# Patient Record
Sex: FEMALE | Race: WHITE | NOT HISPANIC OR LATINO | Employment: FULL TIME | URBAN - METROPOLITAN AREA
[De-identification: names, ages, dates, MRNs, and addresses within clinical notes are randomized per-mention and may not be internally consistent; named-entity substitution may affect disease eponyms.]

---

## 2017-12-08 ENCOUNTER — APPOINTMENT (OUTPATIENT)
Dept: PREADMISSION TESTING | Facility: HOSPITAL | Age: 52
End: 2017-12-08

## 2017-12-11 ENCOUNTER — APPOINTMENT (OUTPATIENT)
Dept: PREADMISSION TESTING | Facility: HOSPITAL | Age: 52
End: 2017-12-11

## 2017-12-11 VITALS
TEMPERATURE: 98.6 F | SYSTOLIC BLOOD PRESSURE: 101 MMHG | BODY MASS INDEX: 42.68 KG/M2 | RESPIRATION RATE: 16 BRPM | HEART RATE: 76 BPM | OXYGEN SATURATION: 97 % | HEIGHT: 64 IN | DIASTOLIC BLOOD PRESSURE: 67 MMHG | WEIGHT: 250 LBS

## 2017-12-11 LAB
ANION GAP SERPL CALCULATED.3IONS-SCNC: 15.3 MMOL/L
BASOPHILS # BLD AUTO: 0.04 10*3/MM3 (ref 0–0.2)
BASOPHILS NFR BLD AUTO: 0.9 % (ref 0–1.5)
BILIRUB UR QL STRIP: NEGATIVE
BUN BLD-MCNC: 11 MG/DL (ref 6–20)
BUN/CREAT SERPL: 17.7 (ref 7–25)
CALCIUM SPEC-SCNC: 9.2 MG/DL (ref 8.6–10.5)
CHLORIDE SERPL-SCNC: 102 MMOL/L (ref 98–107)
CLARITY UR: CLEAR
CO2 SERPL-SCNC: 24.7 MMOL/L (ref 22–29)
COLOR UR: YELLOW
CREAT BLD-MCNC: 0.62 MG/DL (ref 0.57–1)
DEPRECATED RDW RBC AUTO: 45.6 FL (ref 37–54)
EOSINOPHIL # BLD AUTO: 0.11 10*3/MM3 (ref 0–0.7)
EOSINOPHIL NFR BLD AUTO: 2.4 % (ref 0.3–6.2)
ERYTHROCYTE [DISTWIDTH] IN BLOOD BY AUTOMATED COUNT: 13.4 % (ref 11.7–13)
GFR SERPL CREATININE-BSD FRML MDRD: 101 ML/MIN/1.73
GLUCOSE BLD-MCNC: 100 MG/DL (ref 65–99)
GLUCOSE UR STRIP-MCNC: NEGATIVE MG/DL
HCG SERPL QL: NEGATIVE
HCT VFR BLD AUTO: 40 % (ref 35.6–45.5)
HGB BLD-MCNC: 12.9 G/DL (ref 11.9–15.5)
HGB UR QL STRIP.AUTO: NEGATIVE
IMM GRANULOCYTES # BLD: 0 10*3/MM3 (ref 0–0.03)
IMM GRANULOCYTES NFR BLD: 0 % (ref 0–0.5)
KETONES UR QL STRIP: ABNORMAL
LEUKOCYTE ESTERASE UR QL STRIP.AUTO: NEGATIVE
LYMPHOCYTES # BLD AUTO: 1.2 10*3/MM3 (ref 0.9–4.8)
LYMPHOCYTES NFR BLD AUTO: 26.7 % (ref 19.6–45.3)
MCH RBC QN AUTO: 30 PG (ref 26.9–32)
MCHC RBC AUTO-ENTMCNC: 32.3 G/DL (ref 32.4–36.3)
MCV RBC AUTO: 93 FL (ref 80.5–98.2)
MONOCYTES # BLD AUTO: 0.33 10*3/MM3 (ref 0.2–1.2)
MONOCYTES NFR BLD AUTO: 7.3 % (ref 5–12)
NEUTROPHILS # BLD AUTO: 2.81 10*3/MM3 (ref 1.9–8.1)
NEUTROPHILS NFR BLD AUTO: 62.7 % (ref 42.7–76)
NITRITE UR QL STRIP: NEGATIVE
PH UR STRIP.AUTO: 8.5 [PH] (ref 5–8)
PLATELET # BLD AUTO: 221 10*3/MM3 (ref 140–500)
PMV BLD AUTO: 11.9 FL (ref 6–12)
POTASSIUM BLD-SCNC: 3.5 MMOL/L (ref 3.5–5.2)
PROT UR QL STRIP: ABNORMAL
RBC # BLD AUTO: 4.3 10*6/MM3 (ref 3.9–5.2)
SODIUM BLD-SCNC: 142 MMOL/L (ref 136–145)
SP GR UR STRIP: 1.02 (ref 1–1.03)
UROBILINOGEN UR QL STRIP: ABNORMAL
WBC NRBC COR # BLD: 4.49 10*3/MM3 (ref 4.5–10.7)

## 2017-12-11 PROCEDURE — 93010 ELECTROCARDIOGRAM REPORT: CPT | Performed by: INTERNAL MEDICINE

## 2017-12-11 PROCEDURE — 81003 URINALYSIS AUTO W/O SCOPE: CPT | Performed by: ORTHOPAEDIC SURGERY

## 2017-12-11 PROCEDURE — 85025 COMPLETE CBC W/AUTO DIFF WBC: CPT | Performed by: ORTHOPAEDIC SURGERY

## 2017-12-11 PROCEDURE — 93005 ELECTROCARDIOGRAM TRACING: CPT | Performed by: ORTHOPAEDIC SURGERY

## 2017-12-11 PROCEDURE — 84703 CHORIONIC GONADOTROPIN ASSAY: CPT | Performed by: ORTHOPAEDIC SURGERY

## 2017-12-11 PROCEDURE — 36415 COLL VENOUS BLD VENIPUNCTURE: CPT

## 2017-12-11 PROCEDURE — 80048 BASIC METABOLIC PNL TOTAL CA: CPT | Performed by: ORTHOPAEDIC SURGERY

## 2017-12-11 RX ORDER — VITAMIN E 268 MG
400 CAPSULE ORAL EVERY MORNING
COMMUNITY
End: 2020-10-14

## 2017-12-11 RX ORDER — CELECOXIB 200 MG/1
200 CAPSULE ORAL DAILY
COMMUNITY
End: 2017-12-16 | Stop reason: HOSPADM

## 2017-12-11 RX ORDER — AMLODIPINE AND VALSARTAN 10; 320 MG/1; MG/1
1 TABLET ORAL EVERY MORNING
COMMUNITY
End: 2017-12-16 | Stop reason: HOSPADM

## 2017-12-11 RX ORDER — HYDROCHLOROTHIAZIDE 12.5 MG/1
12.5 TABLET ORAL DAILY
COMMUNITY
End: 2017-12-16 | Stop reason: HOSPADM

## 2017-12-11 RX ORDER — MAGNESIUM 200 MG
1 TABLET ORAL EVERY MORNING
COMMUNITY
End: 2020-10-14

## 2017-12-11 RX ORDER — ASPIRIN 81 MG/1
81 TABLET ORAL EVERY MORNING
COMMUNITY
End: 2017-12-16 | Stop reason: HOSPADM

## 2017-12-11 RX ORDER — FLUOXETINE HYDROCHLORIDE 20 MG/1
20 CAPSULE ORAL EVERY MORNING
COMMUNITY

## 2017-12-11 RX ORDER — ESOMEPRAZOLE MAGNESIUM 40 MG/1
40 CAPSULE, DELAYED RELEASE ORAL
COMMUNITY

## 2017-12-11 RX ORDER — OXYCODONE AND ACETAMINOPHEN 10; 325 MG/1; MG/1
1 TABLET ORAL EVERY 6 HOURS PRN
COMMUNITY
End: 2017-12-16 | Stop reason: HOSPADM

## 2017-12-11 RX ORDER — FLUTICASONE PROPIONATE 50 MCG
2 SPRAY, SUSPENSION (ML) NASAL EVERY MORNING
COMMUNITY

## 2017-12-11 RX ORDER — DULOXETIN HYDROCHLORIDE 60 MG/1
120 CAPSULE, DELAYED RELEASE ORAL EVERY MORNING
COMMUNITY

## 2017-12-11 ASSESSMENT — KOOS JR
KOOS JR SCORE: 34.174
KOOS JR SCORE: 21

## 2017-12-11 NOTE — DISCHARGE INSTRUCTIONS
Take the following medications the morning of surgery with a small sip of water:    AMLODIPINE  AND NEXIUM    General Instructions:  • Do not eat solid food after midnight the night before surgery.  • You may drink clear liquids day of surgery but must stop at least one hour before your hospital arrival time.   ( 1030 AM )  • It is beneficial for you to have a clear drink that contains carbohydrates the day of surgery.  We suggest a 12 to 20 ounce bottle of Gatorade or Powerade for non-diabetic patients     Clear liquids are liquids you can see through.  Nothing red in color.     Plain water                               Sports drinks  Sodas                                   Gelatin (Jell-O)  Fruit juices without pulp such as white grape juice and apple juice  Popsicles that contain no fruit or yogurt  Tea or coffee (no cream or milk added)  Gatorade / Powerade  G2 / Powerade Zero    • Patients who avoid smoking, chewing tobacco and alcohol for 4 weeks prior to surgery have a reduced risk of post-operative complications.  Quit smoking as many days before surgery as you can.  • Do not smoke, use chewing tobacco or drink alcohol the day of surgery.   • If applicable bring your C-PAP/ BI-PAP machine.  • Bring any papers given to you in the doctor’s office.  • Wear clean comfortable clothes and socks.  • Do not wear contact lenses or make-up.  Bring a case for your glasses.   • Remove all piercings.  Leave jewelry and any other valuables at home.  • The Pre-Admission Testing nurse will instruct you to bring medications if unable to obtain an accurate list in Pre-Admission Testing.    • REPORT TO MAIN SURGERY ON 12- AT 1130 AM        Preventing a Surgical Site Infection:  • For 2 to 3 days before surgery, avoid shaving with a razor because the razor can irritate skin and make it easier to develop an infection.  • The night prior to surgery sleep in a clean bed with clean clothing.  Do not allow pets to sleep with  you.  • Shower on the morning of surgery using a fresh bar of anti-bacterial soap (such as Dial) and clean washcloth.  Dry with a clean towel and dress in clean clothing.  • Ask your surgeon if you will be receiving antibiotics prior to surgery.  • Make sure you, your family, and all healthcare providers clean their hands with soap and water or an alcohol based hand  before caring for you or your wound.    Day of surgery:  Upon arrival, a Pre-op nurse and Anesthesiologist will review your health history, obtain vital signs, and answer questions you may have.  The only belongings needed at this time will be your home medications and if applicable your C-PAP/BI-PAP machine.  If you are staying overnight your family can leave the rest of your belongings in the car and bring them to your room later.  A Pre-op nurse will start an IV and you may receive medication in preparation for surgery, including something to help you relax.  Your family will be able to see you in the Pre-op area.  While you are in surgery your family should notify the waiting room  if they leave the waiting room area and provide a contact phone number.    Please be aware that surgery does come with discomfort.  We want to make every effort to control your discomfort so please discuss any uncontrolled symptoms with your nurse.   Your doctor will most likely have prescribed pain medications.      If you are staying overnight following surgery, you will be transported to your hospital room following the recovery period.  [facility] has all private rooms.    If you have any questions please call Pre-Admission Testing at [phone].    Deductibles and co-payments are collected on the day of service. Please be prepared to pay the required co-pay, deductible or deposit on the day of service as defined by your plan.

## 2017-12-12 ENCOUNTER — APPOINTMENT (OUTPATIENT)
Dept: GENERAL RADIOLOGY | Facility: HOSPITAL | Age: 52
End: 2017-12-12
Attending: ORTHOPAEDIC SURGERY

## 2017-12-12 ENCOUNTER — ANESTHESIA (OUTPATIENT)
Dept: PERIOP | Facility: HOSPITAL | Age: 52
End: 2017-12-12

## 2017-12-12 ENCOUNTER — HOSPITAL ENCOUNTER (INPATIENT)
Facility: HOSPITAL | Age: 52
LOS: 4 days | Discharge: HOME HEALTH | End: 2017-12-16
Attending: ORTHOPAEDIC SURGERY | Admitting: ORTHOPAEDIC SURGERY

## 2017-12-12 ENCOUNTER — ANESTHESIA EVENT (OUTPATIENT)
Dept: PERIOP | Facility: HOSPITAL | Age: 52
End: 2017-12-12

## 2017-12-12 DIAGNOSIS — R26.89 IMPAIRED GAIT AND MOBILITY: ICD-10-CM

## 2017-12-12 DIAGNOSIS — T84.84XA PAIN DUE TO INTERNAL ORTHOPEDIC PROSTHETIC DEVICES, IMPLANTS AND GRAFTS, INITIAL ENCOUNTER (HCC): Primary | ICD-10-CM

## 2017-12-12 PROCEDURE — 0SRD0J9 REPLACEMENT OF LEFT KNEE JOINT WITH SYNTHETIC SUBSTITUTE, CEMENTED, OPEN APPROACH: ICD-10-PCS | Performed by: ORTHOPAEDIC SURGERY

## 2017-12-12 PROCEDURE — 25010000002 FENTANYL CITRATE (PF) 100 MCG/2ML SOLUTION: Performed by: NURSE ANESTHETIST, CERTIFIED REGISTERED

## 2017-12-12 PROCEDURE — 73560 X-RAY EXAM OF KNEE 1 OR 2: CPT

## 2017-12-12 PROCEDURE — C1713 ANCHOR/SCREW BN/BN,TIS/BN: HCPCS | Performed by: ORTHOPAEDIC SURGERY

## 2017-12-12 PROCEDURE — L1830 KO IMMOB CANVAS LONG PRE OTS: HCPCS | Performed by: ORTHOPAEDIC SURGERY

## 2017-12-12 PROCEDURE — C1776 JOINT DEVICE (IMPLANTABLE): HCPCS | Performed by: ORTHOPAEDIC SURGERY

## 2017-12-12 PROCEDURE — 25010000002 PROPOFOL 10 MG/ML EMULSION: Performed by: NURSE ANESTHETIST, CERTIFIED REGISTERED

## 2017-12-12 PROCEDURE — 25010000003 CEFAZOLIN IN DEXTROSE 2-4 GM/100ML-% SOLUTION: Performed by: ORTHOPAEDIC SURGERY

## 2017-12-12 PROCEDURE — 25010000002 FENTANYL CITRATE (PF) 100 MCG/2ML SOLUTION: Performed by: ANESTHESIOLOGY

## 2017-12-12 PROCEDURE — 25010000002 HYDROMORPHONE PER 4 MG: Performed by: NURSE ANESTHETIST, CERTIFIED REGISTERED

## 2017-12-12 PROCEDURE — 25010000002 MIDAZOLAM PER 1 MG: Performed by: ANESTHESIOLOGY

## 2017-12-12 PROCEDURE — 25010000002 KETOROLAC TROMETHAMINE PER 15 MG

## 2017-12-12 PROCEDURE — 0SPD0JZ REMOVAL OF SYNTHETIC SUBSTITUTE FROM LEFT KNEE JOINT, OPEN APPROACH: ICD-10-PCS | Performed by: ORTHOPAEDIC SURGERY

## 2017-12-12 PROCEDURE — 25010000002 ONDANSETRON PER 1 MG: Performed by: ANESTHESIOLOGY

## 2017-12-12 DEVICE — IMPLANTABLE DEVICE: Type: IMPLANTABLE DEVICE | Site: KNEE | Status: FUNCTIONAL

## 2017-12-12 DEVICE — STEM TIB PRI FINN 46X40MM: Type: IMPLANTABLE DEVICE | Site: KNEE | Status: FUNCTIONAL

## 2017-12-12 DEVICE — PAT 3PEG STD 8X31MM: Type: IMPLANTABLE DEVICE | Site: KNEE | Status: FUNCTIONAL

## 2017-12-12 DEVICE — BEAR TIB/KN VANGUARD CR DCM 16X71/75MM NS: Type: IMPLANTABLE DEVICE | Site: KNEE | Status: FUNCTIONAL

## 2017-12-12 RX ORDER — TRANEXAMIC ACID 100 MG/ML
INJECTION, SOLUTION INTRAVENOUS AS NEEDED
Status: DISCONTINUED | OUTPATIENT
Start: 2017-12-12 | End: 2017-12-12 | Stop reason: SURG

## 2017-12-12 RX ORDER — DULOXETIN HYDROCHLORIDE 60 MG/1
120 CAPSULE, DELAYED RELEASE ORAL DAILY
Status: DISCONTINUED | OUTPATIENT
Start: 2017-12-12 | End: 2017-12-16 | Stop reason: HOSPADM

## 2017-12-12 RX ORDER — PROPOFOL 10 MG/ML
VIAL (ML) INTRAVENOUS AS NEEDED
Status: DISCONTINUED | OUTPATIENT
Start: 2017-12-12 | End: 2017-12-12 | Stop reason: SURG

## 2017-12-12 RX ORDER — HYDROMORPHONE HYDROCHLORIDE 1 MG/ML
0.5 INJECTION, SOLUTION INTRAMUSCULAR; INTRAVENOUS; SUBCUTANEOUS
Status: DISCONTINUED | OUTPATIENT
Start: 2017-12-12 | End: 2017-12-12 | Stop reason: HOSPADM

## 2017-12-12 RX ORDER — ALBUTEROL SULFATE 2.5 MG/3ML
2.5 SOLUTION RESPIRATORY (INHALATION) ONCE AS NEEDED
Status: DISCONTINUED | OUTPATIENT
Start: 2017-12-12 | End: 2017-12-12 | Stop reason: HOSPADM

## 2017-12-12 RX ORDER — ONDANSETRON 4 MG/1
4 TABLET, ORALLY DISINTEGRATING ORAL EVERY 6 HOURS PRN
Status: DISCONTINUED | OUTPATIENT
Start: 2017-12-12 | End: 2017-12-16 | Stop reason: HOSPADM

## 2017-12-12 RX ORDER — SODIUM CHLORIDE 0.9 % (FLUSH) 0.9 %
1-10 SYRINGE (ML) INJECTION AS NEEDED
Status: DISCONTINUED | OUTPATIENT
Start: 2017-12-12 | End: 2017-12-12 | Stop reason: HOSPADM

## 2017-12-12 RX ORDER — MIDAZOLAM HYDROCHLORIDE 1 MG/ML
2 INJECTION INTRAMUSCULAR; INTRAVENOUS
Status: DISCONTINUED | OUTPATIENT
Start: 2017-12-12 | End: 2017-12-12 | Stop reason: HOSPADM

## 2017-12-12 RX ORDER — HYDRALAZINE HYDROCHLORIDE 20 MG/ML
5 INJECTION INTRAMUSCULAR; INTRAVENOUS
Status: DISCONTINUED | OUTPATIENT
Start: 2017-12-12 | End: 2017-12-12 | Stop reason: HOSPADM

## 2017-12-12 RX ORDER — KETOROLAC TROMETHAMINE 30 MG/ML
30 INJECTION, SOLUTION INTRAMUSCULAR; INTRAVENOUS EVERY 6 HOURS PRN
Status: DISPENSED | OUTPATIENT
Start: 2017-12-12 | End: 2017-12-14

## 2017-12-12 RX ORDER — SENNA AND DOCUSATE SODIUM 50; 8.6 MG/1; MG/1
2 TABLET, FILM COATED ORAL 2 TIMES DAILY
Status: DISCONTINUED | OUTPATIENT
Start: 2017-12-12 | End: 2017-12-16 | Stop reason: HOSPADM

## 2017-12-12 RX ORDER — HYDROCHLOROTHIAZIDE 25 MG/1
12.5 TABLET ORAL DAILY
Status: DISCONTINUED | OUTPATIENT
Start: 2017-12-12 | End: 2017-12-13

## 2017-12-12 RX ORDER — DIPHENHYDRAMINE HYDROCHLORIDE 50 MG/ML
12.5 INJECTION INTRAMUSCULAR; INTRAVENOUS
Status: DISCONTINUED | OUTPATIENT
Start: 2017-12-12 | End: 2017-12-12 | Stop reason: HOSPADM

## 2017-12-12 RX ORDER — FLUOXETINE HYDROCHLORIDE 20 MG/1
20 CAPSULE ORAL EVERY MORNING
Status: DISCONTINUED | OUTPATIENT
Start: 2017-12-13 | End: 2017-12-16 | Stop reason: HOSPADM

## 2017-12-12 RX ORDER — EPHEDRINE SULFATE 50 MG/ML
INJECTION, SOLUTION INTRAVENOUS AS NEEDED
Status: DISCONTINUED | OUTPATIENT
Start: 2017-12-12 | End: 2017-12-12 | Stop reason: SURG

## 2017-12-12 RX ORDER — ONDANSETRON 4 MG/1
4 TABLET, FILM COATED ORAL EVERY 6 HOURS PRN
Status: DISCONTINUED | OUTPATIENT
Start: 2017-12-12 | End: 2017-12-16 | Stop reason: HOSPADM

## 2017-12-12 RX ORDER — MIDAZOLAM HYDROCHLORIDE 1 MG/ML
1 INJECTION INTRAMUSCULAR; INTRAVENOUS
Status: DISCONTINUED | OUTPATIENT
Start: 2017-12-12 | End: 2017-12-12 | Stop reason: HOSPADM

## 2017-12-12 RX ORDER — FENTANYL CITRATE 50 UG/ML
50 INJECTION, SOLUTION INTRAMUSCULAR; INTRAVENOUS
Status: DISCONTINUED | OUTPATIENT
Start: 2017-12-12 | End: 2017-12-12 | Stop reason: HOSPADM

## 2017-12-12 RX ORDER — ONDANSETRON 2 MG/ML
INJECTION INTRAMUSCULAR; INTRAVENOUS AS NEEDED
Status: DISCONTINUED | OUTPATIENT
Start: 2017-12-12 | End: 2017-12-12 | Stop reason: SURG

## 2017-12-12 RX ORDER — ONDANSETRON 2 MG/ML
4 INJECTION INTRAMUSCULAR; INTRAVENOUS ONCE AS NEEDED
Status: DISCONTINUED | OUTPATIENT
Start: 2017-12-12 | End: 2017-12-12 | Stop reason: HOSPADM

## 2017-12-12 RX ORDER — HYDROMORPHONE HCL 110MG/55ML
PATIENT CONTROLLED ANALGESIA SYRINGE INTRAVENOUS AS NEEDED
Status: DISCONTINUED | OUTPATIENT
Start: 2017-12-12 | End: 2017-12-12 | Stop reason: SURG

## 2017-12-12 RX ORDER — ATENOLOL 50 MG/1
100 TABLET ORAL
Status: DISCONTINUED | OUTPATIENT
Start: 2017-12-12 | End: 2017-12-13

## 2017-12-12 RX ORDER — PANTOPRAZOLE SODIUM 40 MG/1
40 TABLET, DELAYED RELEASE ORAL EVERY MORNING
Status: DISCONTINUED | OUTPATIENT
Start: 2017-12-13 | End: 2017-12-16 | Stop reason: HOSPADM

## 2017-12-12 RX ORDER — NALOXONE HCL 0.4 MG/ML
0.1 VIAL (ML) INJECTION
Status: DISCONTINUED | OUTPATIENT
Start: 2017-12-12 | End: 2017-12-16 | Stop reason: HOSPADM

## 2017-12-12 RX ORDER — SODIUM CHLORIDE, SODIUM LACTATE, POTASSIUM CHLORIDE, CALCIUM CHLORIDE 600; 310; 30; 20 MG/100ML; MG/100ML; MG/100ML; MG/100ML
9 INJECTION, SOLUTION INTRAVENOUS CONTINUOUS
Status: DISCONTINUED | OUTPATIENT
Start: 2017-12-12 | End: 2017-12-12 | Stop reason: HOSPADM

## 2017-12-12 RX ORDER — FLUTICASONE PROPIONATE 50 MCG
2 SPRAY, SUSPENSION (ML) NASAL EVERY MORNING
Status: DISCONTINUED | OUTPATIENT
Start: 2017-12-13 | End: 2017-12-16 | Stop reason: HOSPADM

## 2017-12-12 RX ORDER — ONDANSETRON 2 MG/ML
4 INJECTION INTRAMUSCULAR; INTRAVENOUS EVERY 6 HOURS PRN
Status: DISCONTINUED | OUTPATIENT
Start: 2017-12-12 | End: 2017-12-16 | Stop reason: HOSPADM

## 2017-12-12 RX ORDER — GLYCOPYRROLATE 0.2 MG/ML
INJECTION INTRAMUSCULAR; INTRAVENOUS AS NEEDED
Status: DISCONTINUED | OUTPATIENT
Start: 2017-12-12 | End: 2017-12-12 | Stop reason: SURG

## 2017-12-12 RX ORDER — BUPIVACAINE HYDROCHLORIDE AND EPINEPHRINE 5; 5 MG/ML; UG/ML
INJECTION, SOLUTION EPIDURAL; INTRACAUDAL; PERINEURAL AS NEEDED
Status: DISCONTINUED | OUTPATIENT
Start: 2017-12-12 | End: 2017-12-12 | Stop reason: SURG

## 2017-12-12 RX ORDER — LIDOCAINE HYDROCHLORIDE 20 MG/ML
INJECTION, SOLUTION INFILTRATION; PERINEURAL AS NEEDED
Status: DISCONTINUED | OUTPATIENT
Start: 2017-12-12 | End: 2017-12-12 | Stop reason: SURG

## 2017-12-12 RX ORDER — KETOROLAC TROMETHAMINE 30 MG/ML
INJECTION, SOLUTION INTRAMUSCULAR; INTRAVENOUS
Status: COMPLETED
Start: 2017-12-12 | End: 2017-12-12

## 2017-12-12 RX ORDER — VITAMIN E 268 MG
400 CAPSULE ORAL EVERY MORNING
Status: DISCONTINUED | OUTPATIENT
Start: 2017-12-13 | End: 2017-12-16 | Stop reason: HOSPADM

## 2017-12-12 RX ORDER — LABETALOL HYDROCHLORIDE 5 MG/ML
5 INJECTION, SOLUTION INTRAVENOUS
Status: DISCONTINUED | OUTPATIENT
Start: 2017-12-12 | End: 2017-12-12 | Stop reason: HOSPADM

## 2017-12-12 RX ORDER — CEFAZOLIN SODIUM 2 G/100ML
2 INJECTION, SOLUTION INTRAVENOUS ONCE
Status: COMPLETED | OUTPATIENT
Start: 2017-12-12 | End: 2017-12-12

## 2017-12-12 RX ORDER — OXYCODONE AND ACETAMINOPHEN 10; 325 MG/1; MG/1
1 TABLET ORAL EVERY 4 HOURS PRN
Status: DISCONTINUED | OUTPATIENT
Start: 2017-12-12 | End: 2017-12-16 | Stop reason: HOSPADM

## 2017-12-12 RX ORDER — MAGNESIUM HYDROXIDE 1200 MG/15ML
LIQUID ORAL AS NEEDED
Status: DISCONTINUED | OUTPATIENT
Start: 2017-12-12 | End: 2017-12-12 | Stop reason: HOSPADM

## 2017-12-12 RX ORDER — CHLORTHALIDONE 25 MG/1
25 TABLET ORAL DAILY
Status: DISCONTINUED | OUTPATIENT
Start: 2017-12-12 | End: 2017-12-13

## 2017-12-12 RX ORDER — ATENOLOL AND CHLORTHALIDONE TABLET 100; 25 MG/1; MG/1
1 TABLET ORAL
Status: DISCONTINUED | OUTPATIENT
Start: 2017-12-12 | End: 2017-12-12 | Stop reason: CLARIF

## 2017-12-12 RX ORDER — BUPIVACAINE HYDROCHLORIDE AND EPINEPHRINE 5; 5 MG/ML; UG/ML
INJECTION, SOLUTION PERINEURAL AS NEEDED
Status: DISCONTINUED | OUTPATIENT
Start: 2017-12-12 | End: 2017-12-12 | Stop reason: HOSPADM

## 2017-12-12 RX ORDER — OXYCODONE AND ACETAMINOPHEN 10; 325 MG/1; MG/1
TABLET ORAL
Status: COMPLETED
Start: 2017-12-12 | End: 2017-12-12

## 2017-12-12 RX ORDER — FAMOTIDINE 10 MG/ML
20 INJECTION, SOLUTION INTRAVENOUS ONCE
Status: COMPLETED | OUTPATIENT
Start: 2017-12-12 | End: 2017-12-12

## 2017-12-12 RX ORDER — CEFAZOLIN SODIUM 2 G/100ML
2 INJECTION, SOLUTION INTRAVENOUS EVERY 8 HOURS
Status: COMPLETED | OUTPATIENT
Start: 2017-12-12 | End: 2017-12-13

## 2017-12-12 RX ADMIN — FENTANYL CITRATE 50 MCG: 50 INJECTION INTRAMUSCULAR; INTRAVENOUS at 14:29

## 2017-12-12 RX ADMIN — MEPERIDINE HYDROCHLORIDE 12.5 MG: 25 INJECTION, SOLUTION INTRAMUSCULAR; INTRAVENOUS; SUBCUTANEOUS at 17:15

## 2017-12-12 RX ADMIN — PROPOFOL 20 MG: 10 INJECTION, EMULSION INTRAVENOUS at 14:06

## 2017-12-12 RX ADMIN — PROPOFOL 30 MG: 10 INJECTION, EMULSION INTRAVENOUS at 14:03

## 2017-12-12 RX ADMIN — DULOXETINE HYDROCHLORIDE 120 MG: 60 CAPSULE, DELAYED RELEASE ORAL at 20:57

## 2017-12-12 RX ADMIN — MUPIROCIN 1 APPLICATION: 20 OINTMENT TOPICAL at 13:45

## 2017-12-12 RX ADMIN — PROPOFOL 300 MG: 10 INJECTION, EMULSION INTRAVENOUS at 13:58

## 2017-12-12 RX ADMIN — KETOROLAC TROMETHAMINE 30 MG: 30 INJECTION, SOLUTION INTRAMUSCULAR at 17:25

## 2017-12-12 RX ADMIN — HYDROMORPHONE HYDROCHLORIDE 0.5 MG: 1 INJECTION, SOLUTION INTRAMUSCULAR; INTRAVENOUS; SUBCUTANEOUS at 17:42

## 2017-12-12 RX ADMIN — CEFAZOLIN SODIUM 2 G: 2 INJECTION, SOLUTION INTRAVENOUS at 13:58

## 2017-12-12 RX ADMIN — PROPOFOL 50 MG: 10 INJECTION, EMULSION INTRAVENOUS at 13:59

## 2017-12-12 RX ADMIN — FENTANYL CITRATE 50 MCG: 50 INJECTION INTRAMUSCULAR; INTRAVENOUS at 12:47

## 2017-12-12 RX ADMIN — BUPIVACAINE HYDROCHLORIDE AND EPINEPHRINE BITARTRATE 25 ML: 5; .0091 INJECTION, SOLUTION EPIDURAL; INTRACAUDAL; PERINEURAL at 12:34

## 2017-12-12 RX ADMIN — FAMOTIDINE 20 MG: 10 INJECTION, SOLUTION INTRAVENOUS at 12:20

## 2017-12-12 RX ADMIN — FENTANYL CITRATE 50 MCG: 50 INJECTION INTRAMUSCULAR; INTRAVENOUS at 14:07

## 2017-12-12 RX ADMIN — OXYCODONE HYDROCHLORIDE AND ACETAMINOPHEN 1 TABLET: 10; 325 TABLET ORAL at 17:50

## 2017-12-12 RX ADMIN — FENTANYL CITRATE 50 MCG: 50 INJECTION INTRAMUSCULAR; INTRAVENOUS at 13:56

## 2017-12-12 RX ADMIN — EPHEDRINE SULFATE 5 MG: 50 INJECTION INTRAMUSCULAR; INTRAVENOUS; SUBCUTANEOUS at 14:44

## 2017-12-12 RX ADMIN — SODIUM CHLORIDE, POTASSIUM CHLORIDE, SODIUM LACTATE AND CALCIUM CHLORIDE: 600; 310; 30; 20 INJECTION, SOLUTION INTRAVENOUS at 14:55

## 2017-12-12 RX ADMIN — FENTANYL CITRATE 50 MCG: 50 INJECTION INTRAMUSCULAR; INTRAVENOUS at 17:26

## 2017-12-12 RX ADMIN — CEFAZOLIN SODIUM 2 G: 2 INJECTION, SOLUTION INTRAVENOUS at 21:08

## 2017-12-12 RX ADMIN — DOCUSATE SODIUM -SENNOSIDES 2 TABLET: 50; 8.6 TABLET, COATED ORAL at 20:57

## 2017-12-12 RX ADMIN — FENTANYL CITRATE 50 MCG: 50 INJECTION INTRAMUSCULAR; INTRAVENOUS at 12:39

## 2017-12-12 RX ADMIN — HYDROMORPHONE HYDROCHLORIDE 0.5 MG: 1 INJECTION, SOLUTION INTRAMUSCULAR; INTRAVENOUS; SUBCUTANEOUS at 17:12

## 2017-12-12 RX ADMIN — LIDOCAINE HYDROCHLORIDE 40 MG: 20 INJECTION, SOLUTION INFILTRATION; PERINEURAL at 13:58

## 2017-12-12 RX ADMIN — HYDROCHLOROTHIAZIDE 12.5 MG: 25 TABLET ORAL at 20:56

## 2017-12-12 RX ADMIN — Medication 2 MG: at 12:39

## 2017-12-12 RX ADMIN — FENTANYL CITRATE 50 MCG: 50 INJECTION INTRAMUSCULAR; INTRAVENOUS at 17:10

## 2017-12-12 RX ADMIN — HYDROMORPHONE HYDROCHLORIDE 0.5 MG: 2 INJECTION INTRAMUSCULAR; INTRAVENOUS; SUBCUTANEOUS at 14:54

## 2017-12-12 RX ADMIN — TRANEXAMIC ACID 1000 MG: 100 INJECTION, SOLUTION INTRAVENOUS at 16:20

## 2017-12-12 RX ADMIN — EPHEDRINE SULFATE 10 MG: 50 INJECTION INTRAMUSCULAR; INTRAVENOUS; SUBCUTANEOUS at 14:39

## 2017-12-12 RX ADMIN — SODIUM CHLORIDE, POTASSIUM CHLORIDE, SODIUM LACTATE AND CALCIUM CHLORIDE 9 ML/HR: 600; 310; 30; 20 INJECTION, SOLUTION INTRAVENOUS at 12:22

## 2017-12-12 RX ADMIN — HYDROMORPHONE HYDROCHLORIDE 1 MG: 2 INJECTION INTRAMUSCULAR; INTRAVENOUS; SUBCUTANEOUS at 16:10

## 2017-12-12 RX ADMIN — FENTANYL CITRATE 50 MCG: 50 INJECTION INTRAMUSCULAR; INTRAVENOUS at 14:24

## 2017-12-12 RX ADMIN — HYDROMORPHONE HYDROCHLORIDE 0.5 MG: 2 INJECTION INTRAMUSCULAR; INTRAVENOUS; SUBCUTANEOUS at 14:50

## 2017-12-12 RX ADMIN — HYDROMORPHONE HYDROCHLORIDE 0.5 MG: 1 INJECTION, SOLUTION INTRAMUSCULAR; INTRAVENOUS; SUBCUTANEOUS at 17:58

## 2017-12-12 RX ADMIN — Medication 2 MG: at 12:21

## 2017-12-12 RX ADMIN — GLYCOPYRROLATE 0.2 MG: 0.2 INJECTION INTRAMUSCULAR; INTRAVENOUS at 14:19

## 2017-12-12 RX ADMIN — FENTANYL CITRATE 50 MCG: 50 INJECTION INTRAMUSCULAR; INTRAVENOUS at 13:58

## 2017-12-12 RX ADMIN — ONDANSETRON 4 MG: 2 INJECTION INTRAMUSCULAR; INTRAVENOUS at 16:25

## 2017-12-12 RX ADMIN — OXYCODONE HYDROCHLORIDE AND ACETAMINOPHEN 1 TABLET: 10; 325 TABLET ORAL at 23:24

## 2017-12-12 RX ADMIN — HYDROMORPHONE HYDROCHLORIDE 1 MG: 2 INJECTION INTRAMUSCULAR; INTRAVENOUS; SUBCUTANEOUS at 15:06

## 2017-12-12 RX ADMIN — EPHEDRINE SULFATE 10 MG: 50 INJECTION INTRAMUSCULAR; INTRAVENOUS; SUBCUTANEOUS at 14:19

## 2017-12-12 NOTE — ANESTHESIA POSTPROCEDURE EVALUATION
Patient: Sophie Ace    Procedure Summary     Date Anesthesia Start Anesthesia Stop Room / Location    12/12/17 1354 1705  [facility] /  [facility]       Procedure Diagnosis Surgeon Provider    REVISION LT TOTAL KNEE  (Left Knee) No diagnosis on file. MD Kvng Blankenship MD          Anesthesia Type: general, regional  Last vitals  BP   116/57 (12/12/17 1715)   Temp   36.7 °C (98 °F) (12/12/17 1701)   Pulse   101 (12/12/17 1715)   Resp   18 (12/12/17 1715)     SpO2   100 % (12/12/17 1715)     Post Anesthesia Care and Evaluation    Patient location during evaluation: PACU  Patient participation: complete - patient participated  Level of consciousness: awake and alert  Pain management: adequate  Airway patency: patent  Anesthetic complications: No anesthetic complications    Cardiovascular status: acceptable  Respiratory status: acceptable  Hydration status: acceptable    Comments: --------------------            12/12/17 1715     --------------------   BP:       116/57     Pulse:     101       Resp:       18       Temp:                SpO2:      100%     --------------------

## 2017-12-12 NOTE — ANESTHESIA PROCEDURE NOTES
Peripheral Block    Patient location during procedure: holding area  Start time: 12/12/2017 12:37 PM  Stop time: 12/12/2017 12:44 PM  Reason for block: at surgeon's request and post-op pain management  Performed by  Anesthesiologist: LIBBY BRENNAN  Preanesthetic Checklist  Completed: patient identified, site marked, surgical consent, pre-op evaluation, timeout performed, IV checked, risks and benefits discussed and monitors and equipment checked  Prep:  Pt Position: supine  Sterile barriers:cap, gloves, mask and sterile barriers  Prep: ChloraPrep  Patient monitoring: blood pressure monitoring, continuous pulse oximetry and EKG  Procedure  Sedation:yes  Performed under: MAC  Guidance:ultrasound guided  ULTRASOUND INTERPRETATION. Using ultrasound guidance a gauge needle was placed in close proximity to the femoral nerve, at which point, under ultrasound guidance anesthetic was injected in the area of the nerve and spread of the anesthesia was seen on ultrasound in close proximity thereto.  There were no abnormalities seen on ultrasound; a digital image was taken; and the patient tolerated the procedure with no complications. Images:still images obtained    Laterality:left  Block Type:adductor canal block  Injection Technique:single-shot  Needle Type:echogenic  Needle Gauge:20 G  Resistance on Injection: none  Medications  Local Injected:bupivacaine 0.5% with epinephrine Local Amount Injected:25mL  Post Assessment  Injection Assessment: negative aspiration for heme, no paresthesia on injection and incremental injection  Patient Tolerance:comfortable throughout block  Complications:yes

## 2017-12-12 NOTE — ANESTHESIA PREPROCEDURE EVALUATION
Anesthesia Evaluation     NPO Solid Status: > 8 hours  NPO Liquid Status: > 2 hours     Airway   Mallampati: II  no difficulty expected  Dental - normal exam     Pulmonary     breath sounds clear to auscultation  (+) sleep apnea,   Cardiovascular     ECG reviewed  Rhythm: regular  Rate: normal    (+) hypertension,       Neuro/Psych  GI/Hepatic/Renal/Endo    (+) morbid obesity, GERD,     Musculoskeletal     Abdominal    Substance History      OB/GYN          Other                                        Anesthesia Plan    ASA 3     general and regional     Anesthetic plan and risks discussed with patient.

## 2017-12-12 NOTE — ANESTHESIA PROCEDURE NOTES
Airway  Urgency: elective    Airway not difficult    General Information and Staff    Patient location during procedure: OR  Anesthesiologist: LIBBY BRENNAN  CRNA: JF NAVARRO    Indications and Patient Condition  Indications for airway management: airway protection    Preoxygenated: yes  Mask difficulty assessment: 1 - vent by mask    Final Airway Details  Final airway type: supraglottic airway      Successful airway: unique  Size 4    Number of attempts at approach: 1    Additional Comments  Smooth IV/mask induction and placement of LMA. Atraumatic, lips/teeth/mouth intact, as preop. +ETCO2, bilateral breath sounds and equal.

## 2017-12-12 NOTE — PLAN OF CARE
Problem: Patient Care Overview (Adult)  Goal: Plan of Care Review  Outcome: Ongoing (interventions implemented as appropriate)    12/12/17 1200   Coping/Psychosocial Response Interventions   Plan Of Care Reviewed With patient   Patient Care Overview   Progress no change       Goal: Adult Individualization and Mutuality  Outcome: Ongoing (interventions implemented as appropriate)    12/12/17 1200   Individualization   Patient Specific Preferences none       Goal: Discharge Needs Assessment  Outcome: Ongoing (interventions implemented as appropriate)    12/12/17 1200   Discharge Needs Assessment   Concerns To Be Addressed no discharge needs identified;denies needs/concerns at this time   Equipment Needed After Discharge cane, straight;walker, rolling;commode   Self-Care   Equipment Currently Used at Home none         Problem: Perioperative Period (Adult)  Goal: Signs and Symptoms of Listed Potential Problems Will be Absent or Manageable (Perioperative Period)  Outcome: Ongoing (interventions implemented as appropriate)    12/12/17 1200   Perioperative Period   Problems Assessed (Perioperative Period) pain;infection;physiologic stress response   Problems Present (Perioperative Period) pain;physiologic stress response

## 2017-12-12 NOTE — H&P
ORTHOPAEDIC HISTORY AND PHYSICAL      Patient: Sophie Ace  YOB: 1965    Date of Admission: 2017   Medical Record Number: [id number]    Attending Physician: Bhanu Baxter MD    Consulting Physician: Bhanu Baxter MD    Chief Complaint: painful left knee s/p unicompartmental replacement    History of Present Illness: 52 y.o. female presented to the office with persistent left knee pain since uni replacement. Has had poly revisions x 2; now with progressive lateral compartment arthrosis.    Patient denies any history of: DVT/PE, MRSA, COPD, EVAN, CHF, CAD, or A-Fib. Patient is not taking anticoagulants.     Past Medical History:   Diagnosis Date   • Anxiety and depression    • Arthritis    • Gastric reflux    • Hypertension    • IBS (irritable bowel syndrome)    • Limited joint range of motion     LEFT KNEE   • Limited range of motion (ROM) of shoulder     RIGHT   • Rotator cuff tear     RIGHT   • Seasonal allergies    • Sleep apnea     USES C-PAP   • Vitamin B 12 deficiency    • Vitamin D deficiency      Past Surgical History:   Procedure Laterality Date   •  SECTION  1987   1992    X3   • COLONOSCOPY  2015   • D&C HYSTEROSCOPY ENDOMETRIAL ABLATION  2014   • ESOPHAGEAL DILATATION      X 5   • HIATAL HERNIA REPAIR  2012    WITH LAP BAND   • INGUINAL HERNIA REPAIR Left    • KNEE MINI REVISION Left 2017   • LAPAROSCOPIC CHOLECYSTECTOMY     • LAPAROSCOPIC GASTRIC BANDING     • PARTIAL KNEE ARTHROPLASTY Left    • TUBAL ABDOMINAL LIGATION     • UMBILICAL HERNIA REPAIR     • VENTRAL HERNIA REPAIR       Social History     Occupational History   • Not on file.     Social History Main Topics   • Smoking status: Never Smoker   • Smokeless tobacco: Never Used   • Alcohol use No   • Drug use: Yes     Special: Oxycodone   • Sexual activity: Defer    Social History     Social History Narrative     Family History   Problem Relation Age of Onset   • Meghan  Hyperthermia Neg Hx        Allergies:   Allergies   Allergen Reactions   • Erythromycin Swelling     Throat swelling   • Morphine Itching and Irritability     ALSO ANXIETY, ITCHING, INSOMNIA   • Penicillins Swelling     Throat.STATES CAN TAKE KEFLEX   • Sulfa Antibiotics Anaphylaxis   • Tetracyclines & Related Swelling     throat       Medications:   Home Medications:  Prescriptions Prior to Admission   Medication Sig Dispense Refill Last Dose   • amLODIPine-valsartan (EXFORGE)  MG per tablet Take 1 tablet by mouth Every Morning.   12/12/2017 at 0930   • Calcium Carbonate (CALCIUM 600 PO) Take 1 tablet by mouth 2 (Two) Times a Day.   12/11/2017 at 1200   • celecoxib (CeleBREX) 200 MG capsule Take 200 mg by mouth Daily.   12/11/2017 at 1200   • Cholecalciferol (VITAMIN D PO) Take 1,000 mg by mouth Every Morning.   12/11/2017 at 1200   • Cyanocobalamin (VITAMIN B-12) 1000 MCG sublingual tablet Place 1 tablet under the tongue Every Morning.   12/11/2017 at 1200   • DULoxetine (CYMBALTA) 60 MG capsule Take 120 mg by mouth Daily. TAKES 2 60 MG TAB   12/11/2017 at 1200   • esomeprazole (nexIUM) 40 MG capsule Take 40 mg by mouth Every Morning Before Breakfast.   12/12/2017 at 0930   • FLUoxetine (PROZAC) 20 MG capsule Take 20 mg by mouth Every Morning.   12/11/2017 at 1200   • fluticasone (FLONASE) 50 MCG/ACT nasal spray 2 sprays by Each Nare route Every Morning.   12/11/2017 at 1200   • Glucosamine-Chondroit-Vit C-Mn (GLUCOSAMINE 1500 COMPLEX PO) Take 3,000 mg by mouth Every Morning.   12/11/2017 at 1200   • hydrochlorothiazide (HYDRODIURIL) 12.5 MG tablet Take 12.5 mg by mouth Daily.   12/11/2017 at 1200   • Multiple Vitamins-Minerals (MULTIVITAMIN ADULTS PO) Take 1 tablet by mouth Every Morning.   12/11/2017 at 1200n   • oxyCODONE-acetaminophen (PERCOCET)  MG per tablet Take 1 tablet by mouth Every 6 (Six) Hours As Needed for Moderate Pain .   12/11/2017 at 1800   • vitamin E 400 UNIT capsule Take 400 Units  by mouth Every Morning.   12/11/2017 at 1200   • aspirin 81 MG EC tablet Take 81 mg by mouth Every Morning.   over 2 week       Current Medications:  Scheduled Meds:  ceFAZolin 2 g Intravenous Once   mupirocin  Each Nare BID     Continuous Infusions:  lactated ringers 9 mL/hr Last Rate: 9 mL/hr (12/12/17 1222)     PRN Meds:.fentanyl  •  midazolam **OR** midazolam  •  sodium chloride    Review of Systems:   A 12 point system review was reviewed with the patient and from the chart  and is negative except for arthralgias    Physical Exam: 52 y.o. female   Vitals:    12/12/17 1242 12/12/17 1244 12/12/17 1246 12/12/17 1249   BP:   105/61    BP Location:   Left arm    Patient Position:   Lying    Pulse: 76 81 81 84   Resp: 13 13 13 13   Temp:       TempSrc:       SpO2: 100% 100% 100% 100%   Weight:       Height:            Gait: Not tested  Mental/HEENT/cardio/skin: The patient's general appearance was well-nourished, well-hydrated, no acute distress.  Orientation was alert and oriented ×3.  The patient's mood was normal.   Pulmonary exam shows normal late exchange, no labored breathing, or shortness of breath.    The skin exam showed normal temperature and color in the area of examination.  Extremities: no C,C,E left knee with healed incision, valgus, painful ROM  Pulses:  Pulses palpable and equal bilaterally    Diagnostic Tests:    Admission on 12/12/2017   Component Date Value Ref Range Status   • Glucose 12/11/2017 100* 65 - 99 mg/dL Final   • BUN 12/11/2017 11  6 - 20 mg/dL Final   • Creatinine 12/11/2017 0.62  0.57 - 1.00 mg/dL Final   • Sodium 12/11/2017 142  136 - 145 mmol/L Final   • Potassium 12/11/2017 3.5  3.5 - 5.2 mmol/L Final   • Chloride 12/11/2017 102  98 - 107 mmol/L Final   • CO2 12/11/2017 24.7  22.0 - 29.0 mmol/L Final   • Calcium 12/11/2017 9.2  8.6 - 10.5 mg/dL Final   • eGFR Non African Amer 12/11/2017 101  >60 mL/min/1.73 Final   • BUN/Creatinine Ratio 12/11/2017 17.7  7.0 - 25.0 Final   • Anion  Gap 12/11/2017 15.3  mmol/L Final   • Color, UA 12/11/2017 Yellow  Yellow, Straw Final   • Appearance, UA 12/11/2017 Clear  Clear Final   • pH, UA 12/11/2017 8.5* 5.0 - 8.0 Final   • Specific Gravity, UA 12/11/2017 1.018  1.005 - 1.030 Final   • Glucose, UA 12/11/2017 Negative  Negative Final   • Ketones, UA 12/11/2017 Trace* Negative Final   • Bilirubin, UA 12/11/2017 Negative  Negative Final   • Blood, UA 12/11/2017 Negative  Negative Final   • Protein, UA 12/11/2017 Trace* Negative Final   • Leuk Esterase, UA 12/11/2017 Negative  Negative Final   • Nitrite, UA 12/11/2017 Negative  Negative Final   • Urobilinogen, UA 12/11/2017 1.0 E.U./dL  0.2 - 1.0 E.U./dL Final   • WBC 12/11/2017 4.49* 4.50 - 10.70 10*3/mm3 Final   • RBC 12/11/2017 4.30  3.90 - 5.20 10*6/mm3 Final   • Hemoglobin 12/11/2017 12.9  11.9 - 15.5 g/dL Final   • Hematocrit 12/11/2017 40.0  35.6 - 45.5 % Final   • MCV 12/11/2017 93.0  80.5 - 98.2 fL Final   • MCH 12/11/2017 30.0  26.9 - 32.0 pg Final   • MCHC 12/11/2017 32.3* 32.4 - 36.3 g/dL Final   • RDW 12/11/2017 13.4* 11.7 - 13.0 % Final   • RDW-SD 12/11/2017 45.6  37.0 - 54.0 fl Final   • MPV 12/11/2017 11.9  6.0 - 12.0 fL Final   • Platelets 12/11/2017 221  140 - 500 10*3/mm3 Final   • Neutrophil % 12/11/2017 62.7  42.7 - 76.0 % Final   • Lymphocyte % 12/11/2017 26.7  19.6 - 45.3 % Final   • Monocyte % 12/11/2017 7.3  5.0 - 12.0 % Final   • Eosinophil % 12/11/2017 2.4  0.3 - 6.2 % Final   • Basophil % 12/11/2017 0.9  0.0 - 1.5 % Final   • Immature Grans % 12/11/2017 0.0  0.0 - 0.5 % Final   • Neutrophils, Absolute 12/11/2017 2.81  1.90 - 8.10 10*3/mm3 Final   • Lymphocytes, Absolute 12/11/2017 1.20  0.90 - 4.80 10*3/mm3 Final   • Monocytes, Absolute 12/11/2017 0.33  0.20 - 1.20 10*3/mm3 Final   • Eosinophils, Absolute 12/11/2017 0.11  0.00 - 0.70 10*3/mm3 Final   • Basophils, Absolute 12/11/2017 0.04  0.00 - 0.20 10*3/mm3 Final   • Immature Grans, Absolute 12/11/2017 0.00  0.00 - 0.03 10*3/mm3  Final   Appointment on 12/11/2017   Component Date Value Ref Range Status   • HCG Qualitative 12/11/2017 Negative  Indeterminate, Negative Final     No results found for: CRYSTAL]  No results found for: CULTURE]  No results found for: URICACID]  No results found.      Assessment:  Patient Active Problem List   Diagnosis   • Pain due to internal orthopedic prosthetic devices, implants and grafts, initial encounter       Plan:  The patient voiced understanding of the risks, benefits, and alternative forms of treatment vs. TKA revision arthroplasty:  We discussed the risks including but not necessarily limited to infection, blood clots, damage to nerves and vessels, persistent pain, failure of implants, wound healing problems, and the possible need for further surgery. Although these risks are very small, they can not be completely eliminated. We will do all that we can to mitigate those risks including the use of antibiotics and blood thinners if possible. She understands and agrees to proceed with the procedure.    Date: 12/12/2017    Bhanu Baxter MD    CC: No Known Provider; MD Bhanu Muñoz MD

## 2017-12-12 NOTE — PLAN OF CARE
Problem: Patient Care Overview (Adult)  Goal: Plan of Care Review  Outcome: Ongoing (interventions implemented as appropriate)    12/12/17 5518   Coping/Psychosocial Response Interventions   Plan Of Care Reviewed With patient   Patient Care Overview   Progress improving   Outcome Evaluation   Outcome Summary/Follow up Plan GOOD PAIN CONTROL AT THIS TIME, JUST ARRIVED FROM PACU, HAS NOT BEEN OUT OF BED, VSS, EDUCATED ON BP MEDS AND MONITORING       Goal: Adult Individualization and Mutuality  Outcome: Ongoing (interventions implemented as appropriate)    Problem: Perioperative Period (Adult)  Goal: Signs and Symptoms of Listed Potential Problems Will be Absent or Manageable (Perioperative Period)  Outcome: Ongoing (interventions implemented as appropriate)    Problem: Knee Replacement, Total (Adult)  Goal: Signs and Symptoms of Listed Potential Problems Will be Absent or Manageable (Knee Replacement, Total)  Outcome: Ongoing (interventions implemented as appropriate)    Problem: Fall Risk (Adult)  Goal: Identify Related Risk Factors and Signs and Symptoms  Outcome: Outcome(s) achieved Date Met:  12/12/17  Goal: Absence of Falls  Outcome: Ongoing (interventions implemented as appropriate)

## 2017-12-12 NOTE — NURSING NOTE
PATIENT  STATES SHE TAKES KEFLEX WITHOUT ANY PROBLEMS OR REACTIONS.  DR. GUARDADO AT BEDSIDE. ORDERS REC'D.

## 2017-12-13 LAB
BASOPHILS # BLD AUTO: 0.02 10*3/MM3 (ref 0–0.2)
BASOPHILS NFR BLD AUTO: 0.3 % (ref 0–1.5)
DEPRECATED RDW RBC AUTO: 46.8 FL (ref 37–54)
EOSINOPHIL # BLD AUTO: 0.14 10*3/MM3 (ref 0–0.7)
EOSINOPHIL NFR BLD AUTO: 1.8 % (ref 0.3–6.2)
ERYTHROCYTE [DISTWIDTH] IN BLOOD BY AUTOMATED COUNT: 13.6 % (ref 11.7–13)
HCT VFR BLD AUTO: 32.8 % (ref 35.6–45.5)
HGB BLD-MCNC: 10.3 G/DL (ref 11.9–15.5)
IMM GRANULOCYTES # BLD: 0.03 10*3/MM3 (ref 0–0.03)
IMM GRANULOCYTES NFR BLD: 0.4 % (ref 0–0.5)
LYMPHOCYTES # BLD AUTO: 0.87 10*3/MM3 (ref 0.9–4.8)
LYMPHOCYTES NFR BLD AUTO: 11.4 % (ref 19.6–45.3)
MCH RBC QN AUTO: 29.8 PG (ref 26.9–32)
MCHC RBC AUTO-ENTMCNC: 31.4 G/DL (ref 32.4–36.3)
MCV RBC AUTO: 94.8 FL (ref 80.5–98.2)
MONOCYTES # BLD AUTO: 0.75 10*3/MM3 (ref 0.2–1.2)
MONOCYTES NFR BLD AUTO: 9.9 % (ref 5–12)
NEUTROPHILS # BLD AUTO: 5.8 10*3/MM3 (ref 1.9–8.1)
NEUTROPHILS NFR BLD AUTO: 76.2 % (ref 42.7–76)
PLATELET # BLD AUTO: 152 10*3/MM3 (ref 140–500)
PMV BLD AUTO: 11.6 FL (ref 6–12)
RBC # BLD AUTO: 3.46 10*6/MM3 (ref 3.9–5.2)
WBC NRBC COR # BLD: 7.61 10*3/MM3 (ref 4.5–10.7)

## 2017-12-13 PROCEDURE — 25010000002 KETOROLAC TROMETHAMINE PER 15 MG: Performed by: ORTHOPAEDIC SURGERY

## 2017-12-13 PROCEDURE — 97150 GROUP THERAPEUTIC PROCEDURES: CPT

## 2017-12-13 PROCEDURE — 85025 COMPLETE CBC W/AUTO DIFF WBC: CPT | Performed by: ORTHOPAEDIC SURGERY

## 2017-12-13 PROCEDURE — 97161 PT EVAL LOW COMPLEX 20 MIN: CPT

## 2017-12-13 PROCEDURE — 97110 THERAPEUTIC EXERCISES: CPT

## 2017-12-13 PROCEDURE — 25010000003 CEFAZOLIN IN DEXTROSE 2-4 GM/100ML-% SOLUTION: Performed by: ORTHOPAEDIC SURGERY

## 2017-12-13 RX ORDER — VALSARTAN 320 MG/1
320 TABLET ORAL
Status: DISCONTINUED | OUTPATIENT
Start: 2017-12-13 | End: 2017-12-14

## 2017-12-13 RX ORDER — AMLODIPINE BESYLATE 10 MG/1
10 TABLET ORAL
Status: DISCONTINUED | OUTPATIENT
Start: 2017-12-13 | End: 2017-12-14

## 2017-12-13 RX ORDER — VALSARTAN 320 MG/1
320 TABLET ORAL
Status: DISCONTINUED | OUTPATIENT
Start: 2017-12-14 | End: 2017-12-13

## 2017-12-13 RX ORDER — OXYCODONE AND ACETAMINOPHEN 10; 325 MG/1; MG/1
2 TABLET ORAL EVERY 4 HOURS PRN
Status: DISCONTINUED | OUTPATIENT
Start: 2017-12-13 | End: 2017-12-16 | Stop reason: HOSPADM

## 2017-12-13 RX ADMIN — APIXABAN 2.5 MG: 2.5 TABLET, FILM COATED ORAL at 21:40

## 2017-12-13 RX ADMIN — DOCUSATE SODIUM -SENNOSIDES 2 TABLET: 50; 8.6 TABLET, COATED ORAL at 17:59

## 2017-12-13 RX ADMIN — DOCUSATE SODIUM -SENNOSIDES 2 TABLET: 50; 8.6 TABLET, COATED ORAL at 08:45

## 2017-12-13 RX ADMIN — PANTOPRAZOLE SODIUM 40 MG: 40 TABLET, DELAYED RELEASE ORAL at 07:03

## 2017-12-13 RX ADMIN — OXYCODONE HYDROCHLORIDE AND ACETAMINOPHEN 2 TABLET: 10; 325 TABLET ORAL at 17:59

## 2017-12-13 RX ADMIN — VITAMIN E CAP 400 UNIT 400 UNITS: 400 CAP at 07:03

## 2017-12-13 RX ADMIN — APIXABAN 2.5 MG: 2.5 TABLET, FILM COATED ORAL at 08:44

## 2017-12-13 RX ADMIN — OXYCODONE HYDROCHLORIDE AND ACETAMINOPHEN 2 TABLET: 10; 325 TABLET ORAL at 13:29

## 2017-12-13 RX ADMIN — DULOXETINE HYDROCHLORIDE 120 MG: 60 CAPSULE, DELAYED RELEASE ORAL at 08:45

## 2017-12-13 RX ADMIN — OXYCODONE HYDROCHLORIDE AND ACETAMINOPHEN 1 TABLET: 10; 325 TABLET ORAL at 03:38

## 2017-12-13 RX ADMIN — OXYCODONE HYDROCHLORIDE AND ACETAMINOPHEN 2 TABLET: 10; 325 TABLET ORAL at 22:01

## 2017-12-13 RX ADMIN — FLUOXETINE HYDROCHLORIDE 20 MG: 20 CAPSULE ORAL at 07:03

## 2017-12-13 RX ADMIN — KETOROLAC TROMETHAMINE 30 MG: 30 INJECTION, SOLUTION INTRAMUSCULAR at 10:05

## 2017-12-13 RX ADMIN — KETOROLAC TROMETHAMINE 30 MG: 30 INJECTION, SOLUTION INTRAMUSCULAR at 03:45

## 2017-12-13 RX ADMIN — CEFAZOLIN SODIUM 2 G: 2 INJECTION, SOLUTION INTRAVENOUS at 03:38

## 2017-12-13 RX ADMIN — FLUTICASONE PROPIONATE 2 SPRAY: 50 SPRAY, METERED NASAL at 07:03

## 2017-12-13 RX ADMIN — OXYCODONE HYDROCHLORIDE AND ACETAMINOPHEN 1 TABLET: 10; 325 TABLET ORAL at 07:04

## 2017-12-13 NOTE — PROGRESS NOTES
Discharge Planning Assessment  [facility]     Patient Name: Sophie Ace  MRN: [id number]  Today's Date: 12/13/2017    Admit Date: 12/12/2017          Discharge Needs Assessment       12/13/17 1433    Living Environment    Lives With spouse    Living Arrangements house    Home Accessibility bed and bath are not on the first floor;stairs to enter home    Stair Railings at Home none    Type of Financial/Environmental Concern none    Transportation Available car;family or friend will provide    Living Environment    Provides Primary Care For no one    Quality Of Family Relationships supportive    Discharge Needs Assessment    Equipment Currently Used at Home none            Discharge Plan       12/13/17 1434    Case Management/Social Work Plan    Plan Rehab, home health     Additional Comments Face sheet reviewed, updated face sheet with corrects after discussion with patient.  Met with patient and , Angel Ace ([phone]) at bedside, patient gave permission to speak with  present in the room.   Prior to surgery patient was independent with all aspects of ADL's, did have ambulation limitations.  Uses [facility], denies issues affording or obtaining medications. Discussed discharge plans with patient, provided list of Home Health and rehab facility providers.  Patient and  are concerned that the bed room and bath rooms are on the second floor.  Patient will be going to therapy this afternoon and will evaluate how she progresses with steps, if she does well with steps she has selected [facility], if she does not progress well with steps and therapy she may want to explore rehab for a short period of time, facility selection includes: 1. [facility], 2. [facility] and 3. [facility].  Will wait to notify agencies pending therapy this afternoon.  Will continue to monitor for discharge needs.         Discharge Placement     No information found                Demographic Summary        12/13/17 1353    Referral Information    Admission Type inpatient    Arrived From admitted as an inpatient    Referral Source admission list    Reason For Consult discharge planning    Record Reviewed clinical discipline documentation;history and physical;medical record;patient profile   Met with patient and  Angel [phone].    Primary Care Physician Information    Name Kim Jimenez MD     Phone [phone]            Functional Status       12/13/17 1432    Functional Status Current    Ambulation 3-->assistive equipment and person    Transferring 3-->assistive equipment and person    Toileting 3-->assistive equipment and person    Bathing 2-->assistive person    Dressing 2-->assistive person    Eating 0-->independent    Communication 0-->understands/communicates without difficulty    Functional Status Prior    Ambulation 0-->independent    Transferring 0-->independent    Bathing 0-->independent    Dressing 0-->independent    Eating 0-->independent    Communication 0-->understands/communicates without difficulty    IADL    Medications independent    Meal Preparation independent    Housekeeping independent    Laundry independent    Shopping independent    Oral Care independent    Activity Tolerance    Usual Activity Tolerance good    Current Activity Tolerance moderate             Patricia Mauro RN

## 2017-12-13 NOTE — PLAN OF CARE
Problem: Patient Care Overview (Adult)  Goal: Plan of Care Review  Outcome: Ongoing (interventions implemented as appropriate)    12/13/17 0507   Coping/Psychosocial Response Interventions   Plan Of Care Reviewed With patient   Outcome Evaluation   Outcome Summary/Follow up Plan Pt is alert and oriented, up with assist x1 to [facility], urinating with no problem, had one incontinent episode. Pain has been controlled with PO pain meds but later in shift but received IV Toradol which was helpful, no c/o nausea. Pt BP has been low but pt is asymptomatic, pt educcated on BP monitoring and medications she is currently taking. Will continue to monitor closely.          Problem: Perioperative Period (Adult)  Goal: Signs and Symptoms of Listed Potential Problems Will be Absent or Manageable (Perioperative Period)  Outcome: Ongoing (interventions implemented as appropriate)    12/13/17 0567   Perioperative Period   Problems Assessed (Perioperative Period) all   Problems Present (Perioperative Period) pain         Problem: Knee Replacement, Total (Adult)  Goal: Signs and Symptoms of Listed Potential Problems Will be Absent or Manageable (Knee Replacement, Total)  Outcome: Ongoing (interventions implemented as appropriate)    12/13/17 0589   Knee Replacement, Total   Problems Assessed (Total Knee Replacement) all   Problems Present (Total Knee Replacement) pain;decreased range of motion;functional decline/self care deficit         Problem: Fall Risk (Adult)  Goal: Absence of Falls  Outcome: Ongoing (interventions implemented as appropriate)    12/13/17 0520   Fall Risk (Adult)   Absence of Falls making progress toward outcome

## 2017-12-13 NOTE — PROGRESS NOTES
"Ortho POD 1    Patients Name:  Sophie Ace  YOB: 1965  Medical Records Number:  [id number]    No complaints    BP 95/58 (BP Location: Right arm, Patient Position: Sitting)  Pulse 81  Temp 97.6 °F (36.4 °C) (Oral)   Resp 16  Ht 162.6 cm (64.02\")  Wt 113 kg (250 lb)  SpO2 100%  BMI 42.89 kg/m2    RLE:  NVI, calf nontender, sensation intact  No signs of DVT    Incision: clean, intact    Drain: 700/500    Lab Results (last 24 hours)     Procedure Component Value Units Date/Time    CBC & Differential [[id number]] Collected:  12/13/17 0357    Specimen:  Blood Updated:  12/13/17 0439    Narrative:       The following orders were created for panel order CBC & Differential.  Procedure                               Abnormality         Status                     ---------                               -----------         ------                     CBC Auto Differential[[id number]]        Abnormal            Final result                 Please view results for these tests on the individual orders.    CBC Auto Differential [[id number]]  (Abnormal) Collected:  12/13/17 0357    Specimen:  Blood Updated:  12/13/17 0439     WBC 7.61 10*3/mm3      RBC 3.46 (L) 10*6/mm3      Hemoglobin 10.3 (L) g/dL      Hematocrit 32.8 (L) %      MCV 94.8 fL      MCH 29.8 pg      MCHC 31.4 (L) g/dL      RDW 13.6 (H) %      RDW-SD 46.8 fl      MPV 11.6 fL      Platelets 152 10*3/mm3      Neutrophil % 76.2 (H) %      Lymphocyte % 11.4 (L) %      Monocyte % 9.9 %      Eosinophil % 1.8 %      Basophil % 0.3 %      Immature Grans % 0.4 %      Neutrophils, Absolute 5.80 10*3/mm3      Lymphocytes, Absolute 0.87 (L) 10*3/mm3      Monocytes, Absolute 0.75 10*3/mm3      Eosinophils, Absolute 0.14 10*3/mm3      Basophils, Absolute 0.02 10*3/mm3      Immature Grans, Absolute 0.03 10*3/mm3           Xr Knee 1 Or 2 View Left    Result Date: 12/12/2017  Narrative: LEFT KNEE 2 VIEWS 12/12/2017  HISTORY: Postop left knee arthroplasty.  FINDINGS:  " The distal femoral and proximal tibia components of the left knee prosthesis are well-seated with no abnormal surrounding bony lucencies. Soft tissue air, skin staples and drainage catheter are seen. No fracture or subluxation is seen.      Impression: Satisfactory postoperative appearance of the left knee.          S/p Left revision of uni to TKA  PT-WBAT with walker  Eliquis for DVT prophylaxis

## 2017-12-13 NOTE — PROGRESS NOTES
Continued Stay Note  [facility]     Patient Name: Sophie Ace  MRN: [id number]  Today's Date: 12/13/2017    Admit Date: 12/12/2017          Discharge Plan       12/13/17 1638    Case Management/Social Work Plan    Plan Home with [facility]     Patient/Family In Agreement With Plan yes    Additional Comments Met with patient at bedside, after her PT today.  Patient states that she will be able to manage the step at home, plan will be to go home with Home Health.  Provided patient a list of providers, patient selected [facility].  Notified Ashley with [facility], they will follow patient.  Will continue to monitor for discharge needs.      12/13/17 1434    Case Management/Social Work Plan    Plan Rehab, home health     Additional Comments Face sheet reviewed, updated face sheet with corrects after discussion with patient.  Met with patient and , Angel Ace ([phone]) at bedside, patient gave permission to speak with  present in the room.   Prior to surgery patient was independent with all aspects of ADL's, did have ambulation limitations.  Uses [facility], denies issues affording or obtaining medications. Discussed discharge plans with patient, provided list of Home Health and rehab facility providers.  Patient and  are concerned that the bed room and bath rooms are on the second floor.  Patient will be going to therapy this afternoon and will evaluate how she progresses with steps, if she does well with steps she has selected [facility], if she does not progress well with steps and therapy she may want to explore rehab for a short period of time, facility selection includes: 1. [facility], 2. [facility] and 3. [facility].  Will wait to notify agencies pending therapy this afternoon.  Will continue to monitor for discharge needs.               Discharge Codes     None            Patricia Mauro RN

## 2017-12-13 NOTE — DISCHARGE PLACEMENT REQUEST
"Alon Rodriguez (52 y.o. Female)     Date of Birth Social Security Number Address Home Phone MRN    1965  [address] [place] [phone] [id number]    Adventism Marital Status          Islam        Admission Date Admission Type Admitting Provider Attending Provider Department, Room/Bed    12/12/17 Elective Bhanu Baxter MD Stearns, Zack R, MD [address], P788/1    Discharge Date Discharge Disposition Discharge Destination                      Attending Provider: Bhanu Baxter MD     Allergies:  Erythromycin, Morphine, Penicillins, Sulfa Antibiotics, Tetracyclines & Related    Isolation:  None   Infection:  None   Code Status:  FULL    Ht:  162.6 cm (64.02\")   Wt:  113 kg (250 lb)    Admission Cmt:  None   Principal Problem:  None                Active Insurance as of 12/12/2017     Primary Coverage     Payor Plan Insurance Group Employer/Plan Group    [employer] PPO [id number]     Payor Plan Address Payor Plan Phone Number Effective From Effective To    [address] [phone] 6/1/2014     [place] [address]       Subscriber Name Subscriber Birth Date Member ID       ALON RODRIGUEZ 1965 [id number]                 Emergency Contacts      (Rel.) Home Phone Work Phone Mobile Phone    Angel Rodriguez (Spouse) [phone] -- [phone]              "

## 2017-12-13 NOTE — PLAN OF CARE
Chief Complaint  Consultation (mass of the parotid gland)      History of Present Illness  Alisa Cerda is a 26 year old female seen today for surgical consultation for a parotid mass. The patient reports a 2-3 year history of an awareness of a left parotid mass. As was first diagnosed when she was living in [place] and she did have a fine-needle aspiration biopsy which showed a pleomorphic adenoma. She has also been seen recently by Dr. Roman and subsequently referred for surgery. The patient reports that other than the presence of the mass she is otherwise asymptomatic. She is also in generally good health. She has recently delivered a healthy baby and is breast-feeding. She has no other ear nose and throat complaints. She does note that she will be moving to [facility] in the fall with her  who is in the .    Review of Systems  Comprehensive review of systems reviewed with patient.  Pertinent positives are listed in HPI    Past Medical History  The past medical history was reviewed on EPIC    Medications  Current Medications    ACETAMINOPHEN (TYLENOL) 325 MG TABLET    1 to 2 tablets by mouth every 3 to 4 hours as needed for pain.    FERROUS SULFATE 325 (65 FE) MG EC TABLET    Take 1 tablet by mouth 2 times daily. with food    IBUPROFEN (MOTRIN) 600 MG TABLET    Take 1 tablet by mouth every 6 hours as needed for Pain.    PRENATAL VIT-FE FUMARATE-FA (MULTIVITAMIN & MINERAL W/FOLIC ACID- PRENATAL) 27-1 MG TAB    Take 1 tablet by mouth daily.        Allergies  ALLERGIES: no known allergies.        Physical Examination    Body mass index is 27 kg/(m^2).    General/Constitutional: patient in no distress, speech and affect normal  Respiration:  Quiet and unlabored, no stridor nor dysphonia  External Inspection Face/Neck: no lesions, swelling, nor erythema  External Ears: no swelling erythema nor tenderness  Otoscopic Exam: normal canals, normal drums  Anterior Rhinoscopy: patent airway, no pus nor  Problem: Patient Care Overview (Adult)  Goal: Plan of Care Review    12/13/17 0919   Coping/Psychosocial Response Interventions   Plan Of Care Reviewed With patient   Outcome Evaluation   Outcome Summary/Follow up Plan pt presents w. generalized weakness POD 1 L TK rev, good tolerance to ambulation this AM and therex. may benefit from skilled PT prior to DC home HHC, will cont to prepare.          Problem: Inpatient Physical Therapy  Goal: Gait Training Goal LTG- PT    12/13/17 0919   Gait Training PT LTG   Gait Training Goal PT LTG, Date Established 12/13/17   Gait Training Goal PT LTG, Time to Achieve 3 days   Gait Training Goal PT LTG, [place] conditional independence   Gait Training Goal PT LTG, Assist Device walker, rolling   Gait Training Goal PT LTG, Distance to Achieve 150       Goal: Stair Training Goal LTG- PT    12/13/17 0919   Stair Training PT LTG   Stair Training Goal PT LTG, Date Established 12/13/17   Stair Training Goal PT LTG, Time to Achieve 3 days   Stair Training Goal PT LTG, Number of Steps 4   Stair Training Goal PT LTG, [place] contact guard assist   Stair Training Goal PT LTG, Assist Device 2 handrails       Goal: Range of Motion Goal LTG- PT    12/13/17 0919   Range of Motion PT LTG   Range of Motion Goal PT LTG, Date Established 12/13/17   Range of Motion Goal PT LTG, Time to Achieve 3 days   Range fo Motion Goal PT LTG, Joint L knee   Range of Motion Goal PT LTG, AROM Measure 0-90            polyp  Oral Exam: no oral lesions, dentition in satisfactory repair, tongue mobility normal  Pharyngeal Exam: no pharyngeal lesions, no erythema nor exudate, palate elevates midline  Larynx: Normal voice  Neck/Carotid: no adenopathy  Thyroid no thyromegaly  Trachea: midline nontender  Salivary Glands: Firm mobile nontender approximately 3 cm mass in the tail of the left parotid  Cranial Nerves/Neurologic Exam: 2-12 normal, patient alert and oriented  Extraocular Exam: no periorbital swelling nor erythema      Images/Labs          Audiometric Data Review:       Procedures:      IMPRESSION  Left parotid pleomorphic adenoma    PLAN   I have recommended left superficial parotidectomy with facial nerve monitoring and intraoperative facial nerve dissection. The rationale the risks and the alternatives including but not limited to the risks of facial nerve paresis, Miranda syndrome, and auricular hypoesthesia were discussed. Patient expresses an understanding and consents to proceed

## 2017-12-13 NOTE — PLAN OF CARE
Problem: Patient Care Overview (Adult)  Goal: Plan of Care Review  Outcome: Ongoing (interventions implemented as appropriate)    12/13/17 1801   Coping/Psychosocial Response Interventions   Plan Of Care Reviewed With patient   Patient Care Overview   Progress improving   Outcome Evaluation   Outcome Summary/Follow up Plan Pt c/o pain not controlled with current pain medication. Order to increase pain medication to 2 tabs . Pain controlled much better. Up to chair today and worked with therapy, Educated pt on moniitoring BP and medication r/t h/o HTN       Goal: Adult Individualization and Mutuality  Outcome: Ongoing (interventions implemented as appropriate)    Problem: Perioperative Period (Adult)  Goal: Signs and Symptoms of Listed Potential Problems Will be Absent or Manageable (Perioperative Period)  Outcome: Ongoing (interventions implemented as appropriate)    Problem: Knee Replacement, Total (Adult)  Goal: Signs and Symptoms of Listed Potential Problems Will be Absent or Manageable (Knee Replacement, Total)  Outcome: Ongoing (interventions implemented as appropriate)    Problem: Fall Risk (Adult)  Goal: Absence of Falls  Outcome: Ongoing (interventions implemented as appropriate)

## 2017-12-13 NOTE — THERAPY EVALUATION
Acute Care - Physical Therapy Initial Evaluation  [facility]     Patient Name: Sophie Ace  : 1965  MRN: [id number]  Today's Date: 2017   Onset of Illness/Injury or Date of Surgery Date: 17  Date of Referral to PT: 17  Referring Physician: Vee      Admit Date: 2017     Visit Dx:    ICD-10-CM ICD-9-CM   1. Pain due to internal orthopedic prosthetic devices, implants and grafts, initial encounter T84.84XA 996.78     338.18   2. Impaired gait and mobility R26.89 781.2     Patient Active Problem List   Diagnosis   • Pain due to internal orthopedic prosthetic devices, implants and grafts, initial encounter     Past Medical History:   Diagnosis Date   • Anxiety and depression    • Arthritis    • Gastric reflux    • Hypertension    • IBS (irritable bowel syndrome)    • Limited joint range of motion     LEFT KNEE   • Limited range of motion (ROM) of shoulder     RIGHT   • Rotator cuff tear     RIGHT   • Seasonal allergies    • Sleep apnea     USES C-PAP   • Vitamin B 12 deficiency    • Vitamin D deficiency      Past Surgical History:   Procedure Laterality Date   •  SECTION  1987   1992    X3   • COLONOSCOPY  2015   • D&C HYSTEROSCOPY ENDOMETRIAL ABLATION  2014   • ESOPHAGEAL DILATATION      X 5   • HIATAL HERNIA REPAIR  2012    WITH LAP BAND   • INGUINAL HERNIA REPAIR Left    • KNEE MINI REVISION Left 2017   • LAPAROSCOPIC CHOLECYSTECTOMY     • LAPAROSCOPIC GASTRIC BANDING     • PARTIAL KNEE ARTHROPLASTY Left    • TUBAL ABDOMINAL LIGATION  1992   • UMBILICAL HERNIA REPAIR  1965   • VENTRAL HERNIA REPAIR            PT ASSESSMENT (last 72 hours)      PT Evaluation       17 0915 17 1203    Rehab Evaluation    Document Type evaluation  -LH     Subjective Information agree to therapy  -LH     Patient Effort, Rehab Treatment good  -LH     Symptoms Noted During/After Treatment none  -LH     General Information    Patient Profile Review yes   -     Onset of Illness/Injury or Date of Surgery Date 12/12/17  -     Referring Physician Vee  -     General Observations supine in bed no acute distress  -     Pertinent History Of Current Problem POD 1 L TK rev  -     Precautions/Limitations fall precautions  -     Prior Level of Function independent:  -     Plans/Goals Discussed With patient  -     Risks Reviewed patient:  -     Benefits Reviewed patient:  -     Living Environment    Lives With  spouse  -    Living Arrangements  house  -TW    Home Accessibility  bed and bath are not on the first floor;stairs to enter home;stairs within home  -TW    Number of Stairs to Enter Home  2  -TW    Number of Stairs Within Home  26  -TW    Transportation Available  car;family or friend will provide  -TW    Clinical Impression    Date of Referral to PT 12/13/17  -     Criteria for Skilled Therapeutic Interventions Met yes  -     Pathology/Pathophysiology Noted (Describe Specifically for Each System) musculoskeletal;neuromuscular  -     Impairments Found (describe specific impairments) gait, locomotion, and balance;ROM  -     Functional Limitations in Following Categories (Describe Specific Limitations) self-care;home management  -     Rehab Potential good, to achieve stated therapy goals  -     Pain Assessment    Pain Assessment No/denies pain  -     Vision Assessment/Intervention    Visual Impairment WNL  -     Cognitive Assessment/Intervention    Current Cognitive/Communication Assessment functional  -     Orientation Status oriented x 4  -     ROM (Range of Motion)    General ROM no range of motion deficits identified  -     General ROM Detail except L knee  -     MMT (Manual Muscle Testing)    General MMT Assessment no strength deficits identified  -     General MMT Assessment Detail except L knee  -     Bed Mobility, Assessment/Treatment    Bed Mob, Supine to Sit, [place] conditional independence  -     Bed  Mob, Sit to Supine, [place] not tested  -     Transfer Assessment/Treatment    Transfers, Sit-Stand [place] conditional independence  -     Transfers, Stand-Sit [place] conditional independence  -     Transfers, Sit-Stand-Sit, Assist Device standard walker  -     Gait Assessment/Treatment    Gait, [place] Level stand by assist  -     Gait, Assistive Device standard walker  -     Gait, Distance (Feet) 80  -     Gait, Gait Pattern Analysis swing-to gait  -     Gait, Gait Deviations bilateral:;yessica decreased;decreased heel strike;forward flexed posture  -     Therapy Exercises    Exercise Protocols total knee  -     Total Knee Exercises left:;10 reps;completed protocol  -     Positioning and Restraints    Pre-Treatment Position in bed  -     Post Treatment Position chair  -     In Chair reclined;call light within reach;encouraged to call for assist;exit alarm on;with nsg  -       12/12/17 1200 12/11/17 1343    General Information    Equipment Currently Used at Home none  -TW respiratory supplies  -ME    Living Environment    Lives With  spouse  -ME    Living Arrangements  house  -ME    Home Accessibility  bed and bath are not on the first floor  -ME    Stair Railings at Home  none  -ME    Type of Financial/Environmental Concern  none  -ME    Transportation Available  car  -ME      User Key  (r) = Recorded By, (t) = Taken By, (c) = Cosigned By    Initials Name Provider Type     Ashley Miller, PT Physical Therapist    ME Leandra Conley, RN Registered Nurse     Billie Chandler, RN Registered Nurse          Physical Therapy Education     Title: PT OT SLP Therapies (Done)     Topic: Physical Therapy (Done)     Point: Mobility training (Done)    Learning Progress Summary    Learner Readiness Method Response Comment Documented by Status   Patient Acceptance TIFFANIE DUMONT   12/13/17 0967 Done               Point: Home exercise program (Done)    Learning Progress Summary     Learner Readiness Method Response Comment Documented by Status   Patient Acceptance E ASHLEY,NR   12/13/17 0918 Done               Point: Body mechanics (Done)    Learning Progress Summary    Learner Readiness Method Response Comment Documented by Status   Patient Acceptance E ASHLEYNR   12/13/17 0918 Done               Point: Precautions (Done)    Learning Progress Summary    Learner Readiness Method Response Comment Documented by Status   Patient Acceptance E ASHLEYNR   12/13/17 0918 Done                      User Key     Initials Effective Dates Name Provider Type Discipline     02/07/17 -  Ashley Miller, PT Physical Therapist PT                PT Recommendation and Plan  Anticipated Discharge Disposition: home with home health  Planned Therapy Interventions: balance training, bed mobility training, gait training, home exercise program, ROM (Range of Motion), stair training, strengthening, stretching, transfer training  PT Frequency: 2 times/day  Plan of Care Review  Plan Of Care Reviewed With: patient  Outcome Summary/Follow up Plan: pt presents w. generalized weakness POD 1 L TK rev, good tolerance to ambulation this AM and therex. may benefit from skilled PT prior to DC home C, will cont to prepare.           IP PT Goals       12/13/17 0919          Gait Training PT LTG    Gait Training Goal PT LTG, Date Established 12/13/17  -      Gait Training Goal PT LTG, Time to Achieve 3 days  -      Gait Training Goal PT LTG, [place] Level conditional independence  -      Gait Training Goal PT LTG, Assist Device walker, rolling  -      Gait Training Goal PT LTG, Distance to Achieve 150  -      Stair Training PT LTG    Stair Training Goal PT LTG, Date Established 12/13/17  -      Stair Training Goal PT LTG, Time to Achieve 3 days  -      Stair Training Goal PT LTG, Number of Steps 4  -      Stair Training Goal PT LTG, [place] Level contact guard assist  -      Stair Training Goal PT LTG, Assist  Device 2 handrails  -      Range of Motion PT LTG    Range of Motion Goal PT LTG, Date Established 12/13/17  -      Range of Motion Goal PT LTG, Time to Achieve 3 days  -LH      Range fo Motion Goal PT LTG, Joint L knee  -      Range of Motion Goal PT LTG, AROM Measure 0-90  -        User Key  (r) = Recorded By, (t) = Taken By, (c) = Cosigned By    Initials Name Provider Type     Ashley Miller PT Physical Therapist                Outcome Measures       12/13/17 0900          How much help from another person do you currently need...    Turning from your back to your side while in flat bed without using bedrails? 4  -LH      Moving from lying on back to sitting on the side of a flat bed without bedrails? 4  -LH      Moving to and from a bed to a chair (including a wheelchair)? 4  -LH      Standing up from a chair using your arms (e.g., wheelchair, bedside chair)? 4  -LH      Climbing 3-5 steps with a railing? 3  -LH      To walk in hospital room? 3  -      AM-PAC 6 Clicks Score 22  -      Functional Assessment    Outcome Measure Options AM-PAC 6 Clicks Basic Mobility (PT)  -        User Key  (r) = Recorded By, (t) = Taken By, (c) = Cosigned By    Initials Name Provider Type     Ashley Miller PT Physical Therapist           Time Calculation:         PT Charges       12/13/17 0921          Time Calculation    Start Time 0855  -      Stop Time 0912  -      Time Calculation (min) 17 min  -      PT Received On 12/13/17  -      PT - Next Appointment 12/13/17  -      PT Goal Re-Cert Due Date 12/16/17  -        User Key  (r) = Recorded By, (t) = Taken By, (c) = Cosigned By    Initials Name Provider Type     Ashley Miller PT Physical Therapist          Therapy Charges for Today     Code Description Service Date Service Provider Modifiers Qty    06207503599 HC PT EVAL LOW COMPLEXITY 2 12/13/2017 Ashley Miller PT GP 1          PT G-Codes  Outcome Measure Options: AM-PAC 6 Clicks Basic Mobility  (PT)      Ashley Miller, PT  12/13/2017

## 2017-12-13 NOTE — THERAPY TREATMENT NOTE
Acute Care - Physical Therapy Treatment Note  [facility]     Patient Name: Sophie Ace  : 1965  MRN: [id number]  Today's Date: 2017  Onset of Illness/Injury or Date of Surgery Date: 17  Date of Referral to PT: 17  Referring Physician: Vee    Admit Date: 2017    Visit Dx:    ICD-10-CM ICD-9-CM   1. Pain due to internal orthopedic prosthetic devices, implants and grafts, initial encounter T84.84XA 996.78     338.18   2. Impaired gait and mobility R26.89 781.2     Patient Active Problem List   Diagnosis   • Pain due to internal orthopedic prosthetic devices, implants and grafts, initial encounter               Adult Rehabilitation Note       17 1345          Rehab Assessment/Intervention    Discipline physical therapist  -AR      Document Type therapy note (daily note)  -AR      Subjective Information agree to therapy  -AR      Patient Effort, Rehab Treatment good  -AR      Precautions/Limitations fall precautions  -AR      Recorded by [AR] Dee Bradley, PT      Pain Assessment    Pain Assessment 0-10  -AR      Pain Score 8  -AR      Pain Type Surgical pain  -AR      Pain Location Knee  -AR      Pain Orientation Left  -AR      Pain Intervention(s) Repositioned;Cold applied;Medication (See MAR)  -AR      Recorded by [AR] Dee Bradley, PT      Vision Assessment/Intervention    Visual Impairment WNL  -AR      Recorded by [AR] Dee Bradley PT      Cognitive Assessment/Intervention    Current Cognitive/Communication Assessment functional  -AR      Orientation Status oriented x 4  -AR      Recorded by [AR] Dee Bradley PT      ROM (Range of Motion)    General ROM Detail -10-64  -AR      Recorded by [AR] Dee Bradley PT      Bed Mobility, Assessment/Treatment    Bed Mob, Supine to Sit, [place] not tested  -AR      Bed Mob, Sit to Supine, [place] not tested  -AR      Recorded by [AR] Dee Bradley PT      Transfer Assessment/Treatment    Transfers,  Sit-Stand [place] conditional independence  -AR      Transfers, Stand-Sit [place] conditional independence  -AR      Transfers, Sit-Stand-Sit, Assist Device standard walker  -AR      Recorded by [AR] Dee Bradley PT      Gait Assessment/Treatment    Gait, [place] Level stand by assist  -AR      Gait, Assistive Device standard walker  -AR      Gait, Distance (Feet) 40  -AR      Gait, Gait Pattern Analysis swing-to gait  -AR      Gait, Gait Deviations antalgic;yessica decreased;decreased heel strike  -AR      Gait, Safety Issues step length decreased  -AR      Gait, Impairments strength decreased;ROM decreased  -AR      Gait, Comment limited by pain  -AR      Recorded by [AR] Dee Bradley PT      Stairs Assessment/Treatment    Stairs, Comment pt declined 2/2 pain  -AR      Recorded by [AR] Dee Bradley PT      Therapy Exercises    Exercise Protocols total knee  -AR      Total Knee Exercises left:;15 reps;completed protocol  -AR      Recorded by [AR] Dee Bradley PT      Positioning and Restraints    Pre-Treatment Position sitting in chair/recliner  -AR      Post Treatment Position chair  -AR      In Chair sitting;reclined;call light within reach;encouraged to call for assist;exit alarm on  -AR      Recorded by [AR] Dee Bradley PT        User Key  (r) = Recorded By, (t) = Taken By, (c) = Cosigned By    Initials Name Effective Dates    MARK Bradley PT 06/27/16 -                 IP PT Goals       12/13/17 0919          Gait Training PT LTG    Gait Training Goal PT LTG, Date Established 12/13/17  -      Gait Training Goal PT LTG, Time to Achieve 3 days  -      Gait Training Goal PT LTG, [place] Level conditional independence  -LH      Gait Training Goal PT LTG, Assist Device walker, rolling  -LH      Gait Training Goal PT LTG, Distance to Achieve 150  -LH      Stair Training PT LTG    Stair Training Goal PT LTG, Date Established 12/13/17  -      Stair Training Goal PT  LTG, Time to Achieve 3 days  -      Stair Training Goal PT LTG, Number of Steps 4  -      Stair Training Goal PT LTG, [place] Level contact guard assist  -      Stair Training Goal PT LTG, Assist Device 2 handrails  -      Range of Motion PT LTG    Range of Motion Goal PT LTG, Date Established 12/13/17  -      Range of Motion Goal PT LTG, Time to Achieve 3 days  -      Range fo Motion Goal PT LTG, Joint L knee  -      Range of Motion Goal PT LTG, AROM Measure 0-90  -        User Key  (r) = Recorded By, (t) = Taken By, (c) = Cosigned By    Initials Name Provider Type     Ashley Miller, PT Physical Therapist          Physical Therapy Education     Title: PT OT SLP Therapies (Done)     Topic: Physical Therapy (Done)     Point: Mobility training (Done)    Learning Progress Summary    Learner Readiness Method Response Comment Documented by Status   Patient Acceptance E VU,NR   12/13/17 0918 Done               Point: Home exercise program (Done)    Learning Progress Summary    Learner Readiness Method Response Comment Documented by Status   Patient Acceptance E VU,NR   12/13/17 0918 Done               Point: Body mechanics (Done)    Learning Progress Summary    Learner Readiness Method Response Comment Documented by Status   Patient Acceptance E VU,NR   12/13/17 0918 Done               Point: Precautions (Done)    Learning Progress Summary    Learner Readiness Method Response Comment Documented by Status   Patient Acceptance E VU,NR   12/13/17 0918 Done                      User Key     Initials Effective Dates Name Provider Type [facility] 02/07/17 -  Ashley Miller, PT Physical Therapist PT                    PT Recommendation and Plan  Anticipated Discharge Disposition: home with home health  Planned Therapy Interventions: balance training, bed mobility training, gait training, home exercise program, ROM (Range of Motion), stair training, strengthening, stretching, transfer  training  PT Frequency: 2 times/day             Outcome Measures       12/13/17 1600 12/13/17 0900       How much help from another person do you currently need...    Turning from your back to your side while in flat bed without using bedrails? 4  -AR 4  -LH     Moving from lying on back to sitting on the side of a flat bed without bedrails? 4  -AR 4  -LH     Moving to and from a bed to a chair (including a wheelchair)? 4  -AR 4  -LH     Standing up from a chair using your arms (e.g., wheelchair, bedside chair)? 4  -AR 4  -LH     Climbing 3-5 steps with a railing? 3  -AR 3  -LH     To walk in hospital room? 3  -AR 3  -LH     AM-PAC 6 Clicks Score 22  -AR 22  -     Functional Assessment    Outcome Measure Options  AM-PAC 6 Clicks Basic Mobility (PT)  -       User Key  (r) = Recorded By, (t) = Taken By, (c) = Cosigned By    Initials Name Provider Type     Ashley Miller, PT Physical Therapist    AR Dee Bradley, PT Physical Therapist           Time Calculation:         PT Charges       12/13/17 1638 12/13/17 0921       Time Calculation    Start Time 1345  -AR 0855  -     Stop Time 1435  -AR 0912  -     Time Calculation (min) 50 min  -AR 17 min  -     PT Received On 12/13/17  -AR 12/13/17  -     PT - Next Appointment 12/14/17  -AR 12/13/17  -     PT Goal Re-Cert Due Date  12/16/17  -       User Key  (r) = Recorded By, (t) = Taken By, (c) = Cosigned By    Initials Name Provider Type     Ashlye Miller, PT Physical Therapist    AR Dee Bradley, PT Physical Therapist          Therapy Charges for Today     Code Description Service Date Service Provider Modifiers Qty    26062985917 HC PT THER PROC EA 15 MIN 12/13/2017 Dee Bradley, PT GP 1    11416013619 HC PT THER PROC GROUP 12/13/2017 Dee Bradley, PT GP 1          PT G-Codes  Outcome Measure Options: AM-PAC 6 Clicks Basic Mobility (PT)    Dee Bradley PT  12/13/2017

## 2017-12-13 NOTE — CONSULTS
Name: Sophie Ace ADMIT: 2017   : 1965  PCP: No Known Provider    MRN: [id number] LOS: 1 days   AGE/SEX: 52 y.o. female  ROOM: [facility] [place]/     No chief complaint on file.  Inpatient Consult to Hospitalist  Consult performed by: ESAU ROBISON  Consult ordered by: SHANA GUARDADO  Reason for consult: HTN          Subjective   Ms. Ace is a 52 y.o. female who was admitted to Orthopedic Surgery and has undergone L TKA revision. She reports her BP has been somewhat labile recently due to fluctiations in weight. She had lab band surgery and lost 100 lbs and was off her HTN medication at that point. She then gained weight and was placed on Exforge and HCTZ. She has since lost additional weight. She denies any lightheadedness or dizziness but does report generalized fatigue. No CP SOA or palpitations. She also takes chronic opiate therapy and her pain is moderately controlled currently.      History of Present Illness    Past Medical History:   Diagnosis Date   • Anxiety and depression    • Arthritis    • Gastric reflux    • Hypertension    • IBS (irritable bowel syndrome)    • Limited joint range of motion     LEFT KNEE   • Limited range of motion (ROM) of shoulder     RIGHT   • Rotator cuff tear     RIGHT   • Seasonal allergies    • Sleep apnea     USES C-PAP   • Vitamin B 12 deficiency    • Vitamin D deficiency      Past Surgical History:   Procedure Laterality Date   •  SECTION  1987   1992    X3   • COLONOSCOPY     • D&C HYSTEROSCOPY ENDOMETRIAL ABLATION  2014   • ESOPHAGEAL DILATATION      X 5   • HIATAL HERNIA REPAIR  2012    WITH LAP BAND   • INGUINAL HERNIA REPAIR Left    • KNEE MINI REVISION Left 2017   • LAPAROSCOPIC CHOLECYSTECTOMY     • LAPAROSCOPIC GASTRIC BANDING     • PARTIAL KNEE ARTHROPLASTY Left    • TUBAL ABDOMINAL LIGATION     • UMBILICAL HERNIA REPAIR  1965   • VENTRAL HERNIA REPAIR       Family History   Problem Relation Age of Onset   •  Malig Hyperthermia Neg Hx      Social History   Substance Use Topics   • Smoking status: Never Smoker   • Smokeless tobacco: Never Used   • Alcohol use No     Prescriptions Prior to Admission   Medication Sig Dispense Refill Last Dose   • amLODIPine-valsartan (EXFORGE)  MG per tablet Take 1 tablet by mouth Every Morning.   12/12/2017 at 0930   • Calcium Carbonate (CALCIUM 600 PO) Take 1 tablet by mouth 2 (Two) Times a Day.   12/11/2017 at 1200   • celecoxib (CeleBREX) 200 MG capsule Take 200 mg by mouth Daily.   12/11/2017 at 1200   • Cholecalciferol (VITAMIN D PO) Take 1,000 mg by mouth Every Morning.   12/11/2017 at 1200   • Cyanocobalamin (VITAMIN B-12) 1000 MCG sublingual tablet Place 1 tablet under the tongue Every Morning.   12/11/2017 at 1200   • DULoxetine (CYMBALTA) 60 MG capsule Take 120 mg by mouth Daily. TAKES 2 60 MG TAB   12/11/2017 at 1200   • esomeprazole (nexIUM) 40 MG capsule Take 40 mg by mouth Every Morning Before Breakfast.   12/12/2017 at 0930   • FLUoxetine (PROZAC) 20 MG capsule Take 20 mg by mouth Every Morning.   12/11/2017 at 1200   • fluticasone (FLONASE) 50 MCG/ACT nasal spray 2 sprays by Each Nare route Every Morning.   12/11/2017 at 1200   • Glucosamine-Chondroit-Vit C-Mn (GLUCOSAMINE 1500 COMPLEX PO) Take 3,000 mg by mouth Every Morning.   12/11/2017 at 1200   • hydrochlorothiazide (HYDRODIURIL) 12.5 MG tablet Take 12.5 mg by mouth Daily.   12/11/2017 at 1200   • Multiple Vitamins-Minerals (MULTIVITAMIN ADULTS PO) Take 1 tablet by mouth Every Morning.   12/11/2017 at 1200n   • oxyCODONE-acetaminophen (PERCOCET)  MG per tablet Take 1 tablet by mouth Every 6 (Six) Hours As Needed for Moderate Pain .   12/11/2017 at 1800   • vitamin E 400 UNIT capsule Take 400 Units by mouth Every Morning.   12/11/2017 at 1200   • aspirin 81 MG EC tablet Take 81 mg by mouth Every Morning.   over 2 week     Allergies:    Allergies   Allergen Reactions   • Erythromycin Swelling     Throat  swelling   • Morphine Itching and Irritability     ALSO ANXIETY, ITCHING, INSOMNIA   • Penicillins Swelling     Throat.STATES CAN TAKE KEFLEX   • Sulfa Antibiotics Anaphylaxis   • Tetracyclines & Related Swelling     throat       Review of Systems   Constitutional: Negative for chills and fever.   HENT: Positive for sore throat. Negative for trouble swallowing.    Eyes: Negative for redness and visual disturbance.   Respiratory: Negative for cough, shortness of breath and wheezing.    Cardiovascular: Negative for chest pain and palpitations.   Gastrointestinal: Negative for constipation, diarrhea, nausea and vomiting.   Endocrine: Negative for cold intolerance and heat intolerance.   Genitourinary: Negative for difficulty urinating and dysuria.   Musculoskeletal: Negative for neck pain and neck stiffness.   Skin: Negative for pallor and rash.   Allergic/Immunologic: Negative for environmental allergies and food allergies.   Neurological: Negative for seizures and syncope.   Hematological: Negative for adenopathy.   Psychiatric/Behavioral: Negative for agitation and confusion.        Objective    Vital Signs  Temp:  [97 °F (36.1 °C)-99.4 °F (37.4 °C)] 97.5 °F (36.4 °C)  Heart Rate:  [] 94  Resp:  [13-20] 16  BP: ()/(53-77) 101/74  SpO2:  [96 %-100 %] 97 %  on  Flow (L/min):  [2-4] 2;   O2 Device: room air  Body mass index is 42.89 kg/(m^2).    Physical Exam   Constitutional: She is oriented to person, place, and time. She appears well-developed and well-nourished. No distress.   HENT:   Head: Normocephalic and atraumatic.   Eyes: EOM are normal. Pupils are equal, round, and reactive to light.   Neck: Normal range of motion. Neck supple.   Cardiovascular: Normal rate, regular rhythm, normal heart sounds and intact distal pulses.    Pulmonary/Chest: Effort normal and breath sounds normal. She has no wheezes.   Abdominal: Soft. Bowel sounds are normal. She exhibits no distension. There is no tenderness.    Musculoskeletal: She exhibits no edema.   LLE dressed   Neurological: She is alert and oriented to person, place, and time. No cranial nerve deficit.   Skin: Skin is warm and dry. She is not diaphoretic.   Psychiatric: She has a normal mood and affect. Her behavior is normal.   Nursing note and vitals reviewed.      Results Review:   I reviewed the patient's new clinical results. Reviewed imaging, agree with interpretation. Reviewed EKG, sinus rhythm, no st changes. Reviewed prior records.    Lab Results (last 24 hours)     Procedure Component Value Units Date/Time    CBC & Differential [[id number]] Collected:  12/13/17 0357    Specimen:  Blood Updated:  12/13/17 0439    Narrative:       The following orders were created for panel order CBC & Differential.  Procedure                               Abnormality         Status                     ---------                               -----------         ------                     CBC Auto Differential[[id number]]        Abnormal            Final result                 Please view results for these tests on the individual orders.    CBC Auto Differential [[id number]]  (Abnormal) Collected:  12/13/17 0357    Specimen:  Blood Updated:  12/13/17 0439     WBC 7.61 10*3/mm3      RBC 3.46 (L) 10*6/mm3      Hemoglobin 10.3 (L) g/dL      Hematocrit 32.8 (L) %      MCV 94.8 fL      MCH 29.8 pg      MCHC 31.4 (L) g/dL      RDW 13.6 (H) %      RDW-SD 46.8 fl      MPV 11.6 fL      Platelets 152 10*3/mm3      Neutrophil % 76.2 (H) %      Lymphocyte % 11.4 (L) %      Monocyte % 9.9 %      Eosinophil % 1.8 %      Basophil % 0.3 %      Immature Grans % 0.4 %      Neutrophils, Absolute 5.80 10*3/mm3      Lymphocytes, Absolute 0.87 (L) 10*3/mm3      Monocytes, Absolute 0.75 10*3/mm3      Eosinophils, Absolute 0.14 10*3/mm3      Basophils, Absolute 0.02 10*3/mm3      Immature Grans, Absolute 0.03 10*3/mm3           XR Knee 1 or 2 View Left   Preliminary Result   Satisfactory postoperative  appearance of the left knee.                    Assessment/Plan   Active Hospital Problems (** Indicates Principal Problem)    Diagnosis Date Noted   • Pain due to internal orthopedic prosthetic devices, implants and grafts, initial encounter [T84.84XA] 12/12/2017      Resolved Hospital Problems    Diagnosis Date Noted Date Resolved   No resolved problems to display.     - HTN: Will resume her home dose of exforge and stop hctz. Monitor BP. With her recent weight loss again, she may need additional titration of medications. Will monitor while she remains inpatient and then she should have follow up with PCP.  - sp L TKA revision: Per primary service    Thank you for the consult and allowing me to participate in Ms Ace's care. Will continue to follow. Please call with any questions or concerns.    I discussed the patients findings and my recommendations with patient.    Julio César Rosenbaum MD  [facility]  12/13/17  10:22 AM

## 2017-12-13 NOTE — OP NOTE
OPERATIVE REPORT      Facility: [facility]    Patient name: Sophie Ace  : 1965        Medical record: [id number]    Date of procedure: 2017    Pre-operative diagnosis: Left knee failed unicompartmental [place]    Post-operative diagnosis: Same    Procedure performed: Left revision total knee arthroplasty; Reefing of MCL    Implants: Biomet Vanguard total knee system   Femur - 62.5   Augments - 0  Stem - n/a   Tibial tray - 71  Augments - 0  Stem - na   Patella - 31   Bearing - 16    Surgeon:Bhanu Baxter M.D.     Anesthesia: General endotracheal anesthesia plus supplemental regional block    Estimated blood loss: 200  milliliters     Drains: 10 [place] Hemovac    Specimens: None    Complications: None apparent      Indications: Sophie Ace is a pleasant 52 y.o. year old who presented to my office with a history of persistent pain after unicompartmental knee replacement.  This has been evaluated, and she presents today for revision to TKA. We discussed the risks of the procedure which include, but are not limited to, infection, DVT, PE, damage to nerves or blood vessels at the time of surgery, bleeding and blood loss, stiffness/arthrofibrosis, and risk of loosening or failure of the components requiring revision surgery. The patient expressed understanding and wished to proceed with the surgery. An informed consent form was signed and placed on the chart prior to coming to the Operating Room.       Description of Procedure: The patient was identified in the preop holding area and the left knee was marked as the correct operative site. The patient was given a dose of perioperative antibiotics and then brought to the Operating Room and placed supine on the operating table. After adequate anesthesia was obtained, a tourniquet was placed on the operative thigh. The operative extremity was then prepped and draped in standard sterile fashion. A surgical time out was performed confirming  that the left knee was the correct operative site.     The operative limb was exsanguinated and the tourniquet was inflated to 250mm Hg. A longitudinal incision was made through the previous well healed incision, and extended proximally and distally as needed.  Dissection was carried down to the level of the knee joint capsule. A medial parapatellar quad splitting incision was made in the capsule and the  patella was then reflected and the knee flexed.     The patella was re-surfaced using the oscillating saw, removing 8mm of bone, The clamp was utilized, and three holes were drilled for a 28/31mm patella.  Cement was prepared and the patella clamped into position, excess cement was then removed.    The soft tissue was inspected and there was evidence of moderate synovitis.  There was no overt evidence of infection.  The tibial implants were then removed using the flexible osteotomes, straight osteotomes, and extraction tool minimal bone loss was noted all excess cement was retrieved.     A reamer was used to create a hole in the canal of the femur. An intra-medullary guide hema was placed with a 7 degrees valgus wing for alignment of the distal femoral cut. The femoral component was utilized for referencing, and subsequently removed with osteotomes. The distal femoral clean up cut was performed with a saw using the 2mm resection line. The femur was then sized with the AP sizing guide and measured at 62.5mm. The matching 4-in-1 cut block was then placed in the femur, and the anterior, posterior, and chamfer cuts were created. There was no anterior femoral notching noted. A trial femoral component was placed in position confirming the cuts and joint line placement.    Attention was then turned to the tibia. An intramedullary guide was placed in the tibia, and the cutting block pinned into position. The posterior, medial, and lateral structures were protected using retractors while the saw cut was performed. The tibia  was then sized with the sizing guides, and measured at 71mm.  A 10-millimeter spacer block fit comfortably in both flexion and extension after the cut.     The trial components were placed on the femur, tibia, and patella. The knee was taken through a full range of motion. There was evidence of MCL laxity, but the ligament was palpably intact. There was good tracking of the patella. The external rotation of the tibia was marked in line with the second metatarsal and the tibia was then punched.     The permanent components were opened on the back table and the tibia baseplate and screws inserted. The femoral component was impacted into position with excellent stability. The knee was taken through a full range of motion with different trial spacers and the size that gave the best fit and allowed full extension was selected, which measured 16mm. The permanent polyethylene component was opened and placed into the tibial tray. It was fixed in place with a locking bar. A final range of motion of the knee was performed.     The MCL was then plicated with multiple 2-0 Vicryl sutures, and the wound was then thoroughly irrigated and any excess or fragmented cement was removed. Hemostasis was obtained using an electrocautery device. A 10 [place] Hemovac drain was placed in the joint and brought out the lateral skin of the thigh.     Attention was then turned to wound closure. The arthrotomy was closed using 0 V-lock suture. The subcutaneous tissue was closed with 2-0 Vicryl suture.  The skin was closed with staples.  Sterile dressings were applied consisting of 4 x 4's, ABDs, sterile cast padding, and sterile ACE wrap. The patient's knee was placed in a cold pack. The patient was then awakened from anesthesia and transferred to the Recovery Room in stable condition.

## 2017-12-14 PROCEDURE — 97110 THERAPEUTIC EXERCISES: CPT

## 2017-12-14 PROCEDURE — 97150 GROUP THERAPEUTIC PROCEDURES: CPT

## 2017-12-14 RX ORDER — ALUMINA, MAGNESIA, AND SIMETHICONE 2400; 2400; 240 MG/30ML; MG/30ML; MG/30ML
15 SUSPENSION ORAL EVERY 6 HOURS PRN
Status: DISCONTINUED | OUTPATIENT
Start: 2017-12-14 | End: 2017-12-16 | Stop reason: HOSPADM

## 2017-12-14 RX ORDER — AMLODIPINE BESYLATE 5 MG/1
5 TABLET ORAL
Status: DISCONTINUED | OUTPATIENT
Start: 2017-12-14 | End: 2017-12-14

## 2017-12-14 RX ORDER — OXYCODONE AND ACETAMINOPHEN 10; 325 MG/1; MG/1
2 TABLET ORAL EVERY 4 HOURS PRN
Qty: 80 TABLET | Refills: 0 | Status: SHIPPED | OUTPATIENT
Start: 2017-12-14 | End: 2017-12-23

## 2017-12-14 RX ORDER — VALSARTAN 160 MG/1
160 TABLET ORAL
Status: DISCONTINUED | OUTPATIENT
Start: 2017-12-14 | End: 2017-12-14

## 2017-12-14 RX ORDER — OXYCODONE AND ACETAMINOPHEN 10; 325 MG/1; MG/1
2 TABLET ORAL EVERY 4 HOURS PRN
Qty: 80 TABLET | Refills: 0 | Status: SHIPPED | OUTPATIENT
Start: 2017-12-14 | End: 2017-12-14

## 2017-12-14 RX ADMIN — APIXABAN 2.5 MG: 2.5 TABLET, FILM COATED ORAL at 08:54

## 2017-12-14 RX ADMIN — OXYCODONE HYDROCHLORIDE AND ACETAMINOPHEN 2 TABLET: 10; 325 TABLET ORAL at 16:33

## 2017-12-14 RX ADMIN — PANTOPRAZOLE SODIUM 40 MG: 40 TABLET, DELAYED RELEASE ORAL at 06:52

## 2017-12-14 RX ADMIN — APIXABAN 2.5 MG: 2.5 TABLET, FILM COATED ORAL at 20:45

## 2017-12-14 RX ADMIN — DOCUSATE SODIUM -SENNOSIDES 2 TABLET: 50; 8.6 TABLET, COATED ORAL at 20:44

## 2017-12-14 RX ADMIN — OXYCODONE HYDROCHLORIDE AND ACETAMINOPHEN 2 TABLET: 10; 325 TABLET ORAL at 06:53

## 2017-12-14 RX ADMIN — OXYCODONE HYDROCHLORIDE AND ACETAMINOPHEN 2 TABLET: 10; 325 TABLET ORAL at 20:45

## 2017-12-14 RX ADMIN — DOCUSATE SODIUM -SENNOSIDES 2 TABLET: 50; 8.6 TABLET, COATED ORAL at 16:34

## 2017-12-14 RX ADMIN — DULOXETINE HYDROCHLORIDE 120 MG: 60 CAPSULE, DELAYED RELEASE ORAL at 08:53

## 2017-12-14 RX ADMIN — OXYCODONE HYDROCHLORIDE AND ACETAMINOPHEN 2 TABLET: 10; 325 TABLET ORAL at 02:48

## 2017-12-14 RX ADMIN — FLUTICASONE PROPIONATE 2 SPRAY: 50 SPRAY, METERED NASAL at 06:53

## 2017-12-14 RX ADMIN — VITAMIN E CAP 400 UNIT 400 UNITS: 400 CAP at 06:53

## 2017-12-14 RX ADMIN — OXYCODONE HYDROCHLORIDE AND ACETAMINOPHEN 2 TABLET: 10; 325 TABLET ORAL at 12:32

## 2017-12-14 RX ADMIN — ALUMINUM HYDROXIDE, MAGNESIUM HYDROXIDE, AND DIMETHICONE 15 ML: 400; 400; 40 SUSPENSION ORAL at 21:21

## 2017-12-14 RX ADMIN — DOCUSATE SODIUM -SENNOSIDES 2 TABLET: 50; 8.6 TABLET, COATED ORAL at 08:53

## 2017-12-14 RX ADMIN — FLUOXETINE HYDROCHLORIDE 20 MG: 20 CAPSULE ORAL at 06:53

## 2017-12-14 NOTE — PROGRESS NOTES
Name: Sophie Ace ADMIT: 2017   : 1965  PCP: Kim Barr MD    MRN: [id number] LOS: 2 days   AGE/SEX: 52 y.o. female  ROOM: [facility]   Subjective   Subjective  No symptoms of hypotension (dizziness, vision changes, palpitations). Chronic fatigue present. No CP SOA NVD.    Objective   Vital Signs  Temp:  [97.4 °F (36.3 °C)-99.2 °F (37.3 °C)] 98.3 °F (36.8 °C)  Heart Rate:  [74-82] 80  Resp:  [16] 16  BP: ()/(58-67) 93/60  SpO2:  [98 %-100 %] 98 %  on   ;   O2 Device: CPAP  Body mass index is 42.89 kg/(m^2).    Physical Exam   Constitutional: She appears well-developed and well-nourished. No distress.   HENT:   Head: Normocephalic and atraumatic.   Eyes: EOM are normal. Pupils are equal, round, and reactive to light.   Neck: Normal range of motion. Neck supple.   Cardiovascular: Normal rate, regular rhythm and intact distal pulses.    No murmur heard.  Pulmonary/Chest: Effort normal and breath sounds normal. She has no wheezes.   Abdominal: Soft. Bowel sounds are normal. She exhibits no distension. There is no tenderness.   Musculoskeletal: She exhibits no edema.   LLE dressed   Neurological: She is alert. No cranial nerve deficit.   Skin: Skin is warm and dry. She is not diaphoretic.   Psychiatric: She has a normal mood and affect. Her behavior is normal.   Nursing note and vitals reviewed.      Results Review:       I reviewed the patient's new clinical results. Reviewed imaging, agree with interpretation.     Results from last 7 days  Lab Units 17  0357 17  1256   WBC 10*3/mm3 7.61 4.49*   HEMOGLOBIN g/dL 10.3* 12.9   PLATELETS 10*3/mm3 152 221     Results from last 7 days  Lab Units 17  1256   SODIUM mmol/L 142   POTASSIUM mmol/L 3.5   CHLORIDE mmol/L 102   CO2 mmol/L 24.7   BUN mg/dL 11   CREATININE mg/dL 0.62   GLUCOSE mg/dL 100*   Estimated Creatinine Clearance: 129.2 mL/min (by C-G formula based on Cr of 0.62).  Results from last 7 days  Lab Units 17  7004    CALCIUM mg/dL 9.2         amLODIPine 5 mg Oral Q24H   apixaban 2.5 mg Oral Q12H   DULoxetine 120 mg Oral Daily   FLUoxetine 20 mg Oral QAM   fluticasone 2 spray Each Nare QAM   pantoprazole 40 mg Oral QAM   sennosides-docusate sodium 2 tablet Oral BID   valsartan 160 mg Oral Q24H   vitamin E 400 Units Oral QAM      Diet Regular      Assessment/Plan   Active Hospital Problems (** Indicates Principal Problem)    Diagnosis Date Noted   • Pain due to internal orthopedic prosthetic devices, implants and grafts, initial encounter [T84.84XA] 12/12/2017      Resolved Hospital Problems    Diagnosis Date Noted Date Resolved   No resolved problems to display.     - HTN: BP still mildly low. Discussed with nursing and she did not receive medication yesterday. Will stop antihypertensives and monitor.  - Chronic fatigue: Likely related to her chronic pain and opiate therapy. Discussed that low BP can contribute as well.  - L TKA revision    Will continue to follow. Please call with any questions or concerns.    Julio César Rosenbaum MD  [facility]  12/14/17  9:54 AM

## 2017-12-14 NOTE — PLAN OF CARE
Problem: Patient Care Overview (Adult)  Goal: Plan of Care Review  Outcome: Ongoing (interventions implemented as appropriate)    12/14/17 0329   Coping/Psychosocial Response Interventions   Plan Of Care Reviewed With patient   Patient Care Overview   Progress progress toward functional goals as expected   Outcome Evaluation   Outcome Summary/Follow up Plan Vitals as charted, c/o pain controlled w/ PRN percocet, educated pt on monitoring BP and medication r/t h/o HTN, will continue to monitor.        Goal: Adult Individualization and Mutuality  Outcome: Ongoing (interventions implemented as appropriate)  Goal: Discharge Needs Assessment  Outcome: Ongoing (interventions implemented as appropriate)    Problem: Knee Replacement, Total (Adult)  Goal: Signs and Symptoms of Listed Potential Problems Will be Absent or Manageable (Knee Replacement, Total)  Outcome: Ongoing (interventions implemented as appropriate)    Problem: Fall Risk (Adult)  Goal: Absence of Falls  Outcome: Ongoing (interventions implemented as appropriate)

## 2017-12-14 NOTE — PLAN OF CARE
Problem: Patient Care Overview (Adult)  Goal: Plan of Care Review  Outcome: Ongoing (interventions implemented as appropriate)    12/14/17 0375   Coping/Psychosocial Response Interventions   Plan Of Care Reviewed With patient   Patient Care Overview   Progress progress toward functional goals is gradual   Outcome Evaluation   Outcome Summary/Follow up Plan more assist req today due to pain and fatigue

## 2017-12-14 NOTE — THERAPY TREATMENT NOTE
Acute Care - Physical Therapy Treatment Note  [facility]     Patient Name: Sophie Ace  : 1965  MRN: [id number]  Today's Date: 2017  Onset of Illness/Injury or Date of Surgery Date: 17  Date of Referral to PT: 17  Referring Physician: Vee    Admit Date: 2017    Visit Dx:    ICD-10-CM ICD-9-CM   1. Pain due to internal orthopedic prosthetic devices, implants and grafts, initial encounter T84.84XA 996.78     338.18   2. Impaired gait and mobility R26.89 781.2     Patient Active Problem List   Diagnosis   • Pain due to internal orthopedic prosthetic devices, implants and grafts, initial encounter               Adult Rehabilitation Note       17 1000 17 1345       Rehab Assessment/Intervention    Discipline physical therapy assistant  - physical therapist  -AR     Document Type therapy note (daily note)  - therapy note (daily note)  -AR     Subjective Information agree to therapy;complains of;weakness;fatigue;pain;swelling  - agree to therapy  -AR     Patient Effort, Rehab Treatment  good  -AR     Precautions/Limitations fall precautions;brace on when up   clarifying w/MD about brace being worn at all times  - fall precautions  -AR     Specific Treatment Considerations hinged knee brace to support MCL laxity  -      Recorded by [MARY ANNE] Ilda Guzman PTA [AR] Dee Bradley, PT     Pain Assessment    Pain Assessment 0-10  - 0-10  -AR     Pain Score 7  -JM 8  -AR     Post Pain Score 9  -JM      Pain Type Surgical pain  - Surgical pain  -AR     Pain Location Knee  - Knee  -AR     Pain Orientation Left  - Left  -AR     Pain Intervention(s) Medication (See MAR);Repositioned   nsg to get fresh ice pack after dressing change  - Repositioned;Cold applied;Medication (See MAR)  -AR     Response to Interventions genesis, unchanged  -      Recorded by [MARY ANNE] Ilda Guzman PTA [AR] Dee Bradley, PT     Vision Assessment/Intervention    Visual Impairment  WNL   -AR     Recorded by  [AR] Dee Bradley PT     Cognitive Assessment/Intervention    Current Cognitive/Communication Assessment  functional  -AR     Orientation Status  oriented x 4  -AR     Recorded by  [AR] Dee Bradley PT     ROM (Range of Motion)    General ROM Detail will measure in pm   will check w/MD, ROM order but locked at 60'  - -10-64  -AR     Recorded by [JM] Ilda Guzman PTA [AR] Dee Bradley PT     Bed Mobility, Assessment/Treatment    Bed Mob, Supine to Sit, [place]  not tested  -AR     Bed Mob, Sit to Supine, [place] minimum assist (75% patient effort);moderate assist (50% patient effort)  -JM not tested  -AR     Recorded by [MARY ANNE] Ilda Guzman PTA [AR] Dee Bradley PT     Transfer Assessment/Treatment    Transfers, Sit-Stand [place] stand by assist;verbal cues required  - conditional independence  -AR     Transfers, Stand-Sit [place] stand by assist;verbal cues required  - conditional independence  -AR     Transfers, Sit-Stand-Sit, Assist Device standard walker  - standard walker  -AR     Recorded by [JM] Ilda Guzman PTA [AR] Dee Bradley PT     Gait Assessment/Treatment    Gait, [place] Level stand by assist  - stand by assist  -AR     Gait, Assistive Device standard walker  - standard walker  -AR     Gait, Distance (Feet) 45  -JM 40  -AR     Gait, Gait Pattern Analysis swing-to gait  - swing-to gait  -AR     Gait, Gait Deviations antalgic;yessica decreased;step length decreased  - antalgic;yessica decreased;decreased heel strike  -AR     Gait, Safety Issues step length decreased   pt unable to step into wx due to size of wx  - step length decreased  -AR     Gait, Impairments strength decreased;ROM decreased  - strength decreased;ROM decreased  -AR     Gait, Comment pain and fatigue limiting  - limited by pain  -AR     Recorded by [JM] Ilda Guzman PTA [AR] Dee Bradley PT     Stairs Assessment/Treatment    Stairs,  Comment try in pm if pn allows  -JM pt declined 2/2 pain  -AR     Recorded by [MARY ANNE] Ilda Guzman PTA [AR] Dee Bradley PT     Therapy Exercises    Exercise Protocols total knee  - total knee  -AR     Total Knee Exercises left:;20 reps;with assist  - left:;15 reps;completed protocol  -AR     Recorded by [MARY ANNE] Ilda Guzman PTA [AR] Dee Bradley PT     Positioning and Restraints    Pre-Treatment Position sitting in chair/recliner  - sitting in chair/recliner  -AR     Post Treatment Position bed  - chair  -AR     In Bed supine;call light within reach;encouraged to call for assist  -JM      In Chair  sitting;reclined;call light within reach;encouraged to call for assist;exit alarm on  -AR     Recorded by [MARY ANNE] Ilda Guzman PTA [AR] Dee Bradley PT       User Key  (r) = Recorded By, (t) = Taken By, (c) = Cosigned By    Initials Name Effective Dates    MARY ANNE Guzman, JOSELO 02/18/16 -     AR Dee Bradley PT 06/27/16 -                 IP PT Goals       12/13/17 0919          Gait Training PT LTG    Gait Training Goal PT LTG, Date Established 12/13/17  -      Gait Training Goal PT LTG, Time to Achieve 3 days  -      Gait Training Goal PT LTG, [place] Level conditional independence  -      Gait Training Goal PT LTG, Assist Device walker, rolling  -      Gait Training Goal PT LTG, Distance to Achieve 150  -LH      Stair Training PT LTG    Stair Training Goal PT LTG, Date Established 12/13/17  -      Stair Training Goal PT LTG, Time to Achieve 3 days  -LH      Stair Training Goal PT LTG, Number of Steps 4  -      Stair Training Goal PT LTG, [place] Level contact guard assist  -      Stair Training Goal PT LTG, Assist Device 2 handrails  -      Range of Motion PT LTG    Range of Motion Goal PT LTG, Date Established 12/13/17  -LH      Range of Motion Goal PT LTG, Time to Achieve 3 days  -LH      Range fo Motion Goal PT LTG, Joint L knee  -      Range of Motion Goal PT  LTG, AROM Measure 0-90  -        User Key  (r) = Recorded By, (t) = Taken By, (c) = Cosigned By    Initials Name Provider Type     Ashley Miller, PT Physical Therapist          Physical Therapy Education     Title: PT OT SLP Therapies (Done)     Topic: Physical Therapy (Done)     Point: Mobility training (Done)    Learning Progress Summary    Learner Readiness Method Response Comment Documented by Status   Patient Acceptance E,TB,D VU   12/14/17 1545 Done    Acceptance E,TB VU,DU   12/13/17 1808 Done    Acceptance E VU,NR   12/13/17 0918 Done               Point: Home exercise program (Done)    Learning Progress Summary    Learner Readiness Method Response Comment Documented by Status   Patient Acceptance E,TB,D VU   12/14/17 1545 Done    Acceptance E,TB VU,[facility] 12/13/17 1808 Done    Acceptance E VU,[facility] 12/13/17 0918 Done               Point: Body mechanics (Done)    Learning Progress Summary    Learner Readiness Method Response Comment Documented by Status   Patient Acceptance E,TB,D VU   12/14/17 1545 Done    Acceptance E,TB VU,DU   12/13/17 1808 Done    Acceptance E VU,[facility] 12/13/17 0918 Done               Point: Precautions (Done)    Learning Progress Summary    Learner Readiness Method Response Comment Documented by Status   Patient Acceptance E,TB,D VU   12/14/17 1545 Done    Acceptance E,TB VU,[facility] 12/13/17 1808 Done    Acceptance E VU,[facility] 12/13/17 0918 Done                      User Key     Initials Effective Dates Name Provider Type Discipline     02/07/17 -  Ashley Miller, PT Physical Therapist PT     02/18/16 -  Ilda Guzman PTA Physical Therapy Assistant [facility] 10/13/17 -  Aissatou Medina RN Registered Nurse Nurse                    PT Recommendation and Plan  Anticipated Discharge Disposition: home with home health  Planned Therapy Interventions: balance training, bed mobility training, gait training, home exercise program, ROM (Range of Motion), stair training,  strengthening, stretching, transfer training  PT Frequency: 2 times/day  Plan of Care Review  Plan Of Care Reviewed With: patient  Progress: progress toward functional goals is gradual  Outcome Summary/Follow up Plan: more assist req today due to pain and fatigue          Outcome Measures       12/14/17 1500 12/13/17 1600 12/13/17 0900    How much help from another person do you currently need...    Turning from your back to your side while in flat bed without using bedrails? 3  - 4  -AR 4  -LH    Moving from lying on back to sitting on the side of a flat bed without bedrails? 3  - 4  -AR 4  -LH    Moving to and from a bed to a chair (including a wheelchair)? 3  - 4  -AR 4  -LH    Standing up from a chair using your arms (e.g., wheelchair, bedside chair)? 3  - 4  -AR 4  -LH    Climbing 3-5 steps with a railing? 1  - 3  -AR 3  -LH    To walk in hospital room? 3  - 3  -AR 3  -LH    AM-PAC 6 Clicks Score 16  - 22  -AR 22  -LH    Functional Assessment    Outcome Measure Options   AM-PAC 6 Clicks Basic Mobility (PT)  -      User Key  (r) = Recorded By, (t) = Taken By, (c) = Cosigned By    Initials Name Provider Type     Ashley Miller, PT Physical Therapist    MARY ANNE Guzman PTA Physical Therapy Assistant    MARK Bradley, PT Physical Therapist           Time Calculation:         PT Charges       12/14/17 1546          Time Calculation    Start Time 0930  -      Stop Time 1030  -      Time Calculation (min) 60 min  -      PT Received On 12/14/17  -      PT - Next Appointment 12/14/17  -        User Key  (r) = Recorded By, (t) = Taken By, (c) = Cosigned By    Initials Name Provider Type    MARY ANNE Guzman PTA Physical Therapy Assistant          Therapy Charges for Today     Code Description Service Date Service Provider Modifiers Qty    51726427064 HC PT THER PROC EA 15 MIN 12/14/2017 Ilda Guzman PTA GP 2    64283352601 HC PT THER PROC GROUP 12/14/2017 Ilda Guzman PTA GP 1           PT G-Codes  Outcome Measure Options: AM-PAC 6 Clicks Basic Mobility (PT)    Ilda Gumzan, PTA  12/14/2017

## 2017-12-14 NOTE — PLAN OF CARE
Problem: Patient Care Overview (Adult)  Goal: Plan of Care Review  Outcome: Ongoing (interventions implemented as appropriate)    12/14/17 4980   Coping/Psychosocial Response Interventions   Plan Of Care Reviewed With patient   Patient Care Overview   Progress improving   Outcome Evaluation   Outcome Summary/Follow up Plan Pt up to chair today, drain pulled out while ambulating to bathroom, surgical dressing changed and telfa applied, BP meds dc'd d/t hypotention        Goal: Adult Individualization and Mutuality  Outcome: Ongoing (interventions implemented as appropriate)    Problem: Perioperative Period (Adult)  Goal: Signs and Symptoms of Listed Potential Problems Will be Absent or Manageable (Perioperative Period)  Outcome: Ongoing (interventions implemented as appropriate)    Problem: Knee Replacement, Total (Adult)  Goal: Signs and Symptoms of Listed Potential Problems Will be Absent or Manageable (Knee Replacement, Total)  Outcome: Ongoing (interventions implemented as appropriate)    Problem: Fall Risk (Adult)  Goal: Absence of Falls  Outcome: Ongoing (interventions implemented as appropriate)

## 2017-12-14 NOTE — THERAPY TREATMENT NOTE
Acute Care - Physical Therapy Treatment Note  [facility]     Patient Name: Sophie Ace  : 1965  MRN: [id number]  Today's Date: 2017  Onset of Illness/Injury or Date of Surgery Date: 17  Date of Referral to PT: 17  Referring Physician: Vee    Admit Date: 2017    Visit Dx:    ICD-10-CM ICD-9-CM   1. Pain due to internal orthopedic prosthetic devices, implants and grafts, initial encounter T84.84XA 996.78     338.18   2. Impaired gait and mobility R26.89 781.2     Patient Active Problem List   Diagnosis   • Pain due to internal orthopedic prosthetic devices, implants and grafts, initial encounter               Adult Rehabilitation Note       17 1500 17 1000 17 1345    Rehab Assessment/Intervention    Discipline physical therapy assistant  - physical therapy assistant  - physical therapist  -AR    Document Type therapy note (daily note)  - therapy note (daily note)  - therapy note (daily note)  -AR    Subjective Information agree to therapy;complains of;weakness;fatigue;pain;swelling  - agree to therapy;complains of;weakness;fatigue;pain;swelling  - agree to therapy  -AR    Patient Effort, Rehab Treatment   good  -AR    Precautions/Limitations fall precautions;brace on when up  - fall precautions;brace on when up   clarifying w/MD about brace being worn at all times  - fall precautions  -AR    Specific Treatment Considerations hinged knee brace on until MD clarifies  - hinged knee brace to support MCL laxity  -     Recorded by [JM] Ilda Guzman PTA [JM] Ilda Guzman PTA [AR] Dee Bradley, PT    Pain Assessment    Pain Assessment 0-10  -JM 0-10  -JM 0-10  -AR    Pain Score 7  -JM 7  -JM 8  -AR    Post Pain Score  9  -JM     Pain Type Surgical pain  -JM Surgical pain  -JM Surgical pain  -AR    Pain Location Knee  -JM Knee  -JM Knee  -AR    Pain Orientation Left  -JM Left  -JM Left  -AR    Pain Intervention(s) Medication (See  MAR);Repositioned;Cold applied  -JM Medication (See MAR);Repositioned   nsg to get fresh ice pack after dressing change  - Repositioned;Cold applied;Medication (See MAR)  -AR    Response to Interventions genesis  -JM genesis, unchanged  -JM     Recorded by [JM] Ilda Guzman PTA [JM] Ilda Guzman PTA [AR] Dee Bradley, PT    Vision Assessment/Intervention    Visual Impairment   WNL  -AR    Recorded by   [AR] Dee Bradley PT    Cognitive Assessment/Intervention    Current Cognitive/Communication Assessment   functional  -AR    Orientation Status   oriented x 4  -AR    Recorded by   [AR] Dee Bradley, PT    ROM (Range of Motion)    General ROM Detail -15-72  -JM will measure in pm   will check w/MD, ROM order but locked at 60'  -JM -10-64  -AR    Recorded by [MARY ANNE] Ilda Guzman PTA [JM] Ilda Guzman PTA [AR] Dee Bradley, PT    Bed Mobility, Assessment/Treatment    Bed Mob, Supine to Sit, [place]   not tested  -AR    Bed Mob, Sit to Supine, [place]  minimum assist (75% patient effort);moderate assist (50% patient effort)  -JM not tested  -AR    Recorded by  [MARY ANNE] Ilda Guzman PTA [AR] Dee Bradley, PT    Transfer Assessment/Treatment    Transfers, Sit-Stand [place] verbal cues required;minimum assist (75% patient effort);contact guard assist  - stand by assist;verbal cues required  - conditional independence  -AR    Transfers, Stand-Sit [place] stand by assist;verbal cues required  - stand by assist;verbal cues required  - conditional independence  -AR    Transfers, Sit-Stand-Sit, Assist Device rolling walker  - standard walker  - standard walker  -AR    Recorded by [JM] Ilda Guzman PTA [JM] Ilda Guzman PTA [AR] Dee Bradley, PT    Gait Assessment/Treatment    Gait, [place] Level stand by assist  - stand by assist  - stand by assist  -AR    Gait, Assistive Device standard walker  - standard walker  - standard walker  -AR    Gait,  Distance (Feet) 80  -JM 45  -JM 40  -AR    Gait, Gait Pattern Analysis swing-to gait  -JM swing-to gait  -JM swing-to gait  -AR    Gait, Gait Deviations  antalgic;yessica decreased;step length decreased  -JM antalgic;yessica decreased;decreased heel strike  -AR    Gait, Safety Issues  step length decreased   pt unable to step into wx due to size of wx  -JM step length decreased  -AR    Gait, Impairments strength decreased;ROM decreased  -JM strength decreased;ROM decreased  -JM strength decreased;ROM decreased  -AR    Gait, Comment 3 standing rests due to wkness, UE fatigue  -JM pain and fatigue limiting  -JM limited by pain  -AR    Recorded by [JM] Ilda Guzman PTA [JM] Ilda Guzman PTA [AR] Dee Bradley PT    Stairs Assessment/Treatment    Stairs, Comment pt and fam want to clarify w/MD about brace prior to trying stairs   pt/fam concerned that all bedrm/BR up steep flight steps?SNU  -JM try in pm if pn allows  -JM pt declined 2/2 pain  -AR    Recorded by [JM] Ilda Guzman PTA [JM] Ilda Guzman PTA [AR] Dee Bradley, PT    Therapy Exercises    Exercise Protocols total knee  -JM total knee  -JM total knee  -AR    Total Knee Exercises left:;with assist;25 reps  -JM left:;20 reps;with assist  - left:;15 reps;completed protocol  -AR    Recorded by [JM] Ilda Guzman PTA [JM] Ilda Guzman PTA [AR] Dee Bradley, PT    Positioning and Restraints    Pre-Treatment Position sitting in chair/recliner  -JM sitting in chair/recliner  -JM sitting in chair/recliner  -AR    Post Treatment Position  bed  - chair  -AR    In Bed  supine;call light within reach;encouraged to call for assist  -JM     In Chair reclined;call light within reach;encouraged to call for assist;with family/caregiver;with brace  -JM  sitting;reclined;call light within reach;encouraged to call for assist;exit alarm on  -AR    Recorded by [MARY ANNE] Ilda Guzman PTA [JM] Ilda Guzman PTA [AR] Dee Bradley, PT      User  Key  (r) = Recorded By, (t) = Taken By, (c) = Cosigned By    Initials Name Effective Dates     Ilda Guzman, PTA 02/18/16 -     AR Dee Bradley, PT 06/27/16 -                 IP PT Goals       12/13/17 0919          Gait Training PT LTG    Gait Training Goal PT LTG, Date Established 12/13/17  -      Gait Training Goal PT LTG, Time to Achieve 3 days  -LH      Gait Training Goal PT LTG, [place] Level conditional independence  -LH      Gait Training Goal PT LTG, Assist Device walker, rolling  -LH      Gait Training Goal PT LTG, Distance to Achieve 150  -LH      Stair Training PT LTG    Stair Training Goal PT LTG, Date Established 12/13/17  -      Stair Training Goal PT LTG, Time to Achieve 3 days  -LH      Stair Training Goal PT LTG, Number of Steps 4  -LH      Stair Training Goal PT LTG, [place] Level contact guard assist  -      Stair Training Goal PT LTG, Assist Device 2 handrails  -LH      Range of Motion PT LTG    Range of Motion Goal PT LTG, Date Established 12/13/17  -      Range of Motion Goal PT LTG, Time to Achieve 3 days  -LH      Range fo Motion Goal PT LTG, Joint L knee  -      Range of Motion Goal PT LTG, AROM Measure 0-90  -        User Key  (r) = Recorded By, (t) = Taken By, (c) = Cosigned By    Initials Name Provider Type     Ashley Miller, PT Physical Therapist          Physical Therapy Education     Title: PT OT SLP Therapies (Done)     Topic: Physical Therapy (Done)     Point: Mobility training (Done)    Learning Progress Summary    Learner Readiness Method Response Comment Documented by Status   Patient Acceptance E,LAWRENCE,SIDDHARTH RAMIREZ   12/14/17 1545 Done    Acceptance ELAWRENCE DU ST 12/13/17 1808 Done    Acceptance E TIFFANIE RAMIREZ   12/13/17 0918 Done               Point: Home exercise program (Done)    Learning Progress Summary    Learner Readiness Method Response Comment Documented by Status   Patient Acceptance E,SIDDHARTH BRAVO   12/14/17 2165 Done    Acceptance E,EM VAZQUEZ  12/13/17 1808 Done    Acceptance E VU,NR   12/13/17 0918 Done               Point: Body mechanics (Done)    Learning Progress Summary    Learner Readiness Method Response Comment Documented by Status   Patient Acceptance E,TB,D VU   12/14/17 1545 Done    Acceptance E,TB VU,DU   12/13/17 1808 Done    Acceptance E VU,NR   12/13/17 0918 Done               Point: Precautions (Done)    Learning Progress Summary    Learner Readiness Method Response Comment Documented by Status   Patient Acceptance E,TB,D VU   12/14/17 1545 Done    Acceptance E,TB VU,DU   12/13/17 1808 Done    Acceptance E VU,NR   12/13/17 0918 Done                      User Key     Initials Effective Dates Name Provider Type Discipline     02/07/17 -  Ashley Miller, PT Physical Therapist PT     02/18/16 -  Ilda Guzman PTA Physical Therapy Assistant PT     10/13/17 -  Aissatou Medina RN Registered Nurse Nurse                    PT Recommendation and Plan  Anticipated Discharge Disposition: home with home health  Planned Therapy Interventions: balance training, bed mobility training, gait training, home exercise program, ROM (Range of Motion), stair training, strengthening, stretching, transfer training  PT Frequency: 2 times/day  Plan of Care Review  Plan Of Care Reviewed With: patient  Progress: progress toward functional goals is gradual  Outcome Summary/Follow up Plan: more assist req today due to pain and fatigue          Outcome Measures       12/14/17 1500 12/13/17 1600 12/13/17 0900    How much help from another person do you currently need...    Turning from your back to your side while in flat bed without using bedrails? 3  -JM 4  -AR 4  -LH    Moving from lying on back to sitting on the side of a flat bed without bedrails? 3  -JM 4  -AR 4  -LH    Moving to and from a bed to a chair (including a wheelchair)? 3  -JM 4  -AR 4  -LH    Standing up from a chair using your arms (e.g., wheelchair, bedside chair)? 3  -JM 4  -AR 4   -    Climbing 3-5 steps with a railing? 1  -MARY ANNE 3  -AR 3  -    To walk in hospital room? 3  -JM 3  -AR 3  -LH    AM-PAC 6 Clicks Score 16  -JM 22  -AR 22  -    Functional Assessment    Outcome Measure Options   AM-PAC 6 Clicks Basic Mobility (PT)  -      User Key  (r) = Recorded By, (t) = Taken By, (c) = Cosigned By    Initials Name Provider Type     Ashley Miller, PT Physical Therapist    MARY ANNE Guzman PTA Physical Therapy Assistant    MARK Bradley, PT Physical Therapist           Time Calculation:         PT Charges       12/14/17 1604 12/14/17 1546       Time Calculation    Start Time 1435  - 0930  -     Stop Time 1530  - 1030  -     Time Calculation (min) 55 min  - 60 min  -     PT Received On 12/14/17  - 12/14/17  -     PT - Next Appointment 12/15/17  - 12/14/17  -       User Key  (r) = Recorded By, (t) = Taken By, (c) = Cosigned By    Initials Name Provider Type    MARY ANNE Guzman PTA Physical Therapy Assistant          Therapy Charges for Today     Code Description Service Date Service Provider Modifiers Qty    31873839757 HC PT THER PROC EA 15 MIN 12/14/2017 Ilda Guzman PTA GP 2    99672109376 HC PT THER PROC GROUP 12/14/2017 Ilda Guzman PTA GP 1    00013268951 HC PT THER PROC EA 15 MIN 12/14/2017 Ilda Guzman PTA GP 2    17454776704 HC PT THER PROC GROUP 12/14/2017 Ilda Guzman PTA GP 1          PT G-Codes  Outcome Measure Options: AM-PAC 6 Clicks Basic Mobility (PT)    Ilda Guzman PTA  12/14/2017

## 2017-12-14 NOTE — PROGRESS NOTES
"Ortho POD 2    Patient Name:  Sophie Ace  YOB: 1965  Medical Records Number:  [id number]    No complaints    /65 (BP Location: Right arm, Patient Position: Lying)  Pulse 97  Temp 97.1 °F (36.2 °C) (Oral)   Resp 16  Ht 162.6 cm (64.02\")  Wt 113 kg (250 lb)  SpO2 96%  BMI 42.89 kg/m2    RLE:  NVI, calf nontender, sensation intact  No signs of DVT    Incision: clean, no infection    Lab Results (last 24 hours)     ** No results found for the last 24 hours. **          S/p Left TKA  WBAT with walker  Eliquis for DVT prophylaxis  Brace applied to LLE  "

## 2017-12-15 LAB
ANION GAP SERPL CALCULATED.3IONS-SCNC: 12.4 MMOL/L
BUN BLD-MCNC: 7 MG/DL (ref 6–20)
BUN/CREAT SERPL: 15.6 (ref 7–25)
CALCIUM SPEC-SCNC: 8.1 MG/DL (ref 8.6–10.5)
CHLORIDE SERPL-SCNC: 100 MMOL/L (ref 98–107)
CO2 SERPL-SCNC: 27.6 MMOL/L (ref 22–29)
CREAT BLD-MCNC: 0.45 MG/DL (ref 0.57–1)
GFR SERPL CREATININE-BSD FRML MDRD: 146 ML/MIN/1.73
GLUCOSE BLD-MCNC: 100 MG/DL (ref 65–99)
POTASSIUM BLD-SCNC: 3.2 MMOL/L (ref 3.5–5.2)
SODIUM BLD-SCNC: 140 MMOL/L (ref 136–145)

## 2017-12-15 PROCEDURE — 80048 BASIC METABOLIC PNL TOTAL CA: CPT | Performed by: INTERNAL MEDICINE

## 2017-12-15 PROCEDURE — 97150 GROUP THERAPEUTIC PROCEDURES: CPT

## 2017-12-15 PROCEDURE — 97110 THERAPEUTIC EXERCISES: CPT

## 2017-12-15 RX ORDER — POTASSIUM CHLORIDE 750 MG/1
40 CAPSULE, EXTENDED RELEASE ORAL EVERY 4 HOURS
Status: COMPLETED | OUTPATIENT
Start: 2017-12-15 | End: 2017-12-15

## 2017-12-15 RX ADMIN — OXYCODONE HYDROCHLORIDE AND ACETAMINOPHEN 2 TABLET: 10; 325 TABLET ORAL at 14:50

## 2017-12-15 RX ADMIN — FLUTICASONE PROPIONATE 2 SPRAY: 50 SPRAY, METERED NASAL at 08:43

## 2017-12-15 RX ADMIN — POTASSIUM CHLORIDE 40 MEQ: 750 CAPSULE, EXTENDED RELEASE ORAL at 14:50

## 2017-12-15 RX ADMIN — OXYCODONE HYDROCHLORIDE AND ACETAMINOPHEN 2 TABLET: 10; 325 TABLET ORAL at 23:18

## 2017-12-15 RX ADMIN — DULOXETINE HYDROCHLORIDE 120 MG: 60 CAPSULE, DELAYED RELEASE ORAL at 08:43

## 2017-12-15 RX ADMIN — DOCUSATE SODIUM -SENNOSIDES 2 TABLET: 50; 8.6 TABLET, COATED ORAL at 08:43

## 2017-12-15 RX ADMIN — FLUOXETINE HYDROCHLORIDE 20 MG: 20 CAPSULE ORAL at 08:43

## 2017-12-15 RX ADMIN — OXYCODONE HYDROCHLORIDE AND ACETAMINOPHEN 1 TABLET: 10; 325 TABLET ORAL at 18:53

## 2017-12-15 RX ADMIN — OXYCODONE HYDROCHLORIDE AND ACETAMINOPHEN 2 TABLET: 10; 325 TABLET ORAL at 06:40

## 2017-12-15 RX ADMIN — APIXABAN 2.5 MG: 2.5 TABLET, FILM COATED ORAL at 20:05

## 2017-12-15 RX ADMIN — OXYCODONE HYDROCHLORIDE AND ACETAMINOPHEN 2 TABLET: 10; 325 TABLET ORAL at 02:54

## 2017-12-15 RX ADMIN — VITAMIN E CAP 400 UNIT 400 UNITS: 400 CAP at 08:45

## 2017-12-15 RX ADMIN — OXYCODONE HYDROCHLORIDE AND ACETAMINOPHEN 2 TABLET: 10; 325 TABLET ORAL at 10:43

## 2017-12-15 RX ADMIN — PANTOPRAZOLE SODIUM 40 MG: 40 TABLET, DELAYED RELEASE ORAL at 06:40

## 2017-12-15 RX ADMIN — APIXABAN 2.5 MG: 2.5 TABLET, FILM COATED ORAL at 08:43

## 2017-12-15 RX ADMIN — POTASSIUM CHLORIDE 40 MEQ: 750 CAPSULE, EXTENDED RELEASE ORAL at 10:43

## 2017-12-15 RX ADMIN — DOCUSATE SODIUM -SENNOSIDES 2 TABLET: 50; 8.6 TABLET, COATED ORAL at 18:53

## 2017-12-15 NOTE — PLAN OF CARE
Problem: Patient Care Overview (Adult)  Goal: Plan of Care Review  Outcome: Ongoing (interventions implemented as appropriate)    12/15/17 5376   Coping/Psychosocial Response Interventions   Plan Of Care Reviewed With patient   Patient Care Overview   Progress improving   Outcome Evaluation   Outcome Summary/Follow up Plan VSS. Pain controlled with PO pain med. Ambulating with assist x1 and walker. Voiding without difficulty. Worked with PT today. Discussed BP monitoring r/t HTN. Possible d/c tomorrow with HH.          Problem: Perioperative Period (Adult)  Goal: Signs and Symptoms of Listed Potential Problems Will be Absent or Manageable (Perioperative Period)  Outcome: Ongoing (interventions implemented as appropriate)    Problem: Fall Risk (Adult)  Goal: Absence of Falls  Outcome: Ongoing (interventions implemented as appropriate)

## 2017-12-15 NOTE — PROGRESS NOTES
"Ortho POD 2    Patient Name:  Sophie Ace  YOB: 1965  Medical Records Number:  [id number]    No complaints except for pain, still requiring assist to walk + brace and walker    /78 (BP Location: Right arm, Patient Position: Lying)  Pulse 81  Temp 97.8 °F (36.6 °C) (Oral)   Resp 16  Ht 162.6 cm (64.02\")  Wt 113 kg (250 lb)  SpO2 100%  BMI 42.89 kg/m2    RLE:  NVI, calf nontender, sensation intact  No signs of DVT    Incision: clean, no infection    Lab Results (last 24 hours)     Procedure Component Value Units Date/Time    Basic Metabolic Panel [[id number]]  (Abnormal) Collected:  12/15/17 0356    Specimen:  Blood Updated:  12/15/17 0515     Glucose 100 (H) mg/dL      BUN 7 mg/dL      Creatinine 0.45 (L) mg/dL      Sodium 140 mmol/L      Potassium 3.2 (L) mmol/L      Chloride 100 mmol/L      CO2 27.6 mmol/L      Calcium 8.1 (L) mg/dL      eGFR Non African Amer 146 mL/min/1.73      BUN/Creatinine Ratio 15.6     Anion Gap 12.4 mmol/L     Narrative:       GFR Normal >60  Chronic Kidney Disease <60  Kidney Failure <15          S/p Left revisionTKA  WBAT with walker and hinged knee brace  Eliquis for DVT prophylaxis  Possible D/C in AM  "

## 2017-12-15 NOTE — THERAPY TREATMENT NOTE
Acute Care - Physical Therapy Treatment Note  [facility]     Patient Name: Sophie Ace  : 1965  MRN: [id number]  Today's Date: 12/15/2017  Onset of Illness/Injury or Date of Surgery Date: 17  Date of Referral to PT: 17  Referring Physician: Vee    Admit Date: 2017    Visit Dx:    ICD-10-CM ICD-9-CM   1. Pain due to internal orthopedic prosthetic devices, implants and grafts, initial encounter T84.84XA 996.78     338.18   2. Impaired gait and mobility R26.89 781.2     Patient Active Problem List   Diagnosis   • Pain due to internal orthopedic prosthetic devices, implants and grafts, initial encounter               Adult Rehabilitation Note       12/15/17 1632 17 1500 17 1000    Rehab Assessment/Intervention    Discipline physical therapist  -MS physical therapy assistant  - physical therapy assistant  -    Document Type therapy note (daily note)  -MS therapy note (daily note)  - therapy note (daily note)  -    Subjective Information agree to therapy;complains of;fatigue;pain  -MS agree to therapy;complains of;weakness;fatigue;pain;swelling  - agree to therapy;complains of;weakness;fatigue;pain;swelling  -    Patient Effort, Rehab Treatment good  -MS      Symptoms Noted During/After Treatment fatigue  -MS      Precautions/Limitations fall precautions;brace on when up  -MS fall precautions;brace on when up  -JM fall precautions;brace on when up   clarifying w/MD about brace being worn at all times  -    Specific Treatment Considerations  hinged knee brace on until MD clarifies  - hinged knee brace to support MCL laxity  -    Recorded by [MS] Fermin Soria, PT [JM] Ilda Guzman, PTA [JM] Ilda Guzman, PTA    Pain Assessment    Pain Assessment 0-10  -MS 0-10  -JM 0-10  -JM    Pain Score 5  -MS 7  -JM 7  -JM    Post Pain Score 5  -MS  9  -JM    Pain Type Acute pain;Surgical pain  -MS Surgical pain  -JM Surgical pain  -    Pain Location Knee  -MS  Knee  -JM Knee  -JM    Pain Orientation Left  -MS Left  -JM Left  -JM    Pain Intervention(s) Medication (See MAR);Cold applied;Repositioned;Elevated;Rest  -MS Medication (See MAR);Repositioned;Cold applied  -JM Medication (See MAR);Repositioned   nsg to get fresh ice pack after dressing change  -JM    Response to Interventions  genesis  -JM genesis, unchanged  -JM    Recorded by [MS] Fermin Soria PT [JM] Ilda Guzman PTA [JM] Ilda Guzman PTA    Cognitive Assessment/Intervention    Current Cognitive/Communication Assessment functional  -MS      Orientation Status oriented x 4  -MS      Follows Commands/Answers Questions 100% of the time  -MS      Personal Safety WNL/WFL  -MS      Personal Safety Interventions fall prevention program maintained;gait belt;nonskid shoes/slippers when out of bed;supervised activity  -MS      Recorded by [MS] Fermin Soria PT      ROM (Range of Motion)    General ROM Detail  -15-72  -JM will measure in pm   will check w/MD, ROM order but locked at 60'  -JM    Recorded by  [JM] Ilda Guzman PTA [JM] Ilda Guzman PTA    Bed Mobility, Assessment/Treatment    Bed Mob, Sit to Supine, [place]   minimum assist (75% patient effort);moderate assist (50% patient effort)  -    Bed Mobility, Comment Up in chair this PM for the gym  -MS      Recorded by [MS] Fermin Soria PT  [JM] Ilda Guzman PTA    Transfer Assessment/Treatment    Transfers, Sit-Stand [place] contact guard assist  -MS verbal cues required;minimum assist (75% patient effort);contact guard assist  -JM stand by assist;verbal cues required  -    Transfers, Stand-Sit [place] contact guard assist  -MS stand by assist;verbal cues required  -JM stand by assist;verbal cues required  -JM    Transfers, Sit-Stand-Sit, Assist Device rolling walker  -MS rolling walker  -JM standard walker  -JM    Recorded by [MS] Fermin Soria, JESÚS [JM] Ilda Guzmna PTA [JM] Ilda Guzman PTA    Gait  Assessment/Treatment    Gait, [place] Level contact guard assist  -MS stand by assist  -JM stand by assist  -JM    Gait, Assistive Device rolling walker  -MS standard walker  -JM standard walker  -JM    Gait, Distance (Feet) 100  -MS 80  -JM 45  -JM    Gait, Gait Pattern Analysis  swing-to gait  -JM swing-to gait  -JM    Gait, Gait Deviations left:;antalgic;yessica decreased;forward flexed posture  -MS  antalgic;yessica decreased;step length decreased  -JM    Gait, Safety Issues   step length decreased   pt unable to step into wx due to size of wx  -JM    Gait, Impairments  strength decreased;ROM decreased  -JM strength decreased;ROM decreased  -JM    Gait, Comment Verbal/tactile cues needed for posture correction.  -MS 3 standing rests due to wkness, UE fatigue  -JM pain and fatigue limiting  -JM    Recorded by [MS] Fermin Soria, PT [JM] Ilda Guzman PTA [JM] Ilda Guzman PTA    Stairs Assessment/Treatment    Stairs, Comment  pt and fam want to clarify w/MD about brace prior to trying stairs   pt/fam concerned that all bedrm/BR up steep flight steps?SNU  -JM try in pm if pn allows  -JM    Recorded by  [JM] Ilda Guzman PTA [JM] Ilda Guzman PTA    Therapy Exercises    Exercise Protocols total knee  -MS total knee  -JM total knee  -JM    Total Knee Exercises left:;30 reps;completed protocol  -MS left:;with assist;25 reps  -JM left:;20 reps;with assist  -JM    Recorded by [MS] Fermin Soria, PT [JM] Ilda Guzman PTA [JM] Ilda Guzman PTA    Positioning and Restraints    Pre-Treatment Position sitting in chair/recliner  -MS sitting in chair/recliner  -JM sitting in chair/recliner  -JM    Post Treatment Position chair  -MS  bed  -JM    In Bed   supine;call light within reach;encouraged to call for assist  -JM    In Chair notified nsg;reclined;sitting;call light within reach;encouraged to call for assist  -MS reclined;call light within reach;encouraged to call for assist;with  family/caregiver;with brace  -JM     Recorded by [MS] Fermin Soria, PT [JM] Ilda Guzman, PTA [JM] Ilda Guzman, PTA      12/13/17 9515          Rehab Assessment/Intervention    Discipline physical therapist  -AR      Document Type therapy note (daily note)  -AR      Subjective Information agree to therapy  -AR      Patient Effort, Rehab Treatment good  -AR      Precautions/Limitations fall precautions  -AR      Recorded by [AR] Dee Bradley, PT      Pain Assessment    Pain Assessment 0-10  -AR      Pain Score 8  -AR      Pain Type Surgical pain  -AR      Pain Location Knee  -AR      Pain Orientation Left  -AR      Pain Intervention(s) Repositioned;Cold applied;Medication (See MAR)  -AR      Recorded by [AR] Dee Bradley, PT      Vision Assessment/Intervention    Visual Impairment WNL  -AR      Recorded by [AR] Dee Bradley PT      Cognitive Assessment/Intervention    Current Cognitive/Communication Assessment functional  -AR      Orientation Status oriented x 4  -AR      Recorded by [AR] Dee Bradley, PT      ROM (Range of Motion)    General ROM Detail -10-64  -AR      Recorded by [AR] Dee Bradley PT      Bed Mobility, Assessment/Treatment    Bed Mob, Supine to Sit, [place] not tested  -AR      Bed Mob, Sit to Supine, [place] not tested  -AR      Recorded by [AR] Dee Bradley PT      Transfer Assessment/Treatment    Transfers, Sit-Stand [place] conditional independence  -AR      Transfers, Stand-Sit [place] conditional independence  -AR      Transfers, Sit-Stand-Sit, Assist Device standard walker  -AR      Recorded by [AR] Dee Bradley, PT      Gait Assessment/Treatment    Gait, [place] Level stand by assist  -AR      Gait, Assistive Device standard walker  -AR      Gait, Distance (Feet) 40  -AR      Gait, Gait Pattern Analysis swing-to gait  -AR      Gait, Gait Deviations antalgic;yessica decreased;decreased heel strike  -AR      Gait, Safety Issues step  length decreased  -AR      Gait, Impairments strength decreased;ROM decreased  -AR      Gait, Comment limited by pain  -AR      Recorded by [AR] Dee Bradley PT      Stairs Assessment/Treatment    Stairs, Comment pt declined 2/2 pain  -AR      Recorded by [AR] Dee Bradley PT      Therapy Exercises    Exercise Protocols total knee  -AR      Total Knee Exercises left:;15 reps;completed protocol  -AR      Recorded by [AR] Dee Bradley PT      Positioning and Restraints    Pre-Treatment Position sitting in chair/recliner  -AR      Post Treatment Position chair  -AR      In Chair sitting;reclined;call light within reach;encouraged to call for assist;exit alarm on  -AR      Recorded by [AR] Dee Bradley, PT        User Key  (r) = Recorded By, (t) = Taken By, (c) = Cosigned By    Initials Name Effective Dates    MARY ANNE Guzman, PTA 02/18/16 -     MS Fermin DEVONTE Soria, PT 12/01/15 -     AR Dee Bradley, PT 06/27/16 -                 IP PT Goals       12/13/17 0919          Gait Training PT LTG    Gait Training Goal PT LTG, Date Established 12/13/17  -      Gait Training Goal PT LTG, Time to Achieve 3 days  -LH      Gait Training Goal PT LTG, [place] Level conditional independence  -      Gait Training Goal PT LTG, Assist Device walker, rolling  -      Gait Training Goal PT LTG, Distance to Achieve 150  -LH      Stair Training PT LTG    Stair Training Goal PT LTG, Date Established 12/13/17  -      Stair Training Goal PT LTG, Time to Achieve 3 days  -LH      Stair Training Goal PT LTG, Number of Steps 4  -LH      Stair Training Goal PT LTG, [place] Level contact guard assist  -      Stair Training Goal PT LTG, Assist Device 2 handrails  -      Range of Motion PT LTG    Range of Motion Goal PT LTG, Date Established 12/13/17  -LH      Range of Motion Goal PT LTG, Time to Achieve 3 days  -LH      Range fo Motion Goal PT LTG, Joint L knee  -      Range of Motion Goal PT LTG, AROM  Measure 0-90  -        User Key  (r) = Recorded By, (t) = Taken By, (c) = Cosigned By    Initials Name Provider Type     Ashley Miller, PT Physical Therapist          Physical Therapy Education     Title: PT OT SLP Therapies (Done)     Topic: Physical Therapy (Done)     Point: Mobility training (Done)    Learning Progress Summary    Learner Readiness Method Response Comment Documented by Status   Patient Acceptance E,D VU,NR  MS 12/15/17 1634 Done    Acceptance E,TB,D VU   12/14/17 1545 Done    Acceptance E,TB VU,DU   12/13/17 1808 Done    Acceptance E VU,NR   12/13/17 0918 Done               Point: Home exercise program (Done)    Learning Progress Summary    Learner Readiness Method Response Comment Documented by Status   Patient Acceptance E,D VU,NR  MS 12/15/17 1634 Done    Acceptance E,TB,D VU   12/14/17 1545 Done    Acceptance E,TB VU,DU   12/13/17 1808 Done    Acceptance E VU,NR   12/13/17 0918 Done               Point: Body mechanics (Done)    Learning Progress Summary    Learner Readiness Method Response Comment Documented by Status   Patient Acceptance E,D VU,NR  MS 12/15/17 1634 Done    Acceptance E,TB,D VU   12/14/17 1545 Done    Acceptance E,TB VU,DU   12/13/17 1808 Done    Acceptance E VU,NR   12/13/17 0918 Done               Point: Precautions (Done)    Learning Progress Summary    Learner Readiness Method Response Comment Documented by Status   Patient Acceptance E,D VU,NR  MS 12/15/17 1634 Done    Acceptance E,TB,D VU   12/14/17 1545 Done    Acceptance E,TB VU,DU   12/13/17 1808 Done    Acceptance E VU,NR   12/13/17 0918 Done                      User Key     Initials Effective Dates Name Provider Type Discipline     02/07/17 -  Ashley Miller, PT Physical Therapist PT     02/18/16 -  Ilda Guzman, PTA Physical Therapy Assistant PT    MS 12/01/15 -  Fermin Soria, PT Physical Therapist PT     10/13/17 -  Aissatou Medina, RN Registered Nurse Nurse                     PT Recommendation and Plan  Anticipated Discharge Disposition: home with home health  Planned Therapy Interventions: balance training, bed mobility training, gait training, home exercise program, ROM (Range of Motion), stair training, strengthening, stretching, transfer training  PT Frequency: 2 times/day  Plan of Care Review  Plan Of Care Reviewed With: family, patient  Progress: improving  Outcome Summary/Follow up Plan: Improved tolerance to functional activity this PM with an increase in gait distance and progression of ther. ex. program          Outcome Measures       12/15/17 1600 12/14/17 1500 12/13/17 1600    How much help from another person do you currently need...    Turning from your back to your side while in flat bed without using bedrails? 3  -MS 3  -JM 4  -AR    Moving from lying on back to sitting on the side of a flat bed without bedrails? 3  -MS 3  -JM 4  -AR    Moving to and from a bed to a chair (including a wheelchair)? 3  -MS 3  -JM 4  -AR    Standing up from a chair using your arms (e.g., wheelchair, bedside chair)? 3  -MS 3  -JM 4  -AR    Climbing 3-5 steps with a railing? 3  -MS 1  -JM 3  -AR    To walk in hospital room? 3  -MS 3  -JM 3  -AR    AM-PAC 6 Clicks Score 18  -MS 16  -JM 22  -AR    Functional Assessment    Outcome Measure Options AM-PAC 6 Clicks Basic Mobility (PT)  -MS        12/13/17 0900          How much help from another person do you currently need...    Turning from your back to your side while in flat bed without using bedrails? 4  -LH      Moving from lying on back to sitting on the side of a flat bed without bedrails? 4  -LH      Moving to and from a bed to a chair (including a wheelchair)? 4  -LH      Standing up from a chair using your arms (e.g., wheelchair, bedside chair)? 4  -LH      Climbing 3-5 steps with a railing? 3  -LH      To walk in hospital room? 3  -LH      AM-PAC 6 Clicks Score 22  -LH      Functional Assessment    Outcome Measure Options  AM-PAC 6 Clicks Basic Mobility (PT)  [facility]        User Key  (r) = Recorded By, (t) = Taken By, (c) = Cosigned By    Initials Name Provider Type     Ashley Miller, PT Physical Therapist    MARY ANNE Guzman, PTA Physical Therapy Assistant    MS Fermin Soria, PT Physical Therapist    MARK Bradley, PT Physical Therapist           Time Calculation:         PT Charges       12/15/17 1635          Time Calculation    Start Time 1442  -MS      Stop Time 1510  -MS      Time Calculation (min) 28 min  -MS      PT Received On 12/15/17  -MS      PT - Next Appointment 12/16/17  -MS        User Key  (r) = Recorded By, (t) = Taken By, (c) = Cosigned By    Initials Name Provider Type    MS Fermin Soria, PT Physical Therapist          Therapy Charges for Today     Code Description Service Date Service Provider Modifiers Qty    59547690238 HC PT THER PROC EA 15 MIN 12/15/2017 Fermin Soria, PT GP 1    12911568892 HC PT THER PROC GROUP 12/15/2017 Fermin Soria, PT GP 1          PT G-Codes  Outcome Measure Options: AM-PAC 6 Clicks Basic Mobility (PT)    Fermin Soria, PT  12/15/2017

## 2017-12-15 NOTE — THERAPY TREATMENT NOTE
Acute Care - Physical Therapy Treatment Note  [facility]     Patient Name: Sophie Ace  : 1965  MRN: [id number]  Today's Date: 12/15/2017  Onset of Illness/Injury or Date of Surgery Date: 17  Date of Referral to PT: 17  Referring Physician: Vee    Admit Date: 2017    Visit Dx:    ICD-10-CM ICD-9-CM   1. Pain due to internal orthopedic prosthetic devices, implants and grafts, initial encounter T84.84XA 996.78     338.18   2. Impaired gait and mobility R26.89 781.2     Patient Active Problem List   Diagnosis   • Pain due to internal orthopedic prosthetic devices, implants and grafts, initial encounter               Adult Rehabilitation Note       12/15/17 1632 12/15/17 0950 17 1500    Rehab Assessment/Intervention    Discipline physical therapist  -MS physical therapy assistant  - physical therapy assistant  -    Document Type therapy note (daily note)  -MS therapy note (daily note)  - therapy note (daily note)  -    Subjective Information agree to therapy;complains of;fatigue;pain  -MS agree to therapy;complains of;weakness;fatigue;pain  - agree to therapy;complains of;weakness;fatigue;pain;swelling  -    Patient Effort, Rehab Treatment good  -MS      Symptoms Noted During/After Treatment fatigue  -MS      Precautions/Limitations fall precautions;brace on when up  -MS fall precautions;brace on when up  -JM fall precautions;brace on when up  -JM    Specific Treatment Considerations  brace on at all times, but allowed ROM so not locked out  - hinged knee brace on until MD clarifies  -    Recorded by [MS] Fermin Soria, PT [JM] Ilda Guzman PTA [JM] Ilda Guzman PTA    Pain Assessment    Pain Assessment 0-10  -MS 0-10  -JM 0-10  -JM    Pain Score 5  -MS 8  -JM 7  -JM    Post Pain Score 5  -MS      Pain Type Acute pain;Surgical pain  -MS Surgical pain  -JM Surgical pain  -JM    Pain Location Knee  -MS Knee  -JM Knee  -JM    Pain Orientation Left  -MS Left   -JM Left  -JM    Pain Intervention(s) Medication (See MAR);Cold applied;Repositioned;Elevated;Rest  -MS Medication (See MAR);Repositioned;Cold applied  -JM Medication (See MAR);Repositioned;Cold applied  -JM    Response to Interventions  genesis  -JM genesis  -JM    Recorded by [MS] Fermin Soria, PT [JM] Ilda Guzman PTA [JM] Ilda Guzman PTA    Cognitive Assessment/Intervention    Current Cognitive/Communication Assessment functional  -MS      Orientation Status oriented x 4  -MS      Follows Commands/Answers Questions 100% of the time  -MS      Personal Safety WNL/WFL  -MS      Personal Safety Interventions fall prevention program maintained;gait belt;nonskid shoes/slippers when out of bed;supervised activity  -MS      Recorded by [MS] Fermin Soria PT      ROM (Range of Motion)    General ROM Detail  -10-84  -JM -15-72  -JM    Recorded by  [JM] Ilda Guzman PTA [JM] Ilda Guzman PTA    Bed Mobility, Assessment/Treatment    Bed Mobility, Scoot/Bridge, [place]  contact guard assist  -     Bed Mobility, Comment Up in chair this PM for the gym  -MS in chair  -     Recorded by [MS] Fermin Soria, PT [JM] Ilda Guzman PTA     Transfer Assessment/Treatment    Transfers, Sit-Stand [place] contact guard assist  -MS verbal cues required;minimum assist (75% patient effort);contact guard assist  -JM verbal cues required;minimum assist (75% patient effort);contact guard assist  -JM    Transfers, Stand-Sit [place] contact guard assist  -MS stand by assist;verbal cues required  - stand by assist;verbal cues required  -    Transfers, Sit-Stand-Sit, Assist Device rolling walker  -MS rolling walker  -JM rolling walker  -JM    Recorded by [MS] Fermin Soria, PT [JM] Ilda Guzman PTA [JM] Ilda Guzman PTA    Gait Assessment/Treatment    Gait, [place] Level contact guard assist  -MS stand by assist  - stand by assist  -    Gait, Assistive Device rolling walker  -MS  standard walker  -JM standard walker  -JM    Gait, Distance (Feet) 100  -  -JM 80  -JM    Gait, Gait Pattern Analysis  swing-to gait  -JM swing-to gait  -JM    Gait, Gait Deviations left:;antalgic;yessica decreased;forward flexed posture  -MS      Gait, Impairments  strength decreased;ROM decreased  -JM strength decreased;ROM decreased  -JM    Gait, Comment Verbal/tactile cues needed for posture correction.  -MS  3 standing rests due to wkness, UE fatigue  -JM    Recorded by [MS] Fermin Soria, PT [JM] Ilda Guzman PTA [JM] Ilda Guzman PTA    Stairs Assessment/Treatment    Number of Stairs  8  -     Stairs, Handrail Location  left side (ascending)  -     Stairs, [place] Level  contact guard assist;verbal cues required  -     Stairs, Technique Used  step to step (ascending);step to step (descending)  -     Stairs, Comment  slow paced for safety w/getting used to perf w/hinged knee brace on  - pt and fam want to clarify w/MD about brace prior to trying stairs   pt/fam concerned that all bedrm/BR up steep flight steps?SNU  -JM    Recorded by  [JM] Ilda Guzman PTA [JM] Ilda Guzman PTA    Therapy Exercises    Exercise Protocols total knee  -MS total knee  -JM total knee  -JM    Total Knee Exercises left:;30 reps;completed protocol  -MS left:;with assist;30 reps  -JM left:;with assist;25 reps  -JM    Recorded by [MS] Fermin Soria, PT [JM] Ilda Guzman PTA [JM] Ilda Guzman PTA    Positioning and Restraints    Pre-Treatment Position sitting in chair/recliner  -MS sitting in chair/recliner  -JM sitting in chair/recliner  -JM    Post Treatment Position chair  -MS      In Chair notified nsg;reclined;sitting;call light within reach;encouraged to call for assist  -MS reclined;call light within reach;encouraged to call for assist  -JM reclined;call light within reach;encouraged to call for assist;with family/caregiver;with brace  -JM    Recorded by [MS] Fermin Soria, PT  [JM] Ilda Guzman PTA [JM] Ilda Guzman PTA      12/14/17 1000 12/13/17 1345       Rehab Assessment/Intervention    Discipline physical therapy assistant  -JM physical therapist  -AR     Document Type therapy note (daily note)  - therapy note (daily note)  -AR     Subjective Information agree to therapy;complains of;weakness;fatigue;pain;swelling  - agree to therapy  -AR     Patient Effort, Rehab Treatment  good  -AR     Precautions/Limitations fall precautions;brace on when up   clarifying w/MD about brace being worn at all times  - fall precautions  -AR     Specific Treatment Considerations hinged knee brace to support MCL laxity  -      Recorded by [MARY ANNE] Ilda Guzman PTA [AR] Dee Bradley, PT     Pain Assessment    Pain Assessment 0-10  - 0-10  -AR     Pain Score 7  -JM 8  -AR     Post Pain Score 9  -JM      Pain Type Surgical pain  - Surgical pain  -AR     Pain Location Knee  - Knee  -AR     Pain Orientation Left  - Left  -AR     Pain Intervention(s) Medication (See MAR);Repositioned   nsg to get fresh ice pack after dressing change  - Repositioned;Cold applied;Medication (See MAR)  -AR     Response to Interventions genesis, unchanged  -      Recorded by [MARY ANNE] Ilda Guzman PTA [AR] Dee Bradley, PT     Vision Assessment/Intervention    Visual Impairment  WNL  -AR     Recorded by  [AR] Dee Bradley PT     Cognitive Assessment/Intervention    Current Cognitive/Communication Assessment  functional  -AR     Orientation Status  oriented x 4  -AR     Recorded by  [AR] Dee Bradley PT     ROM (Range of Motion)    General ROM Detail will measure in pm   will check w/MD, ROM order but locked at 60'  - -10-64  -AR     Recorded by [MARY ANNE] Ilda Guzman PTA [AR] Dee Bradley, PT     Bed Mobility, Assessment/Treatment    Bed Mob, Supine to Sit, [place]  not tested  -AR     Bed Mob, Sit to Supine, [place] minimum assist (75% patient effort);moderate assist (50%  patient effort)  -JM not tested  -AR     Recorded by [JM] Ilda Guzman PTA [AR] Dee Bradley PT     Transfer Assessment/Treatment    Transfers, Sit-Stand [place] stand by assist;verbal cues required  - conditional independence  -AR     Transfers, Stand-Sit [place] stand by assist;verbal cues required  - conditional independence  -AR     Transfers, Sit-Stand-Sit, Assist Device standard walker  - standard walker  -AR     Recorded by [JM] Ilda Guzman PTA [AR] Dee Bradley PT     Gait Assessment/Treatment    Gait, [place] Level stand by assist  - stand by assist  -AR     Gait, Assistive Device standard walker  - standard walker  -AR     Gait, Distance (Feet) 45  -JM 40  -AR     Gait, Gait Pattern Analysis swing-to gait  -JM swing-to gait  -AR     Gait, Gait Deviations antalgic;yessica decreased;step length decreased  - antalgic;yessica decreased;decreased heel strike  -AR     Gait, Safety Issues step length decreased   pt unable to step into wx due to size of wx  - step length decreased  -AR     Gait, Impairments strength decreased;ROM decreased  - strength decreased;ROM decreased  -AR     Gait, Comment pain and fatigue limiting  - limited by pain  -AR     Recorded by [MARY ANNE] Ilda Guzman PTA [AR] Dee Bradley, PT     Stairs Assessment/Treatment    Stairs, Comment try in pm if pn allows  - pt declined 2/2 pain  -AR     Recorded by [JM] Ilda Guzman PTA [AR] Dee Bradley, PT     Therapy Exercises    Exercise Protocols total knee  - total knee  -AR     Total Knee Exercises left:;20 reps;with assist  -JM left:;15 reps;completed protocol  -AR     Recorded by [JM] Ilda Guzman PTA [AR] Dee Bradley, PT     Positioning and Restraints    Pre-Treatment Position sitting in chair/recliner  -JM sitting in chair/recliner  -AR     Post Treatment Position bed  - chair  -AR     In Bed supine;call light within reach;encouraged to call for assist  -JM      In  Chair  sitting;reclined;call light within reach;encouraged to call for assist;exit alarm on  -AR     Recorded by [JM] Ilda Guzman, JOSELO [AR] Dee Bradley PT       User Key  (r) = Recorded By, (t) = Taken By, (c) = Cosigned By    Initials Name Effective Dates    MARY ANNE Guzman, PTA 02/18/16 -     MS Fermin Soria, PT 12/01/15 -     AR Dee Bradley, PT 06/27/16 -                 IP PT Goals       12/13/17 0919          Gait Training PT LTG    Gait Training Goal PT LTG, Date Established 12/13/17  -      Gait Training Goal PT LTG, Time to Achieve 3 days  -      Gait Training Goal PT LTG, [place] Level conditional independence  -      Gait Training Goal PT LTG, Assist Device walker, rolling  -      Gait Training Goal PT LTG, Distance to Achieve 150  -LH      Stair Training PT LTG    Stair Training Goal PT LTG, Date Established 12/13/17  -      Stair Training Goal PT LTG, Time to Achieve 3 days  -      Stair Training Goal PT LTG, Number of Steps 4  -      Stair Training Goal PT LTG, [place] Level contact guard assist  -      Stair Training Goal PT LTG, Assist Device 2 handrails  -      Range of Motion PT LTG    Range of Motion Goal PT LTG, Date Established 12/13/17  -      Range of Motion Goal PT LTG, Time to Achieve 3 days  -      Range fo Motion Goal PT LTG, Joint L knee  -      Range of Motion Goal PT LTG, AROM Measure 0-90  -        User Key  (r) = Recorded By, (t) = Taken By, (c) = Cosigned By    Initials Name Provider Type     Ashley Miller PT Physical Therapist          Physical Therapy Education     Title: PT OT SLP Therapies (Done)     Topic: Physical Therapy (Done)     Point: Mobility training (Done)    Learning Progress Summary    Learner Readiness Method Response Comment Documented by Status   Patient Acceptance SIDDHARTH LIEBERMAN NR  MS 12/15/17 1634 Done    Acceptance LAWRENCE LIEBERMAN D VU   12/14/17 1545 Done    Acceptance LAWRENCE LIEBERMAN DU ST 12/13/17 1808 Done    Acceptance E  VU,NR   12/13/17 0918 Done               Point: Home exercise program (Done)    Learning Progress Summary    Learner Readiness Method Response Comment Documented by Status   Patient Acceptance E,D VU,NR  MS 12/15/17 1634 Done    Acceptance E,TB,D VU   12/14/17 1545 Done    Acceptance E,TB VU,DU  ST 12/13/17 1808 Done    Acceptance E VU,NR   12/13/17 0918 Done               Point: Body mechanics (Done)    Learning Progress Summary    Learner Readiness Method Response Comment Documented by Status   Patient Acceptance E,D VU,NR  MS 12/15/17 1634 Done    Acceptance E,TB,D VU   12/14/17 1545 Done    Acceptance E,TB VU,DU   12/13/17 1808 Done    Acceptance E VU,NR   12/13/17 0918 Done               Point: Precautions (Done)    Learning Progress Summary    Learner Readiness Method Response Comment Documented by Status   Patient Acceptance E,D VU,NR  MS 12/15/17 1634 Done    Acceptance E,TB,D VU   12/14/17 1545 Done    Acceptance E,TB VU,DU   12/13/17 1808 Done    Acceptance E VU,NR   12/13/17 0918 Done                      User Key     Initials Effective Dates Name Provider Type Discipline     02/07/17 -  Ashley Miller, PT Physical Therapist PT     02/18/16 -  Ilda Guzman PTA Physical Therapy Assistant PT    MS 12/01/15 -  Fermin Soria, PT Physical Therapist PT     10/13/17 -  Aissatou Medina RN Registered Nurse Nurse                    PT Recommendation and Plan  Anticipated Discharge Disposition: home with home health  Planned Therapy Interventions: balance training, bed mobility training, gait training, home exercise program, ROM (Range of Motion), stair training, strengthening, stretching, transfer training  PT Frequency: 2 times/day  Plan of Care Review  Plan Of Care Reviewed With: patient  Progress: progress toward functional goals is gradual  Outcome Summary/Follow up Plan: more assist req today due to pain and fatigue          Outcome Measures       12/15/17 1600 12/14/17 1500  12/13/17 1600    How much help from another person do you currently need...    Turning from your back to your side while in flat bed without using bedrails? 3  -MS 3  -JM 4  -AR    Moving from lying on back to sitting on the side of a flat bed without bedrails? 3  -MS 3  -JM 4  -AR    Moving to and from a bed to a chair (including a wheelchair)? 3  -MS 3  -JM 4  -AR    Standing up from a chair using your arms (e.g., wheelchair, bedside chair)? 3  -MS 3  -JM 4  -AR    Climbing 3-5 steps with a railing? 3  -MS 1  -JM 3  -AR    To walk in hospital room? 3  -MS 3  -JM 3  -AR    AM-PAC 6 Clicks Score 18  -MS 16  -JM 22  -AR    Functional Assessment    Outcome Measure Options AM-PAC 6 Clicks Basic Mobility (PT)  -MS        12/13/17 0900          How much help from another person do you currently need...    Turning from your back to your side while in flat bed without using bedrails? 4  -LH      Moving from lying on back to sitting on the side of a flat bed without bedrails? 4  -LH      Moving to and from a bed to a chair (including a wheelchair)? 4  -LH      Standing up from a chair using your arms (e.g., wheelchair, bedside chair)? 4  -LH      Climbing 3-5 steps with a railing? 3  -LH      To walk in hospital room? 3  -LH      AM-PAC 6 Clicks Score 22  -LH      Functional Assessment    Outcome Measure Options AM-PAC 6 Clicks Basic Mobility (PT)  -        User Key  (r) = Recorded By, (t) = Taken By, (c) = Cosigned By    Initials Name Provider Type     Ashley Miller, PT Physical Therapist    MARY ANNE Guzman, PTA Physical Therapy Assistant    MS Fermin Soria, PT Physical Therapist    AR Dee Bradley, PT Physical Therapist           Time Calculation:         PT Charges       12/15/17 1635 12/15/17 1512       Time Calculation    Start Time 1442  -MS 0937  -JM     Stop Time 1510  -MS 1020  -JM     Time Calculation (min) 28 min  -MS 43 min  -JM     PT Received On 12/15/17  -MS 12/15/17  -     PT - Next  Appointment 12/16/17  -MS 12/15/17  -MARY ANNE       User Key  (r) = Recorded By, (t) = Taken By, (c) = Cosigned By    Initials Name Provider Type    MARY ANNE Guzman PTA Physical Therapy Assistant    MS Fermin WHITNEY Yang, PT Physical Therapist          Therapy Charges for Today     Code Description Service Date Service Provider Modifiers Qty    61648190558 HC PT THER PROC EA 15 MIN 12/14/2017 Ilda Guzman, PTA GP 2    41656371505 HC PT THER PROC GROUP 12/14/2017 Ilda Guzman, PTA GP 1    34273492189 HC PT THER PROC EA 15 MIN 12/14/2017 Ilda Guzman, PTA GP 2    05379598881 HC PT THER PROC GROUP 12/14/2017 Ilda Guzman PTA GP 1    67490205637 HC PT THER PROC EA 15 MIN 12/15/2017 Ilda Guzman, PTA GP 2    18226170854 HC PT THER PROC GROUP 12/15/2017 Ilda Guzman, PTA GP 1          PT G-Codes  Outcome Measure Options: AM-PAC 6 Clicks Basic Mobility (PT)    Ilda Guzman PTA  12/15/2017

## 2017-12-15 NOTE — PLAN OF CARE
Problem: Patient Care Overview (Adult)  Goal: Plan of Care Review  Outcome: Ongoing (interventions implemented as appropriate)    12/15/17 0321   Coping/Psychosocial Response Interventions   Plan Of Care Reviewed With patient   Patient Care Overview   Progress improving   Outcome Evaluation   Outcome Summary/Follow up Plan HTN well controlled. pt amb well with assist. pain well controlled.          Problem: Knee Replacement, Total (Adult)  Goal: Signs and Symptoms of Listed Potential Problems Will be Absent or Manageable (Knee Replacement, Total)  Outcome: Ongoing (interventions implemented as appropriate)    12/15/17 0321   Knee Replacement, Total   Problems Assessed (Total Knee Replacement) all   Problems Present (Total Knee Replacement) decreased range of motion;functional decline/self care deficit         Problem: Fall Risk (Adult)  Goal: Absence of Falls  Outcome: Ongoing (interventions implemented as appropriate)    12/15/17 0321   Fall Risk (Adult)   Absence of Falls making progress toward outcome

## 2017-12-15 NOTE — PROGRESS NOTES
Name: Sophie Ace ADMIT: 2017   : 1965  PCP: Kim Barr MD    MRN: [id number] LOS: 3 days   AGE/SEX: 52 y.o. female  ROOM: [facility]   Subjective   Subjective  No symptoms of hypotension (dizziness, vision changes, palpitations). Chronic fatigue present. No CP SOA NVD.    Objective   Vital Signs  Temp:  [97.5 °F (36.4 °C)-98 °F (36.7 °C)] 97.9 °F (36.6 °C)  Heart Rate:  [76-90] 76  Resp:  [16-18] 18  BP: ()/(57-78) 105/68  SpO2:  [93 %-100 %] 98 %  on   ;   O2 Device: room air  Body mass index is 42.89 kg/(m^2).    Physical Exam   Constitutional: She appears well-developed and well-nourished. No distress.   HENT:   Head: Normocephalic and atraumatic.   Eyes: EOM are normal. Pupils are equal, round, and reactive to light.   Neck: Normal range of motion. Neck supple.   Cardiovascular: Normal rate, regular rhythm and intact distal pulses.    No murmur heard.  Pulmonary/Chest: Effort normal and breath sounds normal. She has no wheezes.   Abdominal: Soft. Bowel sounds are normal. She exhibits no distension. There is no tenderness.   Musculoskeletal: She exhibits no edema.   LLE dressed   Neurological: She is alert. No cranial nerve deficit.   Skin: Skin is warm and dry. She is not diaphoretic.   Psychiatric: She has a normal mood and affect. Her behavior is normal.   Nursing note and vitals reviewed.      Results Review:       I reviewed the patient's new clinical results. Reviewed imaging, agree with interpretation.     Results from last 7 days  Lab Units 17  0357 17  1256   WBC 10*3/mm3 7.61 4.49*   HEMOGLOBIN g/dL 10.3* 12.9   PLATELETS 10*3/mm3 152 221       Results from last 7 days  Lab Units 12/15/17  0356 17  1256   SODIUM mmol/L 140 142   POTASSIUM mmol/L 3.2* 3.5   CHLORIDE mmol/L 100 102   CO2 mmol/L 27.6 24.7   BUN mg/dL 7 11   CREATININE mg/dL 0.45* 0.62   GLUCOSE mg/dL 100* 100*   Estimated Creatinine Clearance: 178 mL/min (by C-G formula based on Cr of  0.45).    Results from last 7 days  Lab Units 12/15/17  0356 12/11/17  1256   CALCIUM mg/dL 8.1* 9.2         apixaban 2.5 mg Oral Q12H   DULoxetine 120 mg Oral Daily   FLUoxetine 20 mg Oral QAM   fluticasone 2 spray Each Nare QAM   pantoprazole 40 mg Oral QAM   sennosides-docusate sodium 2 tablet Oral BID   vitamin E 400 Units Oral QAM      Diet Regular      Assessment/Plan   Active Hospital Problems (** Indicates Principal Problem)    Diagnosis Date Noted   • Pain due to internal orthopedic prosthetic devices, implants and grafts, initial encounter [T84.84XA] 12/12/2017      Resolved Hospital Problems    Diagnosis Date Noted Date Resolved   No resolved problems to display.   HTN  -BP better off meds  -continue to monitor off antihypertensives-patient instructed to stop these after discharge and monitor her BP with her cuff at home-she confirmed she has one  -f/u with PCP early next month as scheduled with BP data    Chronic fatigue   -Likely related to her chronic pain and opiate therapy, low BP can contribute as well.    Postoperative Anemia  -continue to monitor   -consider temporary Fe supplement at discharge    Hypokalemia  -replace    S/P L TKA revision  -per primary    Will continue to follow. Please call with any questions or concerns.    Fermin Garza MD  [facility]  12/15/17  6:51 PM

## 2017-12-15 NOTE — PLAN OF CARE
Problem: Patient Care Overview (Adult)  Goal: Plan of Care Review    12/15/17 9999   Coping/Psychosocial Response Interventions   Plan Of Care Reviewed With family;patient   Patient Care Overview   Progress improving   Outcome Evaluation   Outcome Summary/Follow up Plan Improved tolerance to functional activity this PM with an increase in gait distance and progression of ther. ex. program

## 2017-12-16 VITALS
TEMPERATURE: 96.8 F | HEIGHT: 64 IN | OXYGEN SATURATION: 95 % | WEIGHT: 250 LBS | HEART RATE: 88 BPM | SYSTOLIC BLOOD PRESSURE: 103 MMHG | BODY MASS INDEX: 42.68 KG/M2 | RESPIRATION RATE: 20 BRPM | DIASTOLIC BLOOD PRESSURE: 64 MMHG

## 2017-12-16 PROCEDURE — 97110 THERAPEUTIC EXERCISES: CPT

## 2017-12-16 PROCEDURE — 97150 GROUP THERAPEUTIC PROCEDURES: CPT

## 2017-12-16 RX ORDER — DOXYCYCLINE HYCLATE 50 MG/1
324 CAPSULE, GELATIN COATED ORAL
Qty: 30 TABLET | Refills: 0 | Status: SHIPPED | OUTPATIENT
Start: 2017-12-16 | End: 2018-07-02

## 2017-12-16 RX ADMIN — VITAMIN E CAP 400 UNIT 400 UNITS: 400 CAP at 06:44

## 2017-12-16 RX ADMIN — DULOXETINE HYDROCHLORIDE 120 MG: 60 CAPSULE, DELAYED RELEASE ORAL at 08:13

## 2017-12-16 RX ADMIN — FLUOXETINE HYDROCHLORIDE 20 MG: 20 CAPSULE ORAL at 06:44

## 2017-12-16 RX ADMIN — FLUTICASONE PROPIONATE 2 SPRAY: 50 SPRAY, METERED NASAL at 06:18

## 2017-12-16 RX ADMIN — OXYCODONE HYDROCHLORIDE AND ACETAMINOPHEN 2 TABLET: 10; 325 TABLET ORAL at 15:24

## 2017-12-16 RX ADMIN — PANTOPRAZOLE SODIUM 40 MG: 40 TABLET, DELAYED RELEASE ORAL at 06:17

## 2017-12-16 RX ADMIN — APIXABAN 2.5 MG: 2.5 TABLET, FILM COATED ORAL at 08:13

## 2017-12-16 RX ADMIN — DOCUSATE SODIUM -SENNOSIDES 2 TABLET: 50; 8.6 TABLET, COATED ORAL at 08:14

## 2017-12-16 RX ADMIN — OXYCODONE HYDROCHLORIDE AND ACETAMINOPHEN 2 TABLET: 10; 325 TABLET ORAL at 06:17

## 2017-12-16 RX ADMIN — OXYCODONE HYDROCHLORIDE AND ACETAMINOPHEN 2 TABLET: 10; 325 TABLET ORAL at 10:34

## 2017-12-16 NOTE — PROGRESS NOTES
Name: Sophie Ace ADMIT: 2017   : 1965  PCP: Kim Barr MD    MRN: [id number] LOS: 4 days   AGE/SEX: 52 y.o. female  ROOM: [facility]   Subjective   Subjective  No symptoms of hypotension (dizziness, vision changes, palpitations). Chronic fatigue present. No CP, dyspnea, n/v/d.  Taking PO.    Objective   Vital Signs  Temp:  [97.6 °F (36.4 °C)-98.8 °F (37.1 °C)] 98.5 °F (36.9 °C)  Heart Rate:  [71-81] 71  Resp:  [16-18] 18  BP: ()/(63-78) 101/64  SpO2:  [97 %-100 %] 97 %  on   ;   O2 Device: room air  Body mass index is 42.89 kg/(m^2).    Physical Exam   Constitutional: She appears well-developed and well-nourished. No distress.   HENT:   Head: Normocephalic and atraumatic.   Eyes: EOM are normal. Pupils are equal, round, and reactive to light.   Neck: Normal range of motion. Neck supple.   Cardiovascular: Normal rate, regular rhythm and intact distal pulses.    No murmur heard.  Pulmonary/Chest: Effort normal and breath sounds normal. She has no wheezes.   Abdominal: Soft. Bowel sounds are normal. She exhibits no distension. There is no tenderness.   Musculoskeletal: She exhibits no edema.   LLE dressed   Neurological: She is alert. No cranial nerve deficit.   Skin: Skin is warm and dry. She is not diaphoretic.   Psychiatric: She has a normal mood and affect. Her behavior is normal.   Nursing note and vitals reviewed.      Results Review:       I reviewed the patient's new clinical results. Reviewed imaging, agree with interpretation.     Results from last 7 days  Lab Units 17  0357 17  1256   WBC 10*3/mm3 7.61 4.49*   HEMOGLOBIN g/dL 10.3* 12.9   PLATELETS 10*3/mm3 152 221       Results from last 7 days  Lab Units 12/15/17  0356 17  1256   SODIUM mmol/L 140 142   POTASSIUM mmol/L 3.2* 3.5   CHLORIDE mmol/L 100 102   CO2 mmol/L 27.6 24.7   BUN mg/dL 7 11   CREATININE mg/dL 0.45* 0.62   GLUCOSE mg/dL 100* 100*   Estimated Creatinine Clearance: 178 mL/min (by C-G formula  based on Cr of 0.45).    Results from last 7 days  Lab Units 12/15/17  0356 12/11/17  1256   CALCIUM mg/dL 8.1* 9.2         apixaban 2.5 mg Oral Q12H   DULoxetine 120 mg Oral Daily   FLUoxetine 20 mg Oral QAM   fluticasone 2 spray Each Nare QAM   pantoprazole 40 mg Oral QAM   sennosides-docusate sodium 2 tablet Oral BID   vitamin E 400 Units Oral QAM      Diet Regular      Assessment/Plan   Active Hospital Problems (** Indicates Principal Problem)    Diagnosis Date Noted   • Pain due to internal orthopedic prosthetic devices, implants and grafts, initial encounter [T84.84XA] 12/12/2017      Resolved Hospital Problems    Diagnosis Date Noted Date Resolved   No resolved problems to display.   HTN  -BP better off meds  -continue to monitor off antihypertensives-patient instructed to stop these after discharge and monitor her BP with her cuff at home-she confirmed she has one  -f/u with PCP early next month as scheduled with BP data    Chronic fatigue   -Likely related to her chronic pain and opiate therapy, low BP can contribute as well.    Postoperative Anemia  -mild, expected  -Fe supplement at discharge, Rx written    Hypokalemia  -replaced    S/P L TKA revision  -per primary    Disposition  Per primary.  Okay to discharge at any time from medicine standpoint.    Fermin Garza MD  [facility]  12/16/17  10:13 AM

## 2017-12-16 NOTE — THERAPY TREATMENT NOTE
Acute Care - Physical Therapy Treatment Note  [facility]     Patient Name: Sophie Ace  : 1965  MRN: [id number]  Today's Date: 2017  Onset of Illness/Injury or Date of Surgery Date: 17  Date of Referral to PT: 17  Referring Physician: Vee    Admit Date: 2017    Visit Dx:    ICD-10-CM ICD-9-CM   1. Pain due to internal orthopedic prosthetic devices, implants and grafts, initial encounter T84.84XA 996.78     338.18   2. Impaired gait and mobility R26.89 781.2     Patient Active Problem List   Diagnosis   • Pain due to internal orthopedic prosthetic devices, implants and grafts, initial encounter               Adult Rehabilitation Note       17 1301 12/15/17 1632 12/15/17 0950    Rehab Assessment/Intervention    Discipline physical therapist  -MS physical therapist  -MS physical therapy assistant  -    Document Type therapy note (daily note)  -MS therapy note (daily note)  -MS therapy note (daily note)  -    Subjective Information agree to therapy;complains of;fatigue;pain  -MS agree to therapy;complains of;fatigue;pain  -MS agree to therapy;complains of;weakness;fatigue;pain  -JM    Patient Effort, Rehab Treatment good  -MS good  -MS     Symptoms Noted During/After Treatment fatigue  -MS fatigue  -MS     Precautions/Limitations brace on when up  -MS fall precautions;brace on when up  -MS fall precautions;brace on when up  -JM    Specific Treatment Considerations   brace on at all times, but allowed ROM so not locked out  -    Recorded by [MS] Fermin Soria, PT [MS] Fermin Soria, PT [JM] Ilda Guzman PTA    Pain Assessment    Pain Assessment 0-10  -MS 0-10  -MS 0-10  -JM    Pain Score 5  -MS 5  -MS 8  -JM    Post Pain Score 5  -MS 5  -MS     Pain Type Acute pain;Surgical pain  -MS Acute pain;Surgical pain  -MS Surgical pain  -JM    Pain Location Knee  -MS Knee  -MS Knee  -JM    Pain Orientation Left  -MS Left  -MS Left  -JM    Pain Intervention(s) Medication  (See MAR);Cold applied;Repositioned;Elevated;Rest  -MS Medication (See MAR);Cold applied;Repositioned;Elevated;Rest  -MS Medication (See MAR);Repositioned;Cold applied  -JM    Response to Interventions   genesis  -JM    Recorded by [MS] Fermin Soria PT [MS] Fermin Soria PT [JM] Ilda Guzman, PTA    Cognitive Assessment/Intervention    Current Cognitive/Communication Assessment functional  -MS functional  -MS     Orientation Status oriented x 4  -MS oriented x 4  -MS     Follows Commands/Answers Questions 100% of the time  -% of the time  -MS     Personal Safety WNL/WFL  -MS WNL/WFL  -MS     Personal Safety Interventions fall prevention program maintained;gait belt;nonskid shoes/slippers when out of bed;supervised activity  -MS fall prevention program maintained;gait belt;nonskid shoes/slippers when out of bed;supervised activity  -MS     Recorded by [MS] Fermin Soria PT [MS] Fermin Soria PT     ROM (Range of Motion)    General ROM Detail Left knee AROM (11, 79)  -MS  -10-84  -JM    Recorded by [MS] Fermin Soria PT  [JM] Ilda Guzman PTA    Bed Mobility, Assessment/Treatment    Bed Mobility, Scoot/Bridge, [place]   contact guard assist  -JM    Bed Mobility, Comment Up in chair.  -MS Up in chair this PM for the gym  -MS in chair  -JM    Recorded by [MS] Fermin Soria PT [MS] Fermin Soria, PT [JM] Ilda Guzman, PTA    Transfer Assessment/Treatment    Transfers, Sit-Stand [place] stand by assist  -MS contact guard assist  -MS verbal cues required;minimum assist (75% patient effort);contact guard assist  -JM    Transfers, Stand-Sit [place] stand by assist  -MS contact guard assist  -MS stand by assist;verbal cues required  -JM    Transfers, Sit-Stand-Sit, Assist Device rolling walker  -MS rolling walker  -MS rolling walker  -JM    Recorded by [MS] Fermin Soria PT [MS] Fermin Soria PT [JM] Ilda Guzman, PTA    Gait Assessment/Treatment    Gait,  [place] Level contact guard assist  -MS contact guard assist  -MS stand by assist  -    Gait, Assistive Device rolling walker  -MS rolling walker  -MS standard walker  -    Gait, Distance (Feet) 110  -  -  -JM    Gait, Gait Pattern Analysis swing-to gait  -MS  swing-to gait  -    Gait, Gait Deviations left:;antalgic;yessica decreased  -MS left:;antalgic;yessica decreased;forward flexed posture  -MS     Gait, Impairments   strength decreased;ROM decreased  -    Gait, Comment  Verbal/tactile cues needed for posture correction.  -MS     Recorded by [MS] Fermin Soria PT [MS] Fermin Soria PT [JM] Ilda Guzman PTA    Stairs Assessment/Treatment    Number of Stairs   8  -    Stairs, Handrail Location   left side (ascending)  -    Stairs, [place] Level   contact guard assist;verbal cues required  -    Stairs, Technique Used   step to step (ascending);step to step (descending)  -    Stairs, Comment   slow paced for safety w/getting used to perf w/hinged knee brace on  -    Recorded by   [JM] Ilda Guzman PTA    Therapy Exercises    Exercise Protocols total knee  -MS total knee  -MS total knee  -JM    Total Knee Exercises left:;30 reps;completed protocol  -MS left:;30 reps;completed protocol  -MS left:;with assist;30 reps  -    Recorded by [MS] Fermin Soria, PT [MS] Fermin Soria PT [JM] Ilda Guzman PTA    Positioning and Restraints    Pre-Treatment Position sitting in chair/recliner  -MS sitting in chair/recliner  -MS sitting in chair/recliner  -    Post Treatment Position chair  -MS chair  -MS     In Chair notified nsg;reclined;sitting;call light within reach;encouraged to call for assist;with family/caregiver  -MS notified nsg;reclined;sitting;call light within reach;encouraged to call for assist  -MS reclined;call light within reach;encouraged to call for assist  -    Recorded by [MS] Fermin Soria, PT [MS] Fermin Soria PT [JM] Ilda  JOSELO Guzman      12/14/17 1500 12/14/17 1000 12/13/17 1345    Rehab Assessment/Intervention    Discipline physical therapy assistant  -MARY ANNE physical therapy assistant  -JM physical therapist  -AR    Document Type therapy note (daily note)  - therapy note (daily note)  - therapy note (daily note)  -AR    Subjective Information agree to therapy;complains of;weakness;fatigue;pain;swelling  - agree to therapy;complains of;weakness;fatigue;pain;swelling  - agree to therapy  -AR    Patient Effort, Rehab Treatment   good  -AR    Precautions/Limitations fall precautions;brace on when up  - fall precautions;brace on when up   clarifying w/MD about brace being worn at all times  - fall precautions  -AR    Specific Treatment Considerations hinged knee brace on until MD clarifies  - hinged knee brace to support MCL laxity  -     Recorded by [JM] Ilda Guzman PTA [JM] Ilda Guzman PTA [AR] Dee Bradley, PT    Pain Assessment    Pain Assessment 0-10  -JM 0-10  -JM 0-10  -AR    Pain Score 7  -JM 7  -JM 8  -AR    Post Pain Score  9  -JM     Pain Type Surgical pain  -JM Surgical pain  - Surgical pain  -AR    Pain Location Knee  - Knee  - Knee  -AR    Pain Orientation Left  -JM Left  -JM Left  -AR    Pain Intervention(s) Medication (See MAR);Repositioned;Cold applied  - Medication (See MAR);Repositioned   nsg to get fresh ice pack after dressing change  - Repositioned;Cold applied;Medication (See MAR)  -AR    Response to Interventions genesis  -JM genesis, unchanged  -JM     Recorded by [JM] Ilda Guzman PTA [JM] Ilda Guzman PTA [AR] Dee Bradley, PT    Vision Assessment/Intervention    Visual Impairment   WNL  -AR    Recorded by   [AR] Dee Bradley PT    Cognitive Assessment/Intervention    Current Cognitive/Communication Assessment   functional  -AR    Orientation Status   oriented x 4  -AR    Recorded by   [AR] Dee Bradley, PT    ROM (Range of Motion)    General ROM Detail -15-72  -  will measure in pm   will check w/MD, ROM order but locked at 60'  - -10-64  -AR    Recorded by [JM] Ilda Guzman PTA [JM] Ilda Guzman PTA [AR] Dee Bradley, PT    Bed Mobility, Assessment/Treatment    Bed Mob, Supine to Sit, [place]   not tested  -AR    Bed Mob, Sit to Supine, [place]  minimum assist (75% patient effort);moderate assist (50% patient effort)  -JM not tested  -AR    Recorded by  [JM] Ilda Guzman PTA [AR] Dee Bradley PT    Transfer Assessment/Treatment    Transfers, Sit-Stand [place] verbal cues required;minimum assist (75% patient effort);contact guard assist  - stand by assist;verbal cues required  - conditional independence  -AR    Transfers, Stand-Sit [place] stand by assist;verbal cues required  - stand by assist;verbal cues required  - conditional independence  -AR    Transfers, Sit-Stand-Sit, Assist Device rolling walker  - standard walker  - standard walker  -AR    Recorded by [JM] Ilda Guzman PTA [JM] Ilda Guzman PTA [AR] Dee Bradley, PT    Gait Assessment/Treatment    Gait, [place] Level stand by assist  - stand by assist  - stand by assist  -AR    Gait, Assistive Device standard walker  - standard walker  - standard walker  -AR    Gait, Distance (Feet) 80  -JM 45  -JM 40  -AR    Gait, Gait Pattern Analysis swing-to gait  -JM swing-to gait  -JM swing-to gait  -AR    Gait, Gait Deviations  antalgic;yessica decreased;step length decreased  - antalgic;yessica decreased;decreased heel strike  -AR    Gait, Safety Issues  step length decreased   pt unable to step into wx due to size of wx  - step length decreased  -AR    Gait, Impairments strength decreased;ROM decreased  - strength decreased;ROM decreased  - strength decreased;ROM decreased  -AR    Gait, Comment 3 standing rests due to wkness, UE fatigue  - pain and fatigue limiting  - limited by pain  -AR    Recorded by [JM] Ilda Guzman PTA [JM]  Ilda Guzman PTA [AR] Dee Bradley, PT    Stairs Assessment/Treatment    Stairs, Comment pt and fam want to clarify w/MD about brace prior to trying stairs   pt/fam concerned that all bedrm/BR up steep flight steps?SNU  -JM try in pm if pn allows  -JM pt declined 2/2 pain  -AR    Recorded by [MARY ANNE] Ilda Guzman PTA [JM] Ilda Guzman PTA [AR] Dee Bradley PT    Therapy Exercises    Exercise Protocols total knee  -JM total knee  -JM total knee  -AR    Total Knee Exercises left:;with assist;25 reps  -JM left:;20 reps;with assist  -JM left:;15 reps;completed protocol  -AR    Recorded by [MARY ANNE] Ilda Guzman PTA [JM] Ilda Guzman PTA [AR] Dee Bradley PT    Positioning and Restraints    Pre-Treatment Position sitting in chair/recliner  -JM sitting in chair/recliner  -JM sitting in chair/recliner  -AR    Post Treatment Position  bed  - chair  -AR    In Bed  supine;call light within reach;encouraged to call for assist  -JM     In Chair reclined;call light within reach;encouraged to call for assist;with family/caregiver;with brace  -JM  sitting;reclined;call light within reach;encouraged to call for assist;exit alarm on  -AR    Recorded by [MARY ANNE] Ilda Guzman PTA [JM] Ilda Guzman PTA [AR] Dee Bradley PT      User Key  (r) = Recorded By, (t) = Taken By, (c) = Cosigned By    Initials Name Effective Dates    MARY ANNE Guzman, PTA 02/18/16 -     MS Fermin Soria, PT 12/01/15 -     AR Dee Bradley, PT 06/27/16 -                 IP PT Goals       12/13/17 0919          Gait Training PT LTG    Gait Training Goal PT LTG, Date Established 12/13/17  -LH      Gait Training Goal PT LTG, Time to Achieve 3 days  -      Gait Training Goal PT LTG, [place] Level conditional independence  -      Gait Training Goal PT LTG, Assist Device walker, rolling  -      Gait Training Goal PT LTG, Distance to Achieve 150  -LH      Stair Training PT LTG    Stair Training Goal PT LTG, Date  Established 12/13/17  -      Stair Training Goal PT LTG, Time to Achieve 3 days  -      Stair Training Goal PT LTG, Number of Steps 4  -      Stair Training Goal PT LTG, [place] Level contact guard assist  -      Stair Training Goal PT LTG, Assist Device 2 handrails  -      Range of Motion PT LTG    Range of Motion Goal PT LTG, Date Established 12/13/17  -      Range of Motion Goal PT LTG, Time to Achieve 3 days  -      Range fo Motion Goal PT LTG, Joint L knee  -      Range of Motion Goal PT LTG, AROM Measure 0-90  -        User Key  (r) = Recorded By, (t) = Taken By, (c) = Cosigned By    Initials Name Provider Type     Ashley Miller, PT Physical Therapist          Physical Therapy Education     Title: PT OT SLP Therapies (Done)     Topic: Physical Therapy (Done)     Point: Mobility training (Done)    Learning Progress Summary    Learner Readiness Method Response Comment Documented by Status   Patient Acceptance E,D VU,NR  MS 12/16/17 1303 Done    Acceptance E,D VU,NR  MS 12/15/17 1634 Done    Acceptance E,TB,D VU   12/14/17 1545 Done    Acceptance E,TB VU,DU   12/13/17 1808 Done    Acceptance E VU,NR   12/13/17 0918 Done               Point: Home exercise program (Done)    Learning Progress Summary    Learner Readiness Method Response Comment Documented by Status   Patient Acceptance E,D VU,NR  MS 12/16/17 1303 Done    Acceptance E,D VU,NR  MS 12/15/17 1634 Done    Acceptance E,TB,D VU   12/14/17 1545 Done    Acceptance E,TB VU,DU   12/13/17 1808 Done    Acceptance E VU,NR   12/13/17 0918 Done               Point: Body mechanics (Done)    Learning Progress Summary    Learner Readiness Method Response Comment Documented by Status   Patient Acceptance E,D VU,NR  MS 12/16/17 1303 Done    Acceptance E,D VU,NR  MS 12/15/17 1634 Done    Acceptance E,TB,D VU   12/14/17 1545 Done    Acceptance E,TB VU,DU   12/13/17 1808 Done    Acceptance E VU,NR   12/13/17 0918 Done                Point: Precautions (Done)    Learning Progress Summary    Learner Readiness Method Response Comment Documented by Status   Patient Acceptance E,D VU,NR  MS 12/16/17 1303 Done    Acceptance E,D VU,NR  MS 12/15/17 1634 Done    Acceptance E,TB,D VU   12/14/17 1545 Done    Acceptance E,TB VU,DU   12/13/17 1808 Done    Acceptance E VU,NR   12/13/17 0918 Done                      User Key     Initials Effective Dates Name Provider Type Discipline     02/07/17 -  Ashley Miller, PT Physical Therapist PT     02/18/16 -  Ilda Guzman PTA Physical Therapy Assistant PT    MS 12/01/15 -  Fermin Soria, PT Physical Therapist PT     10/13/17 -  Aissatou Medina, RN Registered Nurse Nurse                    PT Recommendation and Plan  Anticipated Discharge Disposition: home with home health  Planned Therapy Interventions: balance training, bed mobility training, gait training, home exercise program, ROM (Range of Motion), stair training, strengthening, stretching, transfer training  PT Frequency: 2 times/day  Plan of Care Review  Plan Of Care Reviewed With: patient, family  Progress: improving  Outcome Summary/Follow up Plan: Improved tolerance to functional activity this day with an increase in gait distance and continued participation in ther. ex. protocol.          Outcome Measures       12/16/17 1300 12/15/17 1600 12/14/17 1500    How much help from another person do you currently need...    Turning from your back to your side while in flat bed without using bedrails? 4  -MS 3  -MS 3  -JM    Moving from lying on back to sitting on the side of a flat bed without bedrails? 3  -MS 3  -MS 3  -JM    Moving to and from a bed to a chair (including a wheelchair)? 3  -MS 3  -MS 3  -JM    Standing up from a chair using your arms (e.g., wheelchair, bedside chair)? 3  -MS 3  -MS 3  -JM    Climbing 3-5 steps with a railing? 3  -MS 3  -MS 1  -JM    To walk in hospital room? 3  -MS 3  -MS 3  -JM    AM-PAC 6 Clicks Score 19   -MS 18  -MS 16  -JM    Functional Assessment    Outcome Measure Options AM-PAC 6 Clicks Basic Mobility (PT)  -MS AM-PAC 6 Clicks Basic Mobility (PT)  -MS       12/13/17 1600          How much help from another person do you currently need...    Turning from your back to your side while in flat bed without using bedrails? 4  -AR      Moving from lying on back to sitting on the side of a flat bed without bedrails? 4  -AR      Moving to and from a bed to a chair (including a wheelchair)? 4  -AR      Standing up from a chair using your arms (e.g., wheelchair, bedside chair)? 4  -AR      Climbing 3-5 steps with a railing? 3  -AR      To walk in hospital room? 3  -AR      AM-PAC 6 Clicks Score 22  -AR        User Key  (r) = Recorded By, (t) = Taken By, (c) = Cosigned By    Initials Name Provider Type    MARY ANNE Guzman, PTA Physical Therapy Assistant    MS Fermin Soria, PT Physical Therapist    AR Dee Bradley, PT Physical Therapist           Time Calculation:         PT Charges       12/16/17 1305          Time Calculation    Start Time 1039  -MS      Stop Time 1122  -MS      Time Calculation (min) 43 min  -MS      PT Received On 12/16/17  -MS      PT - Next Appointment 12/16/17  -MS        User Key  (r) = Recorded By, (t) = Taken By, (c) = Cosigned By    Initials Name Provider Type    MS Fermin WHITNEY Yang, PT Physical Therapist          Therapy Charges for Today     Code Description Service Date Service Provider Modifiers Qty    61592007784 HC PT THER PROC EA 15 MIN 12/15/2017 Fermin Soria, PT GP 1    57133202946 HC PT THER PROC GROUP 12/15/2017 Fermin Soria, PT GP 1    33430171244 HC PT THER PROC EA 15 MIN 12/16/2017 Fermin Soria, PT GP 1    97469296743 HC PT THER PROC GROUP 12/16/2017 Fermin Soria, PT GP 1          PT G-Codes  Outcome Measure Options: AM-PAC 6 Clicks Basic Mobility (PT)    Fermin Soria, PT  12/16/2017

## 2017-12-16 NOTE — PLAN OF CARE
Problem: Patient Care Overview (Adult)  Goal: Plan of Care Review  Outcome: Ongoing (interventions implemented as appropriate)    12/16/17 0514   Coping/Psychosocial Response Interventions   Plan Of Care Reviewed With patient   Patient Care Overview   Progress improving   Outcome Evaluation   Outcome Summary/Follow up Plan vitals remain stable. Patient pain is controlled with po medications. patient continues to ambulate with one assist and walker. dressing remains dry and intact. discussed bp monitoring due to history of hypertension with patient. Patient also has history of sleep apnea. Patient tolerated wearing mask last night with no complaints of dry mucus membranes. possible D/c home today         Problem: Perioperative Period (Adult)  Goal: Signs and Symptoms of Listed Potential Problems Will be Absent or Manageable (Perioperative Period)  Outcome: Outcome(s) achieved Date Met:  12/16/17    Problem: Knee Replacement, Total (Adult)  Goal: Signs and Symptoms of Listed Potential Problems Will be Absent or Manageable (Knee Replacement, Total)  Outcome: Ongoing (interventions implemented as appropriate)    Problem: Fall Risk (Adult)  Goal: Absence of Falls  Outcome: Ongoing (interventions implemented as appropriate)    Problem: NPPV/CPAP (Adult)  Goal: Signs and Symptoms of Listed Potential Problems Will be Absent or Manageable (NPPV/CPAP)  Outcome: Ongoing (interventions implemented as appropriate)

## 2017-12-16 NOTE — PLAN OF CARE
Problem: Patient Care Overview (Adult)  Goal: Plan of Care Review    12/16/17 7903   Coping/Psychosocial Response Interventions   Plan Of Care Reviewed With patient;family   Patient Care Overview   Progress improving   Outcome Evaluation   Outcome Summary/Follow up Plan Improved tolerance to functional activity this day with an increase in gait distance and continued participation in ther. ex. protocol.

## 2017-12-16 NOTE — PLAN OF CARE
Problem: Patient Care Overview (Adult)  Goal: Plan of Care Review  Outcome: Ongoing (interventions implemented as appropriate)    12/16/17 9540   Coping/Psychosocial Response Interventions   Plan Of Care Reviewed With patient   Patient Care Overview   Progress improving   Outcome Evaluation   Outcome Summary/Follow up Plan pain well controlled with PO pain medication, ambulates with assist x1 and walker, home with home health today, VSS, voiding without difficulty, educated on BP monitoring r/t hx of HTN        Goal: Adult Individualization and Mutuality  Outcome: Ongoing (interventions implemented as appropriate)  Goal: Discharge Needs Assessment  Outcome: Ongoing (interventions implemented as appropriate)    Problem: Perioperative Period (Adult)  Goal: Signs and Symptoms of Listed Potential Problems Will be Absent or Manageable (Perioperative Period)  Outcome: Outcome(s) achieved Date Met:  12/16/17    Problem: Knee Replacement, Total (Adult)  Goal: Signs and Symptoms of Listed Potential Problems Will be Absent or Manageable (Knee Replacement, Total)  Outcome: Ongoing (interventions implemented as appropriate)    Problem: Fall Risk (Adult)  Goal: Identify Related Risk Factors and Signs and Symptoms  Outcome: Outcome(s) achieved Date Met:  12/16/17  Goal: Absence of Falls  Outcome: Ongoing (interventions implemented as appropriate)    Problem: NPPV/CPAP (Adult)  Goal: Signs and Symptoms of Listed Potential Problems Will be Absent or Manageable (NPPV/CPAP)  Outcome: Ongoing (interventions implemented as appropriate)

## 2017-12-16 NOTE — DISCHARGE SUMMARY
Discharge Summary    Patient Name:  Sophie Ace  YOB: 1965  Medical Records Number:  [id number]    Date of Admission:  12/12/2017  Date of Discharge:  12/16/2017    Primary Discharge Diagnosis:  Pain due to internal orthopedic prosthetic devices, implants and grafts, initial encounter [T84.84XA]    Secondary Discharge Diagnosis:    Problem List Items Addressed This Visit        Other    Pain due to internal orthopedic prosthetic devices, implants and grafts, initial encounter - Primary    Relevant Orders    CBC & Differential (Completed)    Basic Metabolic Panel (Completed)    Urinalysis With / Culture If Indicated - Urine, Clean Catch (Completed)    CBC Auto Differential (Completed)          Procedures Performed:  Left revision Total Knee Arthroplasty      Hospital Course:    Sophie Ace is a 52 y.o.  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  Sophie Ace was started on Eliquis 2.5mg po twice daily immediately post-operatively for DVT prophylaxis.  On post-op day 1 the patients dressing was changed and their incision was clean, with no signs of infection and their calf was soft, with no signs of DVT.  The patient progressed well with physical therapy and the patients hemoglobin remained stable. On post-operative day 4 the patient was felt ready for discharge.      Total Knee Joint Replacement Discharge Instructions:    I. ACTIVITIES:  1. Exercises:  ? Complete exercise program as taught by the hospital physical therapist 2 times per day  ? Exercise program will be advanced by the physical therapist  ? During the day be up ambulating every 2 hours (while awake) for short distances  ? Complete the ankle pump exercises at least 10 times per hour (while awake)  ? Elevate legs most of the day the first week post operatively and thereafter elevate legs when in bed and for at least 30 minutes during the day. Caution must be taken to avoid pillow placement  under the bend of the knee as this can led to flexion contractures of the knee.  ? Use cold packs 20-30 minutes approximately 5 times per day. This should be done before and after completing your exercises and at any time you are experiencing pain/ stiffness in your operative extremity.      2. Activities of Daily Living:  ? No tub baths, hot tubs, or swimming pools for 4 weeks  ? May shower and let water run over the incision on post-operative day #5 if no drainage. Do not scrub or rub the incision. Simply let the water run over the incision and pat dry.    II. Restrictions  ? Do not cross legs or kneel  ? Your surgeon will discuss with you when you will be able to drive again.  ? Weight bearing is as tolerated  ? First week stay inside on even terrain. May go up and down stairs one stair at a time utilizing the hand rail  ? After one week, you may venture outside.    III. Precautions:  ? Everyone that comes near you should wash their hands  ? No elective dental, genital-urinary, or colon procedures or surgical procedures for 12 weeks after surgery unless absolutely necessary.  ?  If dental work or surgical procedure is deemed absolutely necessary, you will need to contact your surgeon as you will need to take antibiotics 1 hour prior to any dental work (including teeth cleanings).  ? Please discuss with your surgeon prophylactic antibiotics as the length of time this intervention will be necessary for you varies with each patient’s health history and situation.  ? Avoid sick people. If you must be around someone who is ill, they should wear a mask.  ? Avoid visits to the Emergency Room or Urgent Care unless you are having a life threatening event.   ? If ordered stockings are to be placed on in the morning and removed at night. Monitor the stockings to ensure that any swelling is not causing the stockings to become too tight. In this case, remove stockings immediately.    IV. INCISION CARE:  ? Wash your hands  prior to dressing changes  ? Change the dressing as needed to keep incision clean and dry. Utilize dry gauze and paper tape. Avoid touching the side of the gauze that goes against the incision with your hands.  ? No creams or ointments to the incision  ? May remove dressing once the incision is free of drainage  ? Do not touch or pick at the incision  ? Check incision every day and notify surgeon immediately if any of the following signs or symptoms are noted:  o Increase in redness  o Increase in swelling around the incision and of the entire extremity  o Increase in pain  o Drainage oozing from the incision  o Pulling apart of the edges of the incision  o Increase in overall body temperature (greater than 100.5 degrees)  ? Your surgeon will instruct you regarding suture or staple removal    V. Medications:   1. Anticoagulants: You will be discharged on an anticoagulant. This is a prophylactic medication that helps prevent blood clots during your post-operative period. The type and length of dosage varies based on your individual needs, procedure performed, and surgeon’s preference.  ? While taking the anticoagulant, you should avoid taking any additional aspirin, ibuprofen (Advil or Motrin), Aleve (Naprosyn) or other non-steroidal anti-inflammatory medications.   ? Notify surgeon immediately if any betty bleeding is noted in the urine, stool, emesis, or from the nose or the incision. Blood in the stool will often appear as black rather than red. Blood in urine may appear as pink. Blood in emesis may appear as brown/black like coffee grounds.  ? You will need to apply pressure for longer periods of time to any cuts or abrasions to stop bleeding  ? Avoid alcohol while taking anticoagulants    2. Stool Softeners: You will be at greater risk of constipation after surgery due to being less mobile and the pain medications.   ? Take stool softeners as instructed by your surgeon while on pain medications. Over the counter  Colace 100 mg 1-2 capsules twice daily.   ? If stools become too loose or too frequent, please decreases the dosage or stop the stool softener.  ? If constipation occurs despite use of stool softeners, you are to continue the stool softeners and add a laxative (Milk of Magnesia 1 ounce daily as needed)  ? Drink plenty of fluids, and eat fruits and vegetables during your recovery time    3. Pain Medications utilized after surgery are narcotics and the law requires that the following information be given to all patients that are prescribed narcotics:  ? CLASSIFICATION: Pain medications are called Opioids and are narcotics  ? LEGALITIES: It is illegal to share narcotics with others and to drive within 24 hours of taking narcotics  ? POTENTIAL SIDE EFFECTS: Potential side effects of opioids include: nausea, vomiting, itching, dizziness, drowsiness, dry mouth, constipation, and difficulty urinating.  ? POTENTIAL ADVERSE EFFECTS:   o Opioid tolerance can develop with use of pain medications and this simply means that it requires more and more of the medication to control pain; however, this is seen more in patients that use opioids for longer periods of time.  o Opioid dependence can develop with use of Opioids and this simply means that to stop the medication can cause withdrawal symptoms; however, this is seen with patients that use Opioids for longer periods of time.  o Opioid addiction can develop with use of Opioids and the incidence of this is very unlikely in patients who take the medications as ordered and stop the medications as instructed.  o Opioid overdose can be dangerous, but is unlikely when the medication is taken as ordered and stopped when ordered. It is important not to mix opioids with alcohol or with and type of sedative such as Benadryl as this can lead to over sedation and respiratory difficulty.  ? DOSAGE:   o Pain medications will need to be taken consistently for the first week to decrease pain  and promote adequate pain relief and participation in physical therapy.  o After the initial surgical pain begins to resolve, you may begin to decrease the pain medication. By the end of 6-8 weeks, you should be off of pain medications.  o Refills will not be given by the office during evening hours, on weekends, or after 6-8 weeks post-op.  o To seek refills on pain medications during the initial 6 week post-operative period, you must call the office 48 hours in advance to request the refill. The office will then notify you when to  the prescription. DO NOT wait until you are out of the medication to request a refill.    V. FOLLOW-UP VISITS:  ? You will need to follow up in the office with your surgeon in 3 weeks. Please call this number [phone] or [phone] to schedule this appointment.  If you have any concerns or suspected complications prior to your follow up visit, please call your surgeons office. Do not wait until your appointment time if you suspect complications. These will need to be addressed in the office promptly.      Discharge Medications:     1) Percocet 10/325  1-2 po q 4-6 hours for pain control  2) Elquis 2.5mg po twice daily for 2 weeks, then once daily for 4 weeks.    CC:Kim Barr MD:Bhanu Baxter MD

## 2017-12-16 NOTE — DISCHARGE INSTR - ACTIVITY
I. ACTIVITIES:  1. Exercises:  · Complete exercise program as taught by the hospital physical therapist 2 times per day  · Exercise program will be advanced by the physical therapist  · During the day be up ambulating every 2 hours (while awake) for short distances  · Complete the ankle pump exercises at least 10 times per hour (while awake)  · Elevate legs most of the day the first week post operatively and thereafter elevate legs when in bed and for at least 30 minutes during the day. Caution must be taken to avoid pillow placement under the bend of the knee as this can led to flexion contractures of the knee.  · Use cold packs 20-30 minutes approximately 5 times per day. This should be done before and after completing your exercises and at any time you are experiencing pain/ stiffness in your operative extremity.        2. Activities of Daily Living:  · No tub baths, hot tubs, or swimming pools for 4 weeks  · May shower and let water run over the incision on post-operative day #5 if no drainage. Do not scrub or rub the incision. Simply let the water run over the incision and pat dry.     II. Restrictions  · Do not cross legs or kneel  · Your surgeon will discuss with you when you will be able to drive again.  · Weight bearing is as tolerated  · First week stay inside on even terrain. May go up and down stairs one stair at a time utilizing the hand rail  · After one week, you may venture outside.     III. Precautions:  · Everyone that comes near you should wash their hands  · No elective dental, genital-urinary, or colon procedures or surgical procedures for 12 weeks after surgery unless absolutely necessary.  ·  If dental work or surgical procedure is deemed absolutely necessary, you will need to contact your surgeon as you will need to take antibiotics 1 hour prior to any dental work (including teeth cleanings).  · Please discuss with your surgeon prophylactic antibiotics as the length of time this intervention  will be necessary for you varies with each patient’s health history and situation.  · Avoid sick people. If you must be around someone who is ill, they should wear a mask.  · Avoid visits to the Emergency Room or Urgent Care unless you are having a life threatening event.   · If ordered stockings are to be placed on in the morning and removed at night. Monitor the stockings to ensure that any swelling is not causing the stockings to become too tight. In this case, remove stockings immediately.     IV. INCISION CARE:  · Wash your hands prior to dressing changes  · Change the dressing as needed to keep incision clean and dry. Utilize dry gauze and paper tape. Avoid touching the side of the gauze that goes against the incision with your hands.  · No creams or ointments to the incision  · May remove dressing once the incision is free of drainage  · Do not touch or pick at the incision  · Check incision every day and notify surgeon immediately if any of the following signs or symptoms are noted:  ¨ Increase in redness  ¨ Increase in swelling around the incision and of the entire extremity  ¨ Increase in pain  ¨ Drainage oozing from the incision  ¨ Pulling apart of the edges of the incision  ¨ Increase in overall body temperature (greater than 100.5 degrees)  · Your surgeon will instruct you regarding suture or staple removal     V. Medications:   1. Anticoagulants: You will be discharged on an anticoagulant. This is a prophylactic medication that helps prevent blood clots during your post-operative period. The type and length of dosage varies based on your individual needs, procedure performed, and surgeon’s preference.  · While taking the anticoagulant, you should avoid taking any additional aspirin, ibuprofen (Advil or Motrin), Aleve (Naprosyn) or other non-steroidal anti-inflammatory medications.   · Notify surgeon immediately if any betty bleeding is noted in the urine, stool, emesis, or from the nose or the  incision. Blood in the stool will often appear as black rather than red. Blood in urine may appear as pink. Blood in emesis may appear as brown/black like coffee grounds.  · You will need to apply pressure for longer periods of time to any cuts or abrasions to stop bleeding  · Avoid alcohol while taking anticoagulants     2. Stool Softeners: You will be at greater risk of constipation after surgery due to being less mobile and the pain medications.   · Take stool softeners as instructed by your surgeon while on pain medications. Over the counter Colace 100 mg 1-2 capsules twice daily.   · If stools become too loose or too frequent, please decreases the dosage or stop the stool softener.  · If constipation occurs despite use of stool softeners, you are to continue the stool softeners and add a laxative (Milk of Magnesia 1 ounce daily as needed)  · Drink plenty of fluids, and eat fruits and vegetables during your recovery time     3. Pain Medications utilized after surgery are narcotics and the law requires that the following information be given to all patients that are prescribed narcotics:  · CLASSIFICATION: Pain medications are called Opioids and are narcotics  · LEGALITIES: It is illegal to share narcotics with others and to drive within 24 hours of taking narcotics  · POTENTIAL SIDE EFFECTS: Potential side effects of opioids include: nausea, vomiting, itching, dizziness, drowsiness, dry mouth, constipation, and difficulty urinating.  · POTENTIAL ADVERSE EFFECTS:   ¨ Opioid tolerance can develop with use of pain medications and this simply means that it requires more and more of the medication to control pain; however, this is seen more in patients that use opioids for longer periods of time.  ¨ Opioid dependence can develop with use of Opioids and this simply means that to stop the medication can cause withdrawal symptoms; however, this is seen with patients that use Opioids for longer periods of time.  ¨ Opioid  addiction can develop with use of Opioids and the incidence of this is very unlikely in patients who take the medications as ordered and stop the medications as instructed.  ¨ Opioid overdose can be dangerous, but is unlikely when the medication is taken as ordered and stopped when ordered. It is important not to mix opioids with alcohol or with and type of sedative such as Benadryl as this can lead to over sedation and respiratory difficulty.  · DOSAGE:   ¨ Pain medications will need to be taken consistently for the first week to decrease pain and promote adequate pain relief and participation in physical therapy.  ¨ After the initial surgical pain begins to resolve, you may begin to decrease the pain medication. By the end of 6-8 weeks, you should be off of pain medications.  ¨ Refills will not be given by the office during evening hours, on weekends, or after 6-8 weeks post-op.  ¨ To seek refills on pain medications during the initial 6 week post-operative period, you must call the office 48 hours in advance to request the refill. The office will then notify you when to  the prescription. DO NOT wait until you are out of the medication to request a refill.     V. FOLLOW-UP VISITS:  · You will need to follow up in the office with your surgeon in 3 weeks. Please call this number [phone] or [phone] to schedule this appointment.  If you have any concerns or suspected complications prior to your follow up visit, please call your surgeons office. Do not wait until your appointment time if you suspect complications. These will need to be addressed in the office promptly.        Discharge Medications:     1) Percocet 10/325  1-2 po q 4-6 hours for pain control  2) Elquis 2.5mg po twice daily for 2 weeks, then once daily for 4 weeks.

## 2017-12-18 NOTE — PAYOR COMM NOTE
"Alon Rodriguez (52 y.o. Female)     Date of Birth Social Security Number Address Home Phone MRN    1965  [address] [place] [id number] [phone] [id number]    [place] Marital Status          Cheondoism        Admission Date Admission Type Admitting Provider Attending Provider Department, Room/Bed    12/12/17 Elective Bhanu Baxter MD  [address], P788/1    Discharge Date Discharge Disposition Discharge Destination        12/16/2017 Home or Self Care             Attending Provider: (none)    Allergies:  Erythromycin, Morphine, Penicillins, Sulfa Antibiotics, Tetracyclines & Related    Isolation:  None   Infection:  None   Code Status:  Prior    Ht:  162.6 cm (64.02\")   Wt:  113 kg (250 lb)    Admission Cmt:  None   Principal Problem:  None                Active Insurance as of 12/12/2017     Primary Coverage     Payor Plan Insurance Group Employer/Plan Group    [employer]HE[employer] BLUE CROSS ANTHEM BLUE CROSS BLUE SHIELD PPO [id number]     Payor Plan Address Payor Plan Phone Number Effective From Effective To    [address] [phone] 6/1/2014     [place] [address]       Subscriber Name Subscriber Birth Date Member ID       ALON RODRIGUEZ 1965 [id number]                 Emergency Contacts      (Rel.) Home Phone Work Phone Mobile Phone    Angel Rodriguez (Spouse) [phone] -- [phone]               Discharge Summary      Bhanu Baxter MD at 12/16/2017  2:35 PM          Discharge Summary    Patient Name:  Alon Rodriguez  YOB: 1965  Medical Records Number:  [id number]    Date of Admission:  12/12/2017  Date of Discharge:  12/16/2017    Primary Discharge Diagnosis:  Pain due to internal orthopedic prosthetic devices, implants and grafts, initial encounter [T84.84XA]    Secondary Discharge Diagnosis:    Problem List Items Addressed This Visit        Other    Pain due to internal orthopedic prosthetic devices, implants and grafts, initial " encounter - Primary    Relevant Orders    CBC & Differential (Completed)    Basic Metabolic Panel (Completed)    Urinalysis With / Culture If Indicated - Urine, Clean Catch (Completed)    CBC Auto Differential (Completed)          Procedures Performed:  Left revision Total Knee Arthroplasty      Hospital Course:    Sophie Ace is a 52 y.o.  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  Sophie Ace was started on Eliquis 2.5mg po twice daily immediately post-operatively for DVT prophylaxis.  On post-op day 1 the patients dressing was changed and their incision was clean, with no signs of infection and their calf was soft, with no signs of DVT.  The patient progressed well with physical therapy and the patients hemoglobin remained stable. On post-operative day 4 the patient was felt ready for discharge.      Total Knee Joint Replacement Discharge Instructions:    I. ACTIVITIES:  1. Exercises:  ? Complete exercise program as taught by the hospital physical therapist 2 times per day  ? Exercise program will be advanced by the physical therapist  ? During the day be up ambulating every 2 hours (while awake) for short distances  ? Complete the ankle pump exercises at least 10 times per hour (while awake)  ? Elevate legs most of the day the first week post operatively and thereafter elevate legs when in bed and for at least 30 minutes during the day. Caution must be taken to avoid pillow placement under the bend of the knee as this can led to flexion contractures of the knee.  ? Use cold packs 20-30 minutes approximately 5 times per day. This should be done before and after completing your exercises and at any time you are experiencing pain/ stiffness in your operative extremity.      2. Activities of Daily Living:  ? No tub baths, hot tubs, or swimming pools for 4 weeks  ? May shower and let water run over the incision on post-operative day #5 if no drainage. Do not scrub  or rub the incision. Simply let the water run over the incision and pat dry.    II. Restrictions  ? Do not cross legs or kneel  ? Your surgeon will discuss with you when you will be able to drive again.  ? Weight bearing is as tolerated  ? First week stay inside on even terrain. May go up and down stairs one stair at a time utilizing the hand rail  ? After one week, you may venture outside.    III. Precautions:  ? Everyone that comes near you should wash their hands  ? No elective dental, genital-urinary, or colon procedures or surgical procedures for 12 weeks after surgery unless absolutely necessary.  ?  If dental work or surgical procedure is deemed absolutely necessary, you will need to contact your surgeon as you will need to take antibiotics 1 hour prior to any dental work (including teeth cleanings).  ? Please discuss with your surgeon prophylactic antibiotics as the length of time this intervention will be necessary for you varies with each patient’s health history and situation.  ? Avoid sick people. If you must be around someone who is ill, they should wear a mask.  ? Avoid visits to the Emergency Room or Urgent Care unless you are having a life threatening event.   ? If ordered stockings are to be placed on in the morning and removed at night. Monitor the stockings to ensure that any swelling is not causing the stockings to become too tight. In this case, remove stockings immediately.    IV. INCISION CARE:  ? Wash your hands prior to dressing changes  ? Change the dressing as needed to keep incision clean and dry. Utilize dry gauze and paper tape. Avoid touching the side of the gauze that goes against the incision with your hands.  ? No creams or ointments to the incision  ? May remove dressing once the incision is free of drainage  ? Do not touch or pick at the incision  ? Check incision every day and notify surgeon immediately if any of the following signs or symptoms are noted:  o Increase in  redness  o Increase in swelling around the incision and of the entire extremity  o Increase in pain  o Drainage oozing from the incision  o Pulling apart of the edges of the incision  o Increase in overall body temperature (greater than 100.5 degrees)  ? Your surgeon will instruct you regarding suture or staple removal    V. Medications:   1. Anticoagulants: You will be discharged on an anticoagulant. This is a prophylactic medication that helps prevent blood clots during your post-operative period. The type and length of dosage varies based on your individual needs, procedure performed, and surgeon’s preference.  ? While taking the anticoagulant, you should avoid taking any additional aspirin, ibuprofen (Advil or Motrin), Aleve (Naprosyn) or other non-steroidal anti-inflammatory medications.   ? Notify surgeon immediately if any betty bleeding is noted in the urine, stool, emesis, or from the nose or the incision. Blood in the stool will often appear as black rather than red. Blood in urine may appear as pink. Blood in emesis may appear as brown/black like coffee grounds.  ? You will need to apply pressure for longer periods of time to any cuts or abrasions to stop bleeding  ? Avoid alcohol while taking anticoagulants    2. Stool Softeners: You will be at greater risk of constipation after surgery due to being less mobile and the pain medications.   ? Take stool softeners as instructed by your surgeon while on pain medications. Over the counter Colace 100 mg 1-2 capsules twice daily.   ? If stools become too loose or too frequent, please decreases the dosage or stop the stool softener.  ? If constipation occurs despite use of stool softeners, you are to continue the stool softeners and add a laxative (Milk of Magnesia 1 ounce daily as needed)  ? Drink plenty of fluids, and eat fruits and vegetables during your recovery time    3. Pain Medications utilized after surgery are narcotics and the law requires that the  following information be given to all patients that are prescribed narcotics:  ? CLASSIFICATION: Pain medications are called Opioids and are narcotics  ? LEGALITIES: It is illegal to share narcotics with others and to drive within 24 hours of taking narcotics  ? POTENTIAL SIDE EFFECTS: Potential side effects of opioids include: nausea, vomiting, itching, dizziness, drowsiness, dry mouth, constipation, and difficulty urinating.  ? POTENTIAL ADVERSE EFFECTS:   o Opioid tolerance can develop with use of pain medications and this simply means that it requires more and more of the medication to control pain; however, this is seen more in patients that use opioids for longer periods of time.  o Opioid dependence can develop with use of Opioids and this simply means that to stop the medication can cause withdrawal symptoms; however, this is seen with patients that use Opioids for longer periods of time.  o Opioid addiction can develop with use of Opioids and the incidence of this is very unlikely in patients who take the medications as ordered and stop the medications as instructed.  o Opioid overdose can be dangerous, but is unlikely when the medication is taken as ordered and stopped when ordered. It is important not to mix opioids with alcohol or with and type of sedative such as Benadryl as this can lead to over sedation and respiratory difficulty.  ? DOSAGE:   o Pain medications will need to be taken consistently for the first week to decrease pain and promote adequate pain relief and participation in physical therapy.  o After the initial surgical pain begins to resolve, you may begin to decrease the pain medication. By the end of 6-8 weeks, you should be off of pain medications.  o Refills will not be given by the office during evening hours, on weekends, or after 6-8 weeks post-op.  o To seek refills on pain medications during the initial 6 week post-operative period, you must call the office 48 hours in advance to  request the refill. The office will then notify you when to  the prescription. DO NOT wait until you are out of the medication to request a refill.    V. FOLLOW-UP VISITS:  ? You will need to follow up in the office with your surgeon in 3 weeks. Please call this number [phone] or [phone] to schedule this appointment.  If you have any concerns or suspected complications prior to your follow up visit, please call your surgeons office. Do not wait until your appointment time if you suspect complications. These will need to be addressed in the office promptly.      Discharge Medications:     1) Percocet 10/325  1-2 po q 4-6 hours for pain control  2) Elquis 2.5mg po twice daily for 2 weeks, then once daily for 4 weeks.    CC:Kim Barr MD:Bhanu Baxter MD         Electronically signed by Bhanu Baxter MD at 12/16/2017  2:38 PM

## 2017-12-18 NOTE — PROGRESS NOTES
Case Management Discharge Note    Final Note: Home with [facility]. No new needs identified.     Discharge Placement     Facility/Agency Request Status Selected? Address Phone Number Fax Number    [facility] Accepted    Yes [address] [address] [phone]        Other:  (Private car)    Discharge Codes: 06  Discharged/transferred to home under care of organized home health service organization in anticipation of skilled care

## 2018-07-02 ENCOUNTER — APPOINTMENT (OUTPATIENT)
Dept: PREADMISSION TESTING | Facility: HOSPITAL | Age: 53
End: 2018-07-02

## 2018-07-02 VITALS
HEIGHT: 64 IN | SYSTOLIC BLOOD PRESSURE: 128 MMHG | DIASTOLIC BLOOD PRESSURE: 81 MMHG | HEART RATE: 80 BPM | RESPIRATION RATE: 16 BRPM | OXYGEN SATURATION: 98 % | TEMPERATURE: 97.1 F | WEIGHT: 245 LBS | BODY MASS INDEX: 41.83 KG/M2

## 2018-07-02 LAB
ANION GAP SERPL CALCULATED.3IONS-SCNC: 12.3 MMOL/L
BILIRUB UR QL STRIP: NEGATIVE
BUN BLD-MCNC: 16 MG/DL (ref 6–20)
BUN/CREAT SERPL: 23.2 (ref 7–25)
CALCIUM SPEC-SCNC: 9.7 MG/DL (ref 8.6–10.5)
CHLORIDE SERPL-SCNC: 103 MMOL/L (ref 98–107)
CLARITY UR: CLEAR
CO2 SERPL-SCNC: 26.7 MMOL/L (ref 22–29)
COLOR UR: YELLOW
CREAT BLD-MCNC: 0.69 MG/DL (ref 0.57–1)
DEPRECATED RDW RBC AUTO: 46 FL (ref 37–54)
ERYTHROCYTE [DISTWIDTH] IN BLOOD BY AUTOMATED COUNT: 13.6 % (ref 11.7–13)
GFR SERPL CREATININE-BSD FRML MDRD: 89 ML/MIN/1.73
GLUCOSE BLD-MCNC: 103 MG/DL (ref 65–99)
GLUCOSE UR STRIP-MCNC: NEGATIVE MG/DL
HCT VFR BLD AUTO: 39.6 % (ref 35.6–45.5)
HGB BLD-MCNC: 12.5 G/DL (ref 11.9–15.5)
HGB UR QL STRIP.AUTO: NEGATIVE
KETONES UR QL STRIP: NEGATIVE
LEUKOCYTE ESTERASE UR QL STRIP.AUTO: NEGATIVE
MCH RBC QN AUTO: 29.1 PG (ref 26.9–32)
MCHC RBC AUTO-ENTMCNC: 31.6 G/DL (ref 32.4–36.3)
MCV RBC AUTO: 92.3 FL (ref 80.5–98.2)
NITRITE UR QL STRIP: NEGATIVE
PH UR STRIP.AUTO: >=9 [PH] (ref 5–8)
PLATELET # BLD AUTO: 219 10*3/MM3 (ref 140–500)
PMV BLD AUTO: 11.1 FL (ref 6–12)
POTASSIUM BLD-SCNC: 4 MMOL/L (ref 3.5–5.2)
PROT UR QL STRIP: NEGATIVE
RBC # BLD AUTO: 4.29 10*6/MM3 (ref 3.9–5.2)
SODIUM BLD-SCNC: 142 MMOL/L (ref 136–145)
SP GR UR STRIP: 1.02 (ref 1–1.03)
UROBILINOGEN UR QL STRIP: ABNORMAL
WBC NRBC COR # BLD: 4.22 10*3/MM3 (ref 4.5–10.7)

## 2018-07-02 PROCEDURE — 80048 BASIC METABOLIC PNL TOTAL CA: CPT | Performed by: ORTHOPAEDIC SURGERY

## 2018-07-02 PROCEDURE — 81003 URINALYSIS AUTO W/O SCOPE: CPT | Performed by: ORTHOPAEDIC SURGERY

## 2018-07-02 PROCEDURE — 93010 ELECTROCARDIOGRAM REPORT: CPT | Performed by: INTERNAL MEDICINE

## 2018-07-02 PROCEDURE — 93005 ELECTROCARDIOGRAM TRACING: CPT

## 2018-07-02 PROCEDURE — 36415 COLL VENOUS BLD VENIPUNCTURE: CPT

## 2018-07-02 PROCEDURE — 85027 COMPLETE CBC AUTOMATED: CPT | Performed by: ORTHOPAEDIC SURGERY

## 2018-07-02 RX ORDER — CELECOXIB 200 MG/1
200 CAPSULE ORAL EVERY MORNING
COMMUNITY
End: 2018-07-12 | Stop reason: HOSPADM

## 2018-07-02 RX ORDER — OXYCODONE AND ACETAMINOPHEN 10; 325 MG/1; MG/1
1 TABLET ORAL EVERY 6 HOURS PRN
COMMUNITY
End: 2018-07-12 | Stop reason: HOSPADM

## 2018-07-02 NOTE — DISCHARGE INSTRUCTIONS
Take the following medications the morning of surgery with a small sip of water:    NEXIUM    General Instructions:  • Do not eat solid food after midnight the night before surgery.  • You may drink clear liquids day of surgery but must stop at least one hour before your hospital arrival time.  (1230 PM )  • It is beneficial for you to have a clear drink that contains carbohydrates the day of surgery.  We suggest a 12 to 20 ounce bottle of Gatorade or Powerade for non-diabetic patients     Clear liquids are liquids you can see through.  Nothing red in color.     Plain water                               Sports drinks  Sodas                                   Gelatin (Jell-O)  Fruit juices without pulp such as white grape juice and apple juice  Popsicles that contain no fruit or yogurt  Tea or coffee (no cream or milk added)  Gatorade / Powerade  G2 / Powerade Zero    • Patients who avoid smoking, chewing tobacco and alcohol for 4 weeks prior to surgery have a reduced risk of post-operative complications.  Quit smoking as many days before surgery as you can.  • Do not smoke, use chewing tobacco or drink alcohol the day of surgery.   • If applicable bring your C-PAP/ BI-PAP machine.  • Bring any papers given to you in the doctor’s office.  • Wear clean comfortable clothes and socks.  • Do not wear contact lenses or make-up.  Bring a case for your glasses.   • Bring crutches or walker if applicable.  • Remove all piercings.  Leave jewelry and any other valuables at home.  • Hair extensions with metal clips must be removed prior to surgery.  • The Pre-Admission Testing nurse will instruct you to bring medications if unable to obtain an accurate list in Pre-Admission Testing.  • REPORT TO MAIN SURGERY ON 7-9-2018 AT 1330            Preventing a Surgical Site Infection:  • For 2 to 3 days before surgery, avoid shaving with a razor because the razor can irritate skin and make it easier to develop an infection.    • Any  areas of open skin can increase the risk of a post-operative wound infection by allowing bacteria to enter and travel throughout the body.  Notify your surgeon if you have any skin wounds / rashes even if it is not near the expected surgical site.  The area will need assessed to determine if surgery should be delayed until it is healed.  • The night prior to surgery sleep in a clean bed with clean clothing.  Do not allow pets to sleep with you.  • Shower on the morning of surgery using a fresh bar of anti-bacterial soap (such as Dial) and clean washcloth.  Dry with a clean towel and dress in clean clothing.  • Ask your surgeon if you will be receiving antibiotics prior to surgery.  • Make sure you, your family, and all healthcare providers clean their hands with soap and water or an alcohol based hand  before caring for you or your wound.    Day of surgery:  Upon arrival, a Pre-op nurse and Anesthesiologist will review your health history, obtain vital signs, and answer questions you may have.  The only belongings needed at this time will be your home medications and if applicable your C-PAP/BI-PAP machine.  If you are staying overnight your family can leave the rest of your belongings in the car and bring them to your room later.  A Pre-op nurse will start an IV and you may receive medication in preparation for surgery, including something to help you relax.  Your family will be able to see you in the Pre-op area.  While you are in surgery your family should notify the waiting room  if they leave the waiting room area and provide a contact phone number.    Please be aware that surgery does come with discomfort.  We want to make every effort to control your discomfort so please discuss any uncontrolled symptoms with your nurse.   Your doctor will most likely have prescribed pain medications.      .    If you are staying overnight following surgery, you will be transported to your hospital room  following the recovery period.  [facility] has all private rooms.    You have received a list of surgical assistants for your reference.  If you have any questions please call Pre-Admission Testing at [phone].  Deductibles and co-payments are collected on the day of service. Please be prepared to pay the required co-pay, deductible or deposit on the day of service as defined by your plan.

## 2018-07-09 ENCOUNTER — ANESTHESIA EVENT (OUTPATIENT)
Dept: PERIOP | Facility: HOSPITAL | Age: 53
End: 2018-07-09

## 2018-07-09 ENCOUNTER — ANESTHESIA (OUTPATIENT)
Dept: PERIOP | Facility: HOSPITAL | Age: 53
End: 2018-07-09

## 2018-07-09 ENCOUNTER — APPOINTMENT (OUTPATIENT)
Dept: GENERAL RADIOLOGY | Facility: HOSPITAL | Age: 53
End: 2018-07-09
Attending: ORTHOPAEDIC SURGERY

## 2018-07-09 ENCOUNTER — APPOINTMENT (OUTPATIENT)
Dept: GENERAL RADIOLOGY | Facility: HOSPITAL | Age: 53
End: 2018-07-09

## 2018-07-09 ENCOUNTER — HOSPITAL ENCOUNTER (INPATIENT)
Facility: HOSPITAL | Age: 53
LOS: 1 days | Discharge: HOME HEALTH | End: 2018-07-11
Attending: ORTHOPAEDIC SURGERY | Admitting: ORTHOPAEDIC SURGERY

## 2018-07-09 DIAGNOSIS — R26.2 DIFFICULTY WALKING: Primary | ICD-10-CM

## 2018-07-09 PROBLEM — M23.52: Status: ACTIVE | Noted: 2018-07-09

## 2018-07-09 LAB
B-HCG UR QL: NEGATIVE
INTERNAL NEGATIVE CONTROL: NEGATIVE
INTERNAL POSITIVE CONTROL: POSITIVE
Lab: NORMAL

## 2018-07-09 PROCEDURE — G0378 HOSPITAL OBSERVATION PER HR: HCPCS

## 2018-07-09 PROCEDURE — 81025 URINE PREGNANCY TEST: CPT | Performed by: ANESTHESIOLOGY

## 2018-07-09 PROCEDURE — C1776 JOINT DEVICE (IMPLANTABLE): HCPCS | Performed by: ORTHOPAEDIC SURGERY

## 2018-07-09 PROCEDURE — 25010000002 MIDAZOLAM PER 1 MG: Performed by: ANESTHESIOLOGY

## 2018-07-09 PROCEDURE — 25010000002 PROPOFOL 10 MG/ML EMULSION: Performed by: ANESTHESIOLOGY

## 2018-07-09 PROCEDURE — 25010000002 HYDROMORPHONE PER 4 MG: Performed by: ANESTHESIOLOGY

## 2018-07-09 PROCEDURE — 25010000002 FENTANYL CITRATE (PF) 100 MCG/2ML SOLUTION: Performed by: ANESTHESIOLOGY

## 2018-07-09 PROCEDURE — L1830 KO IMMOB CANVAS LONG PRE OTS: HCPCS | Performed by: ORTHOPAEDIC SURGERY

## 2018-07-09 PROCEDURE — 25010000003 CEFAZOLIN IN DEXTROSE 2-4 GM/100ML-% SOLUTION: Performed by: ORTHOPAEDIC SURGERY

## 2018-07-09 PROCEDURE — 25010000002 HYDROMORPHONE PER 4 MG: Performed by: ORTHOPAEDIC SURGERY

## 2018-07-09 PROCEDURE — C1713 ANCHOR/SCREW BN/BN,TIS/BN: HCPCS | Performed by: ORTHOPAEDIC SURGERY

## 2018-07-09 PROCEDURE — 0SRU0J9 REPLACEMENT OF LEFT KNEE JOINT, FEMORAL SURFACE WITH SYNTHETIC SUBSTITUTE, CEMENTED, OPEN APPROACH: ICD-10-PCS | Performed by: ORTHOPAEDIC SURGERY

## 2018-07-09 PROCEDURE — 25010000002 ONDANSETRON PER 1 MG: Performed by: ANESTHESIOLOGY

## 2018-07-09 PROCEDURE — 25010000002 ROPIVACAINE PER 1 MG: Performed by: ANESTHESIOLOGY

## 2018-07-09 PROCEDURE — 0SUW09Z SUPPLEMENT LEFT KNEE JOINT, TIBIAL SURFACE WITH LINER, OPEN APPROACH: ICD-10-PCS | Performed by: ORTHOPAEDIC SURGERY

## 2018-07-09 PROCEDURE — 0SPD09Z REMOVAL OF LINER FROM LEFT KNEE JOINT, OPEN APPROACH: ICD-10-PCS | Performed by: ORTHOPAEDIC SURGERY

## 2018-07-09 PROCEDURE — 94799 UNLISTED PULMONARY SVC/PX: CPT

## 2018-07-09 PROCEDURE — 0SPU0JZ REMOVAL OF SYNTHETIC SUBSTITUTE FROM LEFT KNEE JOINT, FEMORAL SURFACE, OPEN APPROACH: ICD-10-PCS | Performed by: ORTHOPAEDIC SURGERY

## 2018-07-09 PROCEDURE — 73560 X-RAY EXAM OF KNEE 1 OR 2: CPT

## 2018-07-09 PROCEDURE — 25010000002 DEXAMETHASONE PER 1 MG: Performed by: ANESTHESIOLOGY

## 2018-07-09 PROCEDURE — 25010000002 DIPHENHYDRAMINE PER 50 MG: Performed by: ANESTHESIOLOGY

## 2018-07-09 PROCEDURE — 25010000002 KETOROLAC TROMETHAMINE PER 15 MG: Performed by: ANESTHESIOLOGY

## 2018-07-09 DEVICE — IMPLANTABLE DEVICE: Type: IMPLANTABLE DEVICE | Site: KNEE | Status: FUNCTIONAL

## 2018-07-09 DEVICE — BAR TIB ASCENT/MAXIM PRI INTERLOK: Type: IMPLANTABLE DEVICE | Site: KNEE | Status: FUNCTIONAL

## 2018-07-09 DEVICE — CMT BONE PALACOS RPLSG W/GENT HI/VISC 1X40: Type: IMPLANTABLE DEVICE | Site: KNEE | Status: FUNCTIONAL

## 2018-07-09 RX ORDER — PANTOPRAZOLE SODIUM 40 MG/1
40 TABLET, DELAYED RELEASE ORAL EVERY MORNING
Status: DISCONTINUED | OUTPATIENT
Start: 2018-07-10 | End: 2018-07-12 | Stop reason: HOSPADM

## 2018-07-09 RX ORDER — FENTANYL CITRATE 50 UG/ML
50 INJECTION, SOLUTION INTRAMUSCULAR; INTRAVENOUS
Status: DISCONTINUED | OUTPATIENT
Start: 2018-07-09 | End: 2018-07-09 | Stop reason: HOSPADM

## 2018-07-09 RX ORDER — ROPIVACAINE HYDROCHLORIDE 5 MG/ML
INJECTION, SOLUTION EPIDURAL; INFILTRATION; PERINEURAL AS NEEDED
Status: DISCONTINUED | OUTPATIENT
Start: 2018-07-09 | End: 2018-07-09 | Stop reason: SURG

## 2018-07-09 RX ORDER — SODIUM CHLORIDE, SODIUM LACTATE, POTASSIUM CHLORIDE, CALCIUM CHLORIDE 600; 310; 30; 20 MG/100ML; MG/100ML; MG/100ML; MG/100ML
9 INJECTION, SOLUTION INTRAVENOUS CONTINUOUS
Status: DISCONTINUED | OUTPATIENT
Start: 2018-07-09 | End: 2018-07-09 | Stop reason: HOSPADM

## 2018-07-09 RX ORDER — NALOXONE HCL 0.4 MG/ML
0.2 VIAL (ML) INJECTION AS NEEDED
Status: DISCONTINUED | OUTPATIENT
Start: 2018-07-09 | End: 2018-07-09 | Stop reason: HOSPADM

## 2018-07-09 RX ORDER — HYDROMORPHONE HCL 110MG/55ML
PATIENT CONTROLLED ANALGESIA SYRINGE INTRAVENOUS AS NEEDED
Status: DISCONTINUED | OUTPATIENT
Start: 2018-07-09 | End: 2018-07-09 | Stop reason: SURG

## 2018-07-09 RX ORDER — DULOXETIN HYDROCHLORIDE 60 MG/1
120 CAPSULE, DELAYED RELEASE ORAL EVERY MORNING
Status: DISCONTINUED | OUTPATIENT
Start: 2018-07-10 | End: 2018-07-12 | Stop reason: HOSPADM

## 2018-07-09 RX ORDER — PROMETHAZINE HYDROCHLORIDE 25 MG/1
25 TABLET ORAL ONCE AS NEEDED
Status: DISCONTINUED | OUTPATIENT
Start: 2018-07-09 | End: 2018-07-09 | Stop reason: HOSPADM

## 2018-07-09 RX ORDER — KETOROLAC TROMETHAMINE 30 MG/ML
INJECTION, SOLUTION INTRAMUSCULAR; INTRAVENOUS AS NEEDED
Status: DISCONTINUED | OUTPATIENT
Start: 2018-07-09 | End: 2018-07-09 | Stop reason: SURG

## 2018-07-09 RX ORDER — SENNA AND DOCUSATE SODIUM 50; 8.6 MG/1; MG/1
2 TABLET, FILM COATED ORAL NIGHTLY
Status: DISCONTINUED | OUTPATIENT
Start: 2018-07-09 | End: 2018-07-12 | Stop reason: HOSPADM

## 2018-07-09 RX ORDER — HYDROMORPHONE HYDROCHLORIDE 1 MG/ML
0.5 INJECTION, SOLUTION INTRAMUSCULAR; INTRAVENOUS; SUBCUTANEOUS
Status: DISCONTINUED | OUTPATIENT
Start: 2018-07-09 | End: 2018-07-12 | Stop reason: HOSPADM

## 2018-07-09 RX ORDER — CALCIUM CARBONATE 500(1250)
500 TABLET ORAL DAILY
Status: DISCONTINUED | OUTPATIENT
Start: 2018-07-10 | End: 2018-07-12 | Stop reason: HOSPADM

## 2018-07-09 RX ORDER — NALOXONE HCL 0.4 MG/ML
0.1 VIAL (ML) INJECTION
Status: DISCONTINUED | OUTPATIENT
Start: 2018-07-09 | End: 2018-07-12 | Stop reason: HOSPADM

## 2018-07-09 RX ORDER — EPHEDRINE SULFATE 50 MG/ML
5 INJECTION, SOLUTION INTRAVENOUS ONCE AS NEEDED
Status: DISCONTINUED | OUTPATIENT
Start: 2018-07-09 | End: 2018-07-09 | Stop reason: HOSPADM

## 2018-07-09 RX ORDER — HYDROMORPHONE HYDROCHLORIDE 1 MG/ML
0.5 INJECTION, SOLUTION INTRAMUSCULAR; INTRAVENOUS; SUBCUTANEOUS
Status: DISCONTINUED | OUTPATIENT
Start: 2018-07-09 | End: 2018-07-09 | Stop reason: HOSPADM

## 2018-07-09 RX ORDER — LIDOCAINE HYDROCHLORIDE 20 MG/ML
INJECTION, SOLUTION INFILTRATION; PERINEURAL AS NEEDED
Status: DISCONTINUED | OUTPATIENT
Start: 2018-07-09 | End: 2018-07-09 | Stop reason: SURG

## 2018-07-09 RX ORDER — SODIUM CHLORIDE 0.9 % (FLUSH) 0.9 %
1-10 SYRINGE (ML) INJECTION AS NEEDED
Status: DISCONTINUED | OUTPATIENT
Start: 2018-07-09 | End: 2018-07-09 | Stop reason: HOSPADM

## 2018-07-09 RX ORDER — LABETALOL HYDROCHLORIDE 5 MG/ML
5 INJECTION, SOLUTION INTRAVENOUS
Status: DISCONTINUED | OUTPATIENT
Start: 2018-07-09 | End: 2018-07-09 | Stop reason: HOSPADM

## 2018-07-09 RX ORDER — CEFAZOLIN SODIUM 2 G/100ML
2 INJECTION, SOLUTION INTRAVENOUS EVERY 8 HOURS
Status: COMPLETED | OUTPATIENT
Start: 2018-07-09 | End: 2018-07-10

## 2018-07-09 RX ORDER — MIDAZOLAM HYDROCHLORIDE 1 MG/ML
2 INJECTION INTRAMUSCULAR; INTRAVENOUS
Status: DISCONTINUED | OUTPATIENT
Start: 2018-07-09 | End: 2018-07-09 | Stop reason: HOSPADM

## 2018-07-09 RX ORDER — PROMETHAZINE HYDROCHLORIDE 25 MG/1
12.5 TABLET ORAL ONCE AS NEEDED
Status: DISCONTINUED | OUTPATIENT
Start: 2018-07-09 | End: 2018-07-09 | Stop reason: HOSPADM

## 2018-07-09 RX ORDER — PROPOFOL 10 MG/ML
VIAL (ML) INTRAVENOUS AS NEEDED
Status: DISCONTINUED | OUTPATIENT
Start: 2018-07-09 | End: 2018-07-09 | Stop reason: SURG

## 2018-07-09 RX ORDER — DEXAMETHASONE SODIUM PHOSPHATE 10 MG/ML
INJECTION INTRAMUSCULAR; INTRAVENOUS AS NEEDED
Status: DISCONTINUED | OUTPATIENT
Start: 2018-07-09 | End: 2018-07-09 | Stop reason: SURG

## 2018-07-09 RX ORDER — DIPHENHYDRAMINE HYDROCHLORIDE 50 MG/ML
12.5 INJECTION INTRAMUSCULAR; INTRAVENOUS
Status: COMPLETED | OUTPATIENT
Start: 2018-07-09 | End: 2018-07-09

## 2018-07-09 RX ORDER — ONDANSETRON 2 MG/ML
4 INJECTION INTRAMUSCULAR; INTRAVENOUS ONCE AS NEEDED
Status: DISCONTINUED | OUTPATIENT
Start: 2018-07-09 | End: 2018-07-09 | Stop reason: HOSPADM

## 2018-07-09 RX ORDER — OXYCODONE AND ACETAMINOPHEN 7.5; 325 MG/1; MG/1
1 TABLET ORAL ONCE AS NEEDED
Status: DISCONTINUED | OUTPATIENT
Start: 2018-07-09 | End: 2018-07-09 | Stop reason: HOSPADM

## 2018-07-09 RX ORDER — ONDANSETRON 4 MG/1
4 TABLET, ORALLY DISINTEGRATING ORAL EVERY 6 HOURS PRN
Status: DISCONTINUED | OUTPATIENT
Start: 2018-07-09 | End: 2018-07-12 | Stop reason: HOSPADM

## 2018-07-09 RX ORDER — ONDANSETRON 2 MG/ML
4 INJECTION INTRAMUSCULAR; INTRAVENOUS EVERY 6 HOURS PRN
Status: DISCONTINUED | OUTPATIENT
Start: 2018-07-09 | End: 2018-07-12 | Stop reason: HOSPADM

## 2018-07-09 RX ORDER — KETOROLAC TROMETHAMINE 30 MG/ML
15 INJECTION, SOLUTION INTRAMUSCULAR; INTRAVENOUS EVERY 6 HOURS PRN
Status: DISPENSED | OUTPATIENT
Start: 2018-07-09 | End: 2018-07-10

## 2018-07-09 RX ORDER — VITAMIN E 268 MG
400 CAPSULE ORAL EVERY MORNING
Status: DISCONTINUED | OUTPATIENT
Start: 2018-07-10 | End: 2018-07-12 | Stop reason: HOSPADM

## 2018-07-09 RX ORDER — FAMOTIDINE 10 MG/ML
20 INJECTION, SOLUTION INTRAVENOUS ONCE
Status: COMPLETED | OUTPATIENT
Start: 2018-07-09 | End: 2018-07-09

## 2018-07-09 RX ORDER — UREA 10 %
1250 LOTION (ML) TOPICAL DAILY
Status: DISCONTINUED | OUTPATIENT
Start: 2018-07-10 | End: 2018-07-09 | Stop reason: CLARIF

## 2018-07-09 RX ORDER — PROMETHAZINE HYDROCHLORIDE 25 MG/1
25 SUPPOSITORY RECTAL ONCE AS NEEDED
Status: DISCONTINUED | OUTPATIENT
Start: 2018-07-09 | End: 2018-07-09 | Stop reason: HOSPADM

## 2018-07-09 RX ORDER — MIDAZOLAM HYDROCHLORIDE 1 MG/ML
1 INJECTION INTRAMUSCULAR; INTRAVENOUS
Status: DISCONTINUED | OUTPATIENT
Start: 2018-07-09 | End: 2018-07-09 | Stop reason: HOSPADM

## 2018-07-09 RX ORDER — PROMETHAZINE HYDROCHLORIDE 25 MG/ML
12.5 INJECTION, SOLUTION INTRAMUSCULAR; INTRAVENOUS ONCE AS NEEDED
Status: DISCONTINUED | OUTPATIENT
Start: 2018-07-09 | End: 2018-07-09 | Stop reason: HOSPADM

## 2018-07-09 RX ORDER — MAGNESIUM HYDROXIDE 1200 MG/15ML
LIQUID ORAL AS NEEDED
Status: DISCONTINUED | OUTPATIENT
Start: 2018-07-09 | End: 2018-07-09 | Stop reason: HOSPADM

## 2018-07-09 RX ORDER — HYDROCODONE BITARTRATE AND ACETAMINOPHEN 7.5; 325 MG/1; MG/1
1 TABLET ORAL ONCE AS NEEDED
Status: DISCONTINUED | OUTPATIENT
Start: 2018-07-09 | End: 2018-07-09 | Stop reason: HOSPADM

## 2018-07-09 RX ORDER — ONDANSETRON 2 MG/ML
INJECTION INTRAMUSCULAR; INTRAVENOUS AS NEEDED
Status: DISCONTINUED | OUTPATIENT
Start: 2018-07-09 | End: 2018-07-09 | Stop reason: SURG

## 2018-07-09 RX ORDER — FLUTICASONE PROPIONATE 50 MCG
2 SPRAY, SUSPENSION (ML) NASAL EVERY MORNING
Status: DISCONTINUED | OUTPATIENT
Start: 2018-07-10 | End: 2018-07-12 | Stop reason: HOSPADM

## 2018-07-09 RX ORDER — SODIUM CHLORIDE, SODIUM LACTATE, POTASSIUM CHLORIDE, CALCIUM CHLORIDE 600; 310; 30; 20 MG/100ML; MG/100ML; MG/100ML; MG/100ML
75 INJECTION, SOLUTION INTRAVENOUS CONTINUOUS
Status: DISCONTINUED | OUTPATIENT
Start: 2018-07-09 | End: 2018-07-12 | Stop reason: HOSPADM

## 2018-07-09 RX ORDER — FENTANYL CITRATE 50 UG/ML
INJECTION, SOLUTION INTRAMUSCULAR; INTRAVENOUS AS NEEDED
Status: DISCONTINUED | OUTPATIENT
Start: 2018-07-09 | End: 2018-07-09 | Stop reason: SURG

## 2018-07-09 RX ORDER — LIDOCAINE HYDROCHLORIDE 10 MG/ML
0.5 INJECTION, SOLUTION EPIDURAL; INFILTRATION; INTRACAUDAL; PERINEURAL ONCE AS NEEDED
Status: DISCONTINUED | OUTPATIENT
Start: 2018-07-09 | End: 2018-07-09 | Stop reason: HOSPADM

## 2018-07-09 RX ORDER — FLUMAZENIL 0.1 MG/ML
0.2 INJECTION INTRAVENOUS AS NEEDED
Status: DISCONTINUED | OUTPATIENT
Start: 2018-07-09 | End: 2018-07-09 | Stop reason: HOSPADM

## 2018-07-09 RX ORDER — OXYCODONE AND ACETAMINOPHEN 7.5; 325 MG/1; MG/1
2 TABLET ORAL EVERY 4 HOURS PRN
Status: DISCONTINUED | OUTPATIENT
Start: 2018-07-09 | End: 2018-07-12 | Stop reason: HOSPADM

## 2018-07-09 RX ORDER — CEFAZOLIN SODIUM 2 G/100ML
2 INJECTION, SOLUTION INTRAVENOUS ONCE
Status: COMPLETED | OUTPATIENT
Start: 2018-07-09 | End: 2018-07-09

## 2018-07-09 RX ORDER — FLUOXETINE HYDROCHLORIDE 20 MG/1
20 CAPSULE ORAL EVERY MORNING
Status: DISCONTINUED | OUTPATIENT
Start: 2018-07-10 | End: 2018-07-12 | Stop reason: HOSPADM

## 2018-07-09 RX ORDER — ONDANSETRON 4 MG/1
4 TABLET, FILM COATED ORAL EVERY 6 HOURS PRN
Status: DISCONTINUED | OUTPATIENT
Start: 2018-07-09 | End: 2018-07-12 | Stop reason: HOSPADM

## 2018-07-09 RX ADMIN — FENTANYL CITRATE 50 MCG: 50 INJECTION, SOLUTION INTRAMUSCULAR; INTRAVENOUS at 19:24

## 2018-07-09 RX ADMIN — FENTANYL CITRATE 100 MCG: 50 INJECTION, SOLUTION INTRAMUSCULAR; INTRAVENOUS at 16:38

## 2018-07-09 RX ADMIN — FENTANYL CITRATE 50 MCG: 50 INJECTION, SOLUTION INTRAMUSCULAR; INTRAVENOUS at 20:30

## 2018-07-09 RX ADMIN — KETOROLAC TROMETHAMINE 15 MG: 30 INJECTION, SOLUTION INTRAMUSCULAR; INTRAVENOUS at 17:52

## 2018-07-09 RX ADMIN — FENTANYL CITRATE 50 MCG: 50 INJECTION, SOLUTION INTRAMUSCULAR; INTRAVENOUS at 15:02

## 2018-07-09 RX ADMIN — DEXAMETHASONE SODIUM PHOSPHATE 8 MG: 10 INJECTION INTRAMUSCULAR; INTRAVENOUS at 16:43

## 2018-07-09 RX ADMIN — SODIUM CHLORIDE, POTASSIUM CHLORIDE, SODIUM LACTATE AND CALCIUM CHLORIDE 9 ML/HR: 600; 310; 30; 20 INJECTION, SOLUTION INTRAVENOUS at 15:01

## 2018-07-09 RX ADMIN — ONDANSETRON 4 MG: 2 INJECTION INTRAMUSCULAR; INTRAVENOUS at 17:15

## 2018-07-09 RX ADMIN — FAMOTIDINE 20 MG: 10 INJECTION, SOLUTION INTRAVENOUS at 15:01

## 2018-07-09 RX ADMIN — SODIUM CHLORIDE, POTASSIUM CHLORIDE, SODIUM LACTATE AND CALCIUM CHLORIDE 75 ML/HR: 600; 310; 30; 20 INJECTION, SOLUTION INTRAVENOUS at 21:38

## 2018-07-09 RX ADMIN — HYDROMORPHONE HYDROCHLORIDE 1 MG: 2 INJECTION INTRAMUSCULAR; INTRAVENOUS; SUBCUTANEOUS at 17:25

## 2018-07-09 RX ADMIN — PROPOFOL 200 MG: 10 INJECTION, EMULSION INTRAVENOUS at 16:38

## 2018-07-09 RX ADMIN — FENTANYL CITRATE 50 MCG: 50 INJECTION, SOLUTION INTRAMUSCULAR; INTRAVENOUS at 20:15

## 2018-07-09 RX ADMIN — LIDOCAINE HYDROCHLORIDE 80 MG: 20 INJECTION, SOLUTION INFILTRATION; PERINEURAL at 16:38

## 2018-07-09 RX ADMIN — FENTANYL CITRATE 50 MCG: 50 INJECTION, SOLUTION INTRAMUSCULAR; INTRAVENOUS at 16:56

## 2018-07-09 RX ADMIN — HYDROMORPHONE HYDROCHLORIDE 0.5 MG: 1 INJECTION, SOLUTION INTRAMUSCULAR; INTRAVENOUS; SUBCUTANEOUS at 20:19

## 2018-07-09 RX ADMIN — FENTANYL CITRATE 100 MCG: 50 INJECTION, SOLUTION INTRAMUSCULAR; INTRAVENOUS at 16:42

## 2018-07-09 RX ADMIN — DIPHENHYDRAMINE HYDROCHLORIDE 12.5 MG: 50 INJECTION INTRAMUSCULAR; INTRAVENOUS at 20:18

## 2018-07-09 RX ADMIN — CEFAZOLIN SODIUM 2 G: 2 INJECTION, SOLUTION INTRAVENOUS at 16:35

## 2018-07-09 RX ADMIN — HYDROMORPHONE HYDROCHLORIDE 1 MG: 2 INJECTION INTRAMUSCULAR; INTRAVENOUS; SUBCUTANEOUS at 18:00

## 2018-07-09 RX ADMIN — SODIUM CHLORIDE, POTASSIUM CHLORIDE, SODIUM LACTATE AND CALCIUM CHLORIDE: 600; 310; 30; 20 INJECTION, SOLUTION INTRAVENOUS at 17:42

## 2018-07-09 RX ADMIN — DOCUSATE SODIUM -SENNOSIDES 2 TABLET: 50; 8.6 TABLET, COATED ORAL at 22:50

## 2018-07-09 RX ADMIN — ROPIVACAINE HYDROCHLORIDE 30 ML: 5 INJECTION, SOLUTION EPIDURAL; INFILTRATION; PERINEURAL at 16:00

## 2018-07-09 RX ADMIN — FENTANYL CITRATE 50 MCG: 50 INJECTION, SOLUTION INTRAMUSCULAR; INTRAVENOUS at 16:02

## 2018-07-09 RX ADMIN — HYDROMORPHONE HYDROCHLORIDE 0.5 MG: 1 INJECTION, SOLUTION INTRAMUSCULAR; INTRAVENOUS; SUBCUTANEOUS at 20:32

## 2018-07-09 RX ADMIN — CEFAZOLIN SODIUM 2 G: 2 INJECTION, SOLUTION INTRAVENOUS at 23:41

## 2018-07-09 RX ADMIN — HYDROMORPHONE HYDROCHLORIDE 0.5 MG: 1 INJECTION, SOLUTION INTRAMUSCULAR; INTRAVENOUS; SUBCUTANEOUS at 19:24

## 2018-07-09 RX ADMIN — HYDROMORPHONE HYDROCHLORIDE 0.5 MG: 1 INJECTION, SOLUTION INTRAMUSCULAR; INTRAVENOUS; SUBCUTANEOUS at 23:09

## 2018-07-09 RX ADMIN — MIDAZOLAM HYDROCHLORIDE 2 MG: 2 INJECTION, SOLUTION INTRAMUSCULAR; INTRAVENOUS at 15:59

## 2018-07-09 RX ADMIN — FENTANYL CITRATE 50 MCG: 50 INJECTION, SOLUTION INTRAMUSCULAR; INTRAVENOUS at 15:59

## 2018-07-09 RX ADMIN — DIPHENHYDRAMINE HYDROCHLORIDE 12.5 MG: 50 INJECTION INTRAMUSCULAR; INTRAVENOUS at 19:24

## 2018-07-09 RX ADMIN — FENTANYL CITRATE 50 MCG: 50 INJECTION, SOLUTION INTRAMUSCULAR; INTRAVENOUS at 20:43

## 2018-07-09 RX ADMIN — MIDAZOLAM HYDROCHLORIDE 1 MG: 2 INJECTION, SOLUTION INTRAMUSCULAR; INTRAVENOUS at 15:01

## 2018-07-09 RX ADMIN — SODIUM CHLORIDE, POTASSIUM CHLORIDE, SODIUM LACTATE AND CALCIUM CHLORIDE: 600; 310; 30; 20 INJECTION, SOLUTION INTRAVENOUS at 16:35

## 2018-07-09 NOTE — ANESTHESIA PROCEDURE NOTES
Airway  Urgency: elective    Airway not difficult    General Information and Staff    Patient location during procedure: OR  Anesthesiologist: TERESA BUCKLEY    Indications and Patient Condition  Indications: operative anesthetic.    Preoxygenated: yes  Mask difficulty assessment: 0 - not attempted    Final Airway Details  Final airway type: supraglottic airway      Successful airway: classic  Size 4    Number of attempts at approach: 1

## 2018-07-09 NOTE — H&P
History & Physical       Patient: Sophie Ace    Date of Admission: 7/9/2018  1:32 PM    YOB: 1965    Medical Record Number: [id number]    Attending Physician: Bhanu Baxter MD        Chief Complaints: Recurrent instability of left knee joint [M23.52]      History of Present Illness: 53 y.o. female presents with Recurrent instability of left knee joint [M23.52]. Had recent conversion of failed uni-knee to TKA with MCL reefing. Still has medial instability, presents today for revision of femoral component and poly (PCS knee) and possible advancement of MCL       Allergies:   Allergies   Allergen Reactions   • Erythromycin Swelling     Throat swelling   • Morphine Itching and Irritability     ALSO ANXIETY, ITCHING, INSOMNIA   • Penicillins Swelling     Throat.STATES CAN TAKE KEFLEX   • Sulfa Antibiotics Anaphylaxis   • Tetracyclines & Related Swelling     throat       Medications:   Home Medications:  No current facility-administered medications on file prior to encounter.      Current Outpatient Prescriptions on File Prior to Encounter   Medication Sig   • Calcium Carbonate (CALCIUM 600 PO) Take 1 tablet by mouth 2 (Two) Times a Day.   • Cholecalciferol (VITAMIN D PO) Take 1,000 mg by mouth Every Morning.   • Cyanocobalamin (VITAMIN B-12) 1000 MCG sublingual tablet Place 1 tablet under the tongue Every Morning.   • DULoxetine (CYMBALTA) 60 MG capsule Take 120 mg by mouth Every Morning. TAKES 2 60 MG TAB    • esomeprazole (nexIUM) 40 MG capsule Take 40 mg by mouth Every Morning Before Breakfast.   • FLUoxetine (PROZAC) 20 MG capsule Take 20 mg by mouth Every Morning.   • fluticasone (FLONASE) 50 MCG/ACT nasal spray 2 sprays by Each Nare route Every Morning.   • Glucosamine-Chondroit-Vit C-Mn (GLUCOSAMINE 1500 COMPLEX PO) Take 3,000 mg by mouth Every Morning.   • Multiple Vitamins-Minerals (MULTIVITAMIN ADULTS PO) Take 1 tablet by mouth Every Morning.   • vitamin E 400 UNIT capsule Take 400 Units by  mouth Every Morning.     Current Medications:  Scheduled Meds:  ceFAZolin 2 g Intravenous Once     Continuous Infusions:  lactated ringers 9 mL/hr Last Rate: 9 mL/hr (18 1501)     PRN Meds:.fentanyl  •  lidocaine PF 1%  •  midazolam **OR** midazolam  •  sodium chloride    Past Medical History:   Diagnosis Date   • Anxiety and depression    • Arthritis    • Gastric reflux    • Hypertension    • IBS (irritable bowel syndrome)    • Limited joint range of motion     LEFT KNEE   • Limited range of motion (ROM) of shoulder     RIGHT   • Rotator cuff tear     RIGHT   • Seasonal allergies    • Sleep apnea     USES C-PAP   • Vitamin B 12 deficiency    • Vitamin D deficiency         Past Surgical History:   Procedure Laterality Date   •  SECTION  1987   1992    X3   • COLONOSCOPY  2015   • D&C HYSTEROSCOPY ENDOMETRIAL ABLATION     • ESOPHAGEAL DILATATION      X 5   • HIATAL HERNIA REPAIR      WITH LAP BAND   • INGUINAL HERNIA REPAIR Left    • KNEE MINI REVISION Left 2017   • LAPAROSCOPIC CHOLECYSTECTOMY     • LAPAROSCOPIC GASTRIC BANDING     • PARTIAL KNEE ARTHROPLASTY Left    • RI REVISE KNEE JOINT REPLACE,1 PART Left 2017    Procedure: REVISION LT TOTAL KNEE ;  Surgeon: Bhanu Baxter MD;  Location: [facility] OR;  Service: Orthopedics   • TUBAL ABDOMINAL LIGATION     • UMBILICAL HERNIA REPAIR     • VENTRAL HERNIA REPAIR          Social History     Occupational History   • Not on file.     Social History Main Topics   • Smoking status: Never Smoker   • Smokeless tobacco: Never Used   • Alcohol use No   • Drug use: No   • Sexual activity: Defer    Social History     Social History Narrative   • No narrative on file        Family History   Problem Relation Age of Onset   • Malig Hyperthermia Neg Hx          Review of Systems:   HEENT: Patient denies any headaches, vision changes, change in hearing, or tinnitus, Patient denies any rhinorrhea,epistaxis, sinus  pain, mouth or dental problems, sore throat or hoarseness, or dysphagia  Pulmonary: Patient denies any cough, congestion, SOA, or wheezing  Cardiovascular: Patient denies any chest pain, dyspnea, palpitations, weakness, intolerance of exercise, varicosities, swelling of extremities, known murmur  Gastrointestinal:  Patient denies nausea, vomiting, diarrhea, constipation, loss  of appetite, change in appetite, dysphagia, gas, heartburn, melena, change in bowel habits, use of laxatives or other drugs to alter the function of the gastrointestinal tract.  Genital/Urinary: Patient denies dysuria, change in color of urine, change in frequency of urination, pain with urgency, incontinence, retention, or nocturia.  Musculoskeletal: Patient denies increased warmth; redness; or swelling of joints; limitation of function; deformity; crepitation: pain in a joint or an extremity, the neck, or the back, especially with movement.  Neurological: Patient denies dizziness, tremor, ataxia, difficulty in speaking, change in speech, paresthesia, loss of sensation, seizures, syncope, changes in memory.  Endocrine system: Patient denies tremors, palpitations, intolerance of heat or cold, polyuria, polydipsia, polyphagia, diaphoresis, exophthalmos, or goiter.  Psychological: Patient denies thoughts/plans or harming self or other; depression,  insomnia, night terrors, chris, memory loss, disorientation.  Skin: Patient denies any bruising, rashes, discoloration, pruritus, wounds, ulcers, decubiti, changes in the hair or nails  Hematopoietic: Patient denies history of spontaneous or excessive bleeding, epistaxis, hematuria, melena, fatigue, enlarged or tender lymph nodes, pallor, history of anemia.    Physical Exam: 53 y.o. female  General Appearance:    Alert, cooperative, in no acute distress                    Vitals:    07/09/18 1506 07/09/18 1548 07/09/18 1559 07/09/18 1605   BP:  118/79 118/79 126/82   BP Location:  Right arm Right arm  Right arm   Patient Position:  Lying Lying Lying   Pulse: 82 74 74 81   Resp:  18 18 18   Temp:       TempSrc:       SpO2: 100%  Comment: post pain medication and sedation 100% 100% 100%   Weight:       Height:            Head:    Normocephalic, without obvious abnormality, atraumatic   Eyes:            Lids and lashes normal, conjunctivae and sclerae normal, no   icterus, no pallor, corneas clear, PERRLA   Ears:    Ears appear intact with no abnormalities noted   Throat:   No oral lesions, no thrush, oral mucosa moist   Neck:   No adenopathy, supple, trachea midline, no thyromegaly, no   carotid bruit, no JVD   Back:     No kyphosis present, no scoliosis present, no skin lesions,      erythema or scars, no tenderness to percussion or                   palpation,   range of motion normal   Lungs:     Clear to auscultation,respirations regular, even and                  unlabored    Heart:    Regular rhythm and normal rate, normal S1 and S2, no            murmur, no gallop, no rub, no click   Chest Wall:    No abnormalities observed   Abdomen:     Normal bowel sounds, no masses, no organomegaly, soft        non-tender, non-distended, no guarding, no rebound                tenderness   Rectal:     Deferred   Extremities:   Tenderness over medial left knee; incision healed, no effusion. Moves all extremities well, no edema, no cyanosis, no redness   Pulses:   Pulses palpable and equal bilaterally   Skin:   No bleeding, bruising or rash   Lymph nodes:   No palpable adenopathy   Neurologic:   Cranial nerves 2 - 12 grossly intact, sensation intact, DTR       present and equal bilaterally           Diagnostic Tests:                  [unfilled]    Assessment:  Patient Active Problem List   Diagnosis   • Pain due to internal orthopedic prosthetic devices, implants and grafts, initial encounter (CMS/[facility])   • Recurrent instability of left knee joint           Plan:  The patient voiced understanding of the risks, benefits,  and alternative forms of treatment that were discussed and the patient consents to proceed with revision of femoral component with probable MCL advancement..       Discharge Plan: tomorrow to home      Date: 7/9/2018  Bhanu Baxter MD

## 2018-07-09 NOTE — ANESTHESIA PREPROCEDURE EVALUATION
Anesthesia Evaluation     Patient summary reviewed and Nursing notes reviewed                Airway   Mallampati: III  Dental      Pulmonary    (+) sleep apnea on CPAP,   Cardiovascular     ECG reviewed  Rhythm: regular  Rate: normal    (+) hypertension,       Neuro/Psych  (+) psychiatric history Anxiety and Depression,     GI/Hepatic/Renal/Endo    (+)  GERD,      Musculoskeletal     Abdominal    Substance History - negative use     OB/GYN negative ob/gyn ROS         Other   (+) arthritis                     Anesthesia Plan    ASA 3     general     intravenous induction   Anesthetic plan and risks discussed with patient.

## 2018-07-09 NOTE — OP NOTE
OPERATIVE REPORT      Facility: [facility]    Patient name: Sophie Ace  : 1965        Medical record: [id number]    Date of procedure: 2018    Pre-operative diagnosis: Left knee TKA instability/MCL deficiency    Post-operative diagnosis: Same    Procedure performed: Left revision total knee arthroplasty    Implants: Biomet Vanguard total knee system   Femur - 60   Augments - 5/5  Stem - 12x80   Tibial tray - na  Augments - na  Stem - na   Patella - na   Bearing - 16 PS    Surgeon:Bhanu Baxter M.D.     Anesthesia: General endotracheal anesthesia plus supplemental regional block    Estimated blood loss: less than 100  milliliters     Drains: 10 Danish Hemovac    Specimens: None    Complications: None apparent      Indications: Sophie Ace is a pleasant 53 y.o. 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  This has been evaluated, and she presents today for revision of the femur and poly of her TKA. We discussed the risks of the procedure which include, but are not limited to, infection, DVT, PE, damage to nerves or blood vessels at the time of surgery, bleeding and blood loss, stiffness/arthrofibrosis, and risk of loosening or failure of the components requiring revision surgery. The patient expressed understanding and wished to proceed with the surgery. An informed consent form was signed and placed on the chart prior to coming to the Operating Room.       Description of Procedure: The patient was identified in the preop holding area and the left knee was marked as the correct operative site. The patient was given a dose of perioperative antibiotics and then brought to the Operating Room and placed supine on the operating table. After adequate anesthesia was obtained, a tourniquet was placed on the operative thigh. The operative extremity was then prepped and draped in standard sterile fashion. A surgical time out was  performed confirming that the left knee was the correct operative site.     The operative limb was exsanguinated and the tourniquet was inflated to 300mm Hg. A longitudinal incision was made through the previous well healed incision, excising excessive scar tissue.. Dissection was carried down to the level of the knee joint capsule. A medial parapatellar quad splitting incision was made in the capsule and the  patella was then reflected and the knee flexed.     The soft tissue was inspected and there was evidence of mild synovitis.  There was no overt evidence of infection.  The femoral implant was then removed using the flexible osteotomes, straight osteotomes, and extraction tool.  minimal bone loss was noted. The tibial poly and bar were removed. The tray was in good position and well fixed, as was the patella.    A reamer was used to create a hole in the canal of the femur. An intra-medullary guide hema was placed with a 5 degrees valgus wing for alignment of the distal femoral cut. The fluted reamers were used in order and the 12mm reamer left in position for alignment. The cut block was pinned into place based on this guide. The distal femoral clean up cut was performed with a saw using the 0mm resection line. The femur was then sized with the AP sizing guide and measured at 60mm. The matching 4-in-1 cut block was then placed in the femur with 5mm distal augment trials in place, and the anterior, posterior, and chamfer cuts were created. There was no anterior femoral notching noted. A trial femoral component was placed in position confirming the cuts and joint line placement.    The trial components were placed on the femur and tibial tray. The knee was taken through a full range of motion. There was good balance both medially and laterally in flexion and extension; The MCL was still lax, but well protected by the post. There was good tracking of the patella.     The permanent components were opened on the back  table and cement was mixed using Palacos-R+G. The wound was thoroughly irrigated and the bone was prepared for cementation. The components were assembled on the back table, and the femur was cemented and impacted. All excess cement was removed with a [place] elevator. A trial spacer was placed between the components while the cement cured. After the cement had cured, a final check for excess cement was performed. The knee was taken through a full range of motion with different trial spacers and the size that gave the best fit and allowed full extension was selected, which measured 16mm. The permanent polyethylene component was opened and placed into the tibial tray. It was fixed in place with a locking bar. A final range of motion of the knee was performed.    The wound was then thoroughly irrigated and any excess or fragmented cement was removed. Hemostasis was obtained using an electrocautery device. A 10 Croatian Hemovac drain was placed in the joint and brought out the lateral skin of the thigh.     Attention was then turned to wound closure. The arthrotomy was closed using 0 V-lock suture. The subcutaneous tissue was closed with 2-0 Vicryl suture.  The skin was closed with 3-0 Monocryl and Dermabond.  Sterile dressings were applied consisting of 4 x 4's, ABDs, sterile cast padding, and sterile ACE wrap. The patient's knee was placed in a cold pack. The patient was then awakened from anesthesia and transferred to the Recovery Room in stable condition.

## 2018-07-09 NOTE — ANESTHESIA PROCEDURE NOTES
Peripheral Block    Patient location during procedure: holding area  Start time: 7/9/2018 4:05 PM  Stop time: 7/9/2018 4:10 PM  Reason for block: at surgeon's request and post-op pain management  Performed by  Anesthesiologist: TERESA BUCKLEY  Preanesthetic Checklist  Completed: patient identified, site marked, surgical consent, pre-op evaluation, timeout performed, IV checked, risks and benefits discussed and monitors and equipment checked  Prep:  Sterile barriers:cap, gloves and mask  Prep: ChloraPrep  Patient monitoring: blood pressure monitoring, continuous pulse oximetry and EKG  Procedure  Sedation:yes  Performed under: local infiltration  Guidance:ultrasound guided  ULTRASOUND INTERPRETATION.  Using ultrasound guidance a 21 G gauge needle was placed in close proximity to the femoral nerve, at which point, under ultrasound guidance anesthetic was injected in the area of the nerve and spread of the anesthesia was seen on ultrasound in close proximity thereto.  There were no abnormalities seen on ultrasound; a digital image was taken; and the patient tolerated the procedure with no complications. Images:still images obtained    Laterality:left  Block Type:femoral  Injection Technique:single-shot  Needle Type:echogenic  Resistance on Injection: none  Medications  Local Injected:ropivacaine 0.5% without epinephrine Local Amount Injected:20mL  Post Assessment  Injection Assessment: negative aspiration for heme, no paresthesia on injection and incremental injection  Patient Tolerance:comfortable throughout block  Complications:no  Additional Notes  Ultrasound Interpretation:  Using ultrasound guidance the needle was placed in close proximity to the femoral nerve and anesthetic was injected in the area of the femoral nerve and spread of the anesthetic was seen on ultrasound in close proximity thereto.  There were no abnormalities seen on ultrasound; a digital image was taken; and the patient tolerated the  procedure with no complications.    Block placed for postoperative pain control per surgeon request.

## 2018-07-10 LAB
ANION GAP SERPL CALCULATED.3IONS-SCNC: 15.3 MMOL/L
BASOPHILS # BLD AUTO: 0.01 10*3/MM3 (ref 0–0.2)
BASOPHILS NFR BLD AUTO: 0.1 % (ref 0–1.5)
BUN BLD-MCNC: 13 MG/DL (ref 6–20)
BUN/CREAT SERPL: 20.6 (ref 7–25)
CALCIUM SPEC-SCNC: 8.7 MG/DL (ref 8.6–10.5)
CHLORIDE SERPL-SCNC: 103 MMOL/L (ref 98–107)
CO2 SERPL-SCNC: 22.7 MMOL/L (ref 22–29)
CREAT BLD-MCNC: 0.63 MG/DL (ref 0.57–1)
DEPRECATED RDW RBC AUTO: 46.8 FL (ref 37–54)
EOSINOPHIL # BLD AUTO: 0 10*3/MM3 (ref 0–0.7)
EOSINOPHIL NFR BLD AUTO: 0 % (ref 0.3–6.2)
ERYTHROCYTE [DISTWIDTH] IN BLOOD BY AUTOMATED COUNT: 13.5 % (ref 11.7–13)
GFR SERPL CREATININE-BSD FRML MDRD: 98 ML/MIN/1.73
GLUCOSE BLD-MCNC: 136 MG/DL (ref 65–99)
HCT VFR BLD AUTO: 36.5 % (ref 35.6–45.5)
HGB BLD-MCNC: 11.3 G/DL (ref 11.9–15.5)
IMM GRANULOCYTES # BLD: 0.02 10*3/MM3 (ref 0–0.03)
IMM GRANULOCYTES NFR BLD: 0.2 % (ref 0–0.5)
LYMPHOCYTES # BLD AUTO: 0.5 10*3/MM3 (ref 0.9–4.8)
LYMPHOCYTES NFR BLD AUTO: 6 % (ref 19.6–45.3)
MCH RBC QN AUTO: 29.3 PG (ref 26.9–32)
MCHC RBC AUTO-ENTMCNC: 31 G/DL (ref 32.4–36.3)
MCV RBC AUTO: 94.6 FL (ref 80.5–98.2)
MONOCYTES # BLD AUTO: 0.35 10*3/MM3 (ref 0.2–1.2)
MONOCYTES NFR BLD AUTO: 4.2 % (ref 5–12)
NEUTROPHILS # BLD AUTO: 7.51 10*3/MM3 (ref 1.9–8.1)
NEUTROPHILS NFR BLD AUTO: 89.7 % (ref 42.7–76)
PLATELET # BLD AUTO: 168 10*3/MM3 (ref 140–500)
PMV BLD AUTO: 11.9 FL (ref 6–12)
POTASSIUM BLD-SCNC: 4.3 MMOL/L (ref 3.5–5.2)
RBC # BLD AUTO: 3.86 10*6/MM3 (ref 3.9–5.2)
SODIUM BLD-SCNC: 141 MMOL/L (ref 136–145)
WBC NRBC COR # BLD: 8.37 10*3/MM3 (ref 4.5–10.7)

## 2018-07-10 PROCEDURE — 80048 BASIC METABOLIC PNL TOTAL CA: CPT | Performed by: ORTHOPAEDIC SURGERY

## 2018-07-10 PROCEDURE — 97110 THERAPEUTIC EXERCISES: CPT

## 2018-07-10 PROCEDURE — 25010000003 CEFAZOLIN IN DEXTROSE 2-4 GM/100ML-% SOLUTION: Performed by: ORTHOPAEDIC SURGERY

## 2018-07-10 PROCEDURE — 97162 PT EVAL MOD COMPLEX 30 MIN: CPT

## 2018-07-10 PROCEDURE — 85025 COMPLETE CBC W/AUTO DIFF WBC: CPT | Performed by: ORTHOPAEDIC SURGERY

## 2018-07-10 PROCEDURE — 97150 GROUP THERAPEUTIC PROCEDURES: CPT

## 2018-07-10 PROCEDURE — G0378 HOSPITAL OBSERVATION PER HR: HCPCS

## 2018-07-10 PROCEDURE — 25010000002 HYDROMORPHONE PER 4 MG: Performed by: ORTHOPAEDIC SURGERY

## 2018-07-10 PROCEDURE — 25010000002 KETOROLAC TROMETHAMINE PER 15 MG: Performed by: ORTHOPAEDIC SURGERY

## 2018-07-10 RX ORDER — OXYCODONE AND ACETAMINOPHEN 7.5; 325 MG/1; MG/1
2 TABLET ORAL EVERY 4 HOURS PRN
Qty: 80 TABLET | Refills: 0 | Status: SHIPPED | OUTPATIENT
Start: 2018-07-10 | End: 2018-07-19

## 2018-07-10 RX ADMIN — HYDROMORPHONE HYDROCHLORIDE 0.5 MG: 1 INJECTION, SOLUTION INTRAMUSCULAR; INTRAVENOUS; SUBCUTANEOUS at 06:04

## 2018-07-10 RX ADMIN — CALCIUM 500 MG: 500 TABLET ORAL at 08:00

## 2018-07-10 RX ADMIN — CEFAZOLIN SODIUM 2 G: 2 INJECTION, SOLUTION INTRAVENOUS at 05:54

## 2018-07-10 RX ADMIN — PANTOPRAZOLE SODIUM 40 MG: 40 TABLET, DELAYED RELEASE ORAL at 06:08

## 2018-07-10 RX ADMIN — OXYCODONE HYDROCHLORIDE AND ACETAMINOPHEN 2 TABLET: 7.5; 325 TABLET ORAL at 12:37

## 2018-07-10 RX ADMIN — FLUOXETINE HYDROCHLORIDE 20 MG: 20 CAPSULE ORAL at 06:04

## 2018-07-10 RX ADMIN — KETOROLAC TROMETHAMINE 15 MG: 30 INJECTION, SOLUTION INTRAMUSCULAR at 18:50

## 2018-07-10 RX ADMIN — HYDROMORPHONE HYDROCHLORIDE 0.5 MG: 1 INJECTION, SOLUTION INTRAMUSCULAR; INTRAVENOUS; SUBCUTANEOUS at 18:50

## 2018-07-10 RX ADMIN — VITAMIN E CAP 400 UNIT 400 UNITS: 400 CAP at 06:04

## 2018-07-10 RX ADMIN — DULOXETINE HYDROCHLORIDE 120 MG: 60 CAPSULE, DELAYED RELEASE ORAL at 06:04

## 2018-07-10 RX ADMIN — OXYCODONE HYDROCHLORIDE AND ACETAMINOPHEN 2 TABLET: 7.5; 325 TABLET ORAL at 07:59

## 2018-07-10 RX ADMIN — FLUTICASONE PROPIONATE 2 SPRAY: 50 SPRAY, METERED NASAL at 06:04

## 2018-07-10 RX ADMIN — OXYCODONE HYDROCHLORIDE AND ACETAMINOPHEN 2 TABLET: 7.5; 325 TABLET ORAL at 17:04

## 2018-07-10 RX ADMIN — APIXABAN 2.5 MG: 2.5 TABLET, FILM COATED ORAL at 20:53

## 2018-07-10 RX ADMIN — APIXABAN 2.5 MG: 2.5 TABLET, FILM COATED ORAL at 08:00

## 2018-07-10 RX ADMIN — DOCUSATE SODIUM -SENNOSIDES 2 TABLET: 50; 8.6 TABLET, COATED ORAL at 20:53

## 2018-07-10 RX ADMIN — HYDROMORPHONE HYDROCHLORIDE 0.5 MG: 1 INJECTION, SOLUTION INTRAMUSCULAR; INTRAVENOUS; SUBCUTANEOUS at 21:30

## 2018-07-10 RX ADMIN — HYDROMORPHONE HYDROCHLORIDE 0.5 MG: 1 INJECTION, SOLUTION INTRAMUSCULAR; INTRAVENOUS; SUBCUTANEOUS at 02:02

## 2018-07-10 NOTE — ANESTHESIA POSTPROCEDURE EVALUATION
"Patient: Sophie Ace    Procedure Summary     Date:  07/09/18 Room / Location:  [facility] OR [address] [place] [address] MAIN OR    Anesthesia Start:  1635 Anesthesia Stop:  1846    Procedure:  FEMORAL REVISION COMPONENT LEFT KNEE AND POLYETHELENE (Left Knee) Diagnosis:      Surgeon:  Bhanu Baxter MD Provider:  Varun Aguirre MD    Anesthesia Type:  general ASA Status:  3          Anesthesia Type: general  Last vitals  BP   116/66 (07/09/18 2040)   Temp   36.9 °C (98.5 °F) (07/09/18 1842)   Pulse   82 (07/09/18 2040)   Resp   16 (07/09/18 2040)     SpO2   98 % (07/09/18 2040)     Post Anesthesia Care and Evaluation    Patient location during evaluation: bedside  Patient participation: complete - patient participated  Level of consciousness: awake and alert  Pain management: adequate  Airway patency: patent  Anesthetic complications: No anesthetic complications    Cardiovascular status: acceptable  Respiratory status: acceptable  Hydration status: acceptable    Comments: /66   Pulse 82   Temp 36.9 °C (98.5 °F) (Oral)   Resp 16   Ht 162.6 cm (64\")   Wt 110 kg (242 lb 5 oz)   SpO2 98%   BMI 41.59 kg/m²       "

## 2018-07-10 NOTE — PLAN OF CARE
Problem: Patient Care Overview  Goal: Plan of Care Review  Outcome: Ongoing (interventions implemented as appropriate)   07/10/18 3824   Coping/Psychosocial   Plan of Care Reviewed With patient   Plan of Care Review   Progress improving   OTHER   Outcome Summary Patient is POD 1 left knee revision. HV drain pulled per pt. Dressing changed planning for DC today or tomorrow morning. VSS. Pain being controlled with PO pain meds. Educated on BP monitoring r/t hx of HTN.     Goal: Individualization and Mutuality  Outcome: Ongoing (interventions implemented as appropriate)    Goal: Discharge Needs Assessment  Outcome: Ongoing (interventions implemented as appropriate)    Goal: Interprofessional Rounds/Family Conf  Outcome: Ongoing (interventions implemented as appropriate)      Problem: Fall Risk (Adult)  Goal: Absence of Fall  Outcome: Ongoing (interventions implemented as appropriate)      Problem: Skin Injury Risk (Adult)  Goal: Skin Health and Integrity  Outcome: Ongoing (interventions implemented as appropriate)      Problem: Pain, Acute (Adult)  Goal: Acceptable Pain Control/Comfort Level  Outcome: Ongoing (interventions implemented as appropriate)      Problem: Pain, Chronic (Adult)  Goal: Acceptable Pain/Comfort Level and Functional Ability  Outcome: Ongoing (interventions implemented as appropriate)

## 2018-07-10 NOTE — PROGRESS NOTES
Orthopedic Total Knee Progress Note      Status post-left total knee revision arthroplasty: Post-Operative Day: 1Doing well postoperatively.    Pain Relief: worsening since block wore off    Activity: up with assistance    Weight Bearing: WBAT     LOS: 0 days     Systemic or Specific Complaints: Pain Control    Objective     Vital signs in last 24 hours:    Vitals:    07/10/18 0327 07/10/18 0743 07/10/18 1107 07/10/18 1705   BP: 116/68 120/64 111/68 121/75   BP Location: Right arm Right arm Right arm Right arm   Patient Position: Lying Lying Lying Lying   Pulse: 70 80 72 85   Resp: 16 16 16 16   Temp: 98 °F (36.7 °C)  98 °F (36.7 °C)    TempSrc: Oral  Oral    SpO2: 99% 100% 98% 97%   Weight:       Height:           General: alert, appears stated age and cooperative   Neurovascular: Toes good active movements up and down, Gross sensation intact over toes.   Good capillary refill, toes pink and warm   Wound: Wound clean and dry no evidence of infection.   Activity: Doing well appropriate for the post op day   DVT Exam: No signs of DVT on physical examination   Hemovac Drain:  removed today       Data Review:    Results from last 7 days  Lab Units 07/10/18  0258   WBC 10*3/mm3 8.37   HEMOGLOBIN g/dL 11.3*   HEMATOCRIT % 36.5   PLATELETS 10*3/mm3 168       Results from last 7 days  Lab Units 07/10/18  0258   SODIUM mmol/L 141   POTASSIUM mmol/L 4.3   CHLORIDE mmol/L 103   CO2 mmol/L 22.7   BUN mg/dL 13   CREATININE mg/dL 0.63   GLUCOSE mg/dL 136*   CALCIUM mg/dL 8.7         No results found for: CRYSTAL]  No results found for: CULTURE]  No results found for: URICACID]  No results found.      Current Medications:  Scheduled Meds:  apixaban 2.5 mg Oral Q12H   calcium carbonate (oyster shell) 500 mg Oral Daily   DULoxetine 120 mg Oral QAM   FLUoxetine 20 mg Oral QAM   fluticasone 2 spray Each Nare QAM   pantoprazole 40 mg Oral QAM   sennosides-docusate sodium 2 tablet Oral Nightly   vitamin E 400 Units Oral QAM      Continuous Infusions:  lactated ringers 75 mL/hr Last Rate: Stopped (07/10/18 0858)     PRN Meds:.HYDROmorphone **AND** naloxone  •  ketorolac  •  ondansetron **OR** ondansetron ODT **OR** ondansetron  •  oxyCODONE-acetaminophen  Assessment/Plan     Continues current post-op course    Bhanu Baxter MD    Date: 7/10/2018  Time: 6:42 PM

## 2018-07-10 NOTE — PLAN OF CARE
"Problem: Patient Care Overview  Goal: Plan of Care Review  Outcome: Ongoing (interventions implemented as appropriate)   07/10/18 8543   Coping/Psychosocial   Plan of Care Reviewed With patient;family   Plan of Care Review   Progress improving   OTHER   Outcome Summary Pt incr ther ex; stood practiced weight shifting in place from Right to Left LE due to pt c/o leg \"feeling numb and not safe to walk\". Needs to perform stairs to go home safely. D/C tomorrow w/ fam and HHPT.         "

## 2018-07-10 NOTE — PLAN OF CARE
Problem: Patient Care Overview  Goal: Plan of Care Review  Outcome: Ongoing (interventions implemented as appropriate)   07/10/18 4170   Coping/Psychosocial   Plan of Care Reviewed With patient   Plan of Care Review   Progress no change   OTHER   Outcome Summary Patient admitted to floor after having a femoral revision component and polyethelene of the left knee. Drain in place. Knee ace wrapped and ice pack wrap in place. PRN pain medication given for pain control. Patient was able to get up to the bedside commode with walker and assistance. IV fluids started. PT to see today. Possible d/c home today or tomorrow. Will continue to monitor.        Problem: Fall Risk (Adult)  Goal: Identify Related Risk Factors and Signs and Symptoms  Outcome: Outcome(s) achieved Date Met: 07/10/18    Goal: Absence of Fall  Outcome: Ongoing (interventions implemented as appropriate)      Problem: Skin Injury Risk (Adult)  Goal: Identify Related Risk Factors and Signs and Symptoms  Outcome: Outcome(s) achieved Date Met: 07/10/18    Goal: Skin Health and Integrity  Outcome: Ongoing (interventions implemented as appropriate)      Problem: Pain, Acute (Adult)  Goal: Identify Related Risk Factors and Signs and Symptoms  Outcome: Outcome(s) achieved Date Met: 07/10/18    Goal: Acceptable Pain Control/Comfort Level  Outcome: Ongoing (interventions implemented as appropriate)      Problem: Pain, Chronic (Adult)  Goal: Identify Related Risk Factors and Signs and Symptoms  Outcome: Outcome(s) achieved Date Met: 07/10/18    Goal: Acceptable Pain/Comfort Level and Functional Ability  Outcome: Ongoing (interventions implemented as appropriate)

## 2018-07-10 NOTE — THERAPY TREATMENT NOTE
"Acute Care - Physical Therapy Treatment Note  [facility]     Patient Name: Sophie Ace  : 1965  MRN: [id number]  Today's Date: 7/10/2018  Onset of Illness/Injury or Date of Surgery: 18  Date of Referral to PT: 07/10/18  Referring Physician: Vee    Admit Date: 2018    Visit Dx:    ICD-10-CM ICD-9-CM   1. Difficulty walking R26.2 719.7     Patient Active Problem List   Diagnosis   • Pain due to internal orthopedic prosthetic devices, implants and grafts, initial encounter (CMS/[facility])   • Recurrent instability of left knee joint       Therapy Treatment          Rehabilitation Treatment Summary     Row Name 07/10/18 1500             Treatment Time/Intention    Discipline (P)  physical therapy assistant  -ES      Document Type (P)  therapy note (daily note)  -ES      Subjective Information (P)  complains of;pain;numbness  -ES      Care Plan Review (P)  patient/other agree to care plan  -ES      Care Plan Review, Other Participant(s) (P)  family  -ES      Comment (P)  Pt. \"feels like my leg is still numb.\"  -ES      Recorded by [ES] Ilene Mays PTA Student 07/10/18 1531      Row Name 07/10/18 1500             Cognitive Assessment/Intervention- PT/OT    Personal Safety Interventions (P)  fall prevention program maintained;gait belt;nonskid shoes/slippers when out of bed;supervised activity  -ES      Recorded by [ES] Ilene Mays PTA Student 07/10/18 1531      Row Name 07/10/18 1500             Sit-Stand Transfer    Sit-Stand [place] (Transfers) (P)  verbal cues;contact guard  -ES      Assistive Device (Sit-Stand Transfers) (P)  walker, front-wheeled  -ES      Recorded by [ES] Ilene Mays PTA Student 07/10/18 1531      Row Name 07/10/18 1500             Stand-Sit Transfer    Stand-Sit [place] (Transfers) (P)  verbal cues;contact guard  -ES      Assistive Device (Stand-Sit Transfers) (P)  walker, front-wheeled  -ES      Recorded by [ES] Ilene" "[place] [place] Student 07/10/18 1531      Row Name 07/10/18 1500             Gait/Stairs Assessment/Training    [place] Level (Gait) (P)  unable to assess  -ES      Comment (Gait/Stairs) (P)  Pt. requested to amb tomorrow AM due to leg \"feeling numb\"  -ES2      Recorded by [ES] Ilene Mays PTA Student 07/10/18 1531  [ES2] Ilene Mays [facility] [place] Student 07/10/18 1532      Row Name 07/10/18 1500             Left Lower Ext    Lt Knee Extension/Flexion AROM (P)  6-81 degrees  -ES      Recorded by [ES] Ilene Mays PTA Student 07/10/18 1552      Row Name 07/10/18 1500             Therapeutic Exercise    Comment (Therapeutic Exercise) (P)  Left TKR Protocol x 15 reps  -ES      Recorded by [ES] Ilene Mays PTA Student 07/10/18 1552      Row Name 07/10/18 1500             Static Standing Balance    Level of [place] (Supported Standing, Static Balance) (P)  minimal assist, 75% patient effort  -ES      Time Able to Maintain Position (Supported Standing, Static Balance) (P)  1 to 2 minutes  -ES      Assistive Device Utilized (Supported Standing, Static Balance) (P)  rolling walker  -ES      Comment (Supported Standing, Static Balance) (P)  Practiced weight shifting Right to Left leg; assist for safety due to Left LE \"feeling numb\".  -ES      Recorded by [ES] Ilene Mays PTA Student 07/10/18 1535      Row Name 07/10/18 1500             Positioning and Restraints    Pre-Treatment Position (P)  sitting in chair/recliner  -ES      Post Treatment Position (P)  chair  -ES      In Chair (P)  reclined;sitting;call light within reach;encouraged to call for assist;with family/caregiver  -ES      Recorded by [ES] Ilene Mays PTA Student 07/10/18 1535      Row Name 07/10/18 1500             Pain Scale: Numbers Pre/Post-Treatment    Pain Scale: Numbers, Pretreatment (P)  7/10  -ES      Pain Location - Side (P)  Left  -ES      Pain Location (P)  knee  -ES   "    Recorded by [ES] Ilene Mays PTA Student 07/10/18 1535      Row Name 07/10/18 1500             Pain Scale 2: Numbers Pre/Post-Treatment    Pain Intervention(s) 2 (P)  Repositioned;Ambulation/increased activity  -ES      Recorded by [ES] Ilene Mays PTA Student 07/10/18 1536      Row Name                Wound 07/09/18 1759 Left knee incision    Wound - Properties Group Date first assessed: 07/09/18 [KM] Time first assessed: 1759 [KM] Side: Left [KM] Location: knee [KM] Type: incision [KM] Recorded by:  [KM] Nikole Tavares RN 07/09/18 1759      User Key  (r) = Recorded By, (t) = Taken By, (c) = Cosigned By    Initials Name Effective Dates Discipline    KM Nikole Tavares RN 11/27/17 -  Nurse    ES Ilene Mays PTA Student 06/11/18 -  PT          Wound 07/09/18 1759 Left knee incision (Active)   Dressing Appearance dry;intact 7/10/2018 12:37 PM   Drainage Amount none 7/10/2018 12:23 AM   Dressing Care, Wound elastic bandage 7/10/2018 12:23 AM               PT Rehab Goals     Row Name 07/10/18 1100             Transfer Goal 1 (PT)    Activity/Assistive Device (Transfer Goal 1, PT) transfers, all;walker, rolling  -EJ      [place] Level/Cues Needed (Transfer Goal 1, PT) standby assist  -EJ      Time Frame (Transfer Goal 1, PT) 3 days  -EJ         Gait Training Goal 1 (PT)    Activity/Assistive Device (Gait Training Goal 1, PT) gait (walking locomotion);walker, rolling  -EJ      [place] Level (Gait Training Goal 1, PT) standby assist  -EJ      Distance (Gait Goal 1, PT) 100  -EJ      Time Frame (Gait Training Goal 1, PT) 3 days  -EJ         ROM Goal 1 (PT)    ROM Goal 1 (PT) L knee 5-90  -EJ      Time Frame (ROM Goal 1, PT) 3 days  -EJ         Stairs Goal 1 (PT)    Activity/Assistive Device (Stairs Goal 1, PT) stairs, all skills  -EJ      [place] Level/Cues Needed (Stairs Goal 1, PT) contact guard assist  -EJ      Number of Stairs (Stairs Goal 1, PT) 4  -EJ       Time Frame (Stairs Goal 1, PT) 3 days  -        User Key  (r) = Recorded By, (t) = Taken By, (c) = Cosigned By    Initials Name Provider Type Discipline     Erika Adams, PT Physical Therapist PT          Physical Therapy Education     Title: PT OT SLP Therapies (Done)     Topic: Physical Therapy (Done)     Point: Mobility training (Done)    Learning Progress Summary     Learner Status Readiness Method Response Comment Documented by    Patient Done Acceptance E VU  ES 07/10/18 1536     Done Acceptance E,TB,D VU,NR   07/10/18 1122     Done Acceptance E,TB VU,DU   07/10/18 0022    Family Done Acceptance E VU  ES 07/10/18 1536     Done Acceptance E,TB,D VU,NR   07/10/18 1122          Point: Home exercise program (Done)    Learning Progress Summary     Learner Status Readiness Method Response Comment Documented by    Patient Done Acceptance E VU  ES 07/10/18 1536     Done Acceptance E,TB,D VU,NR   07/10/18 1122    Family Done Acceptance E VU  ES 07/10/18 1536     Done Acceptance E,TB,D VU,NR   07/10/18 1122          Point: Body mechanics (Done)    Learning Progress Summary     Learner Status Readiness Method Response Comment Documented by    Patient Done Acceptance E VU  ES 07/10/18 1536     Done Acceptance E,TB VU,DU   07/10/18 0022    Family Done Acceptance E VU  ES 07/10/18 1536          Point: Precautions (Done)    Learning Progress Summary     Learner Status Readiness Method Response Comment Documented by    Patient Done Acceptance E VU  ES 07/10/18 1536     Done Acceptance E,TB VU,DU   07/10/18 0022    Family Done Acceptance E VU  ES 07/10/18 1536                      User Key     Initials Effective Dates Name Provider Type Discipline     04/03/18 -  Erika Adams, PT Physical Therapist PT     12/01/16 -  Tanna Arriaga RN Registered Nurse Nurse     06/11/18 -  Ilene Mays, PTA Student PTA Student PT                    PT Recommendation and Plan     Plan of Care Reviewed  "With: (P) patient, family  Progress: (P) improving  Outcome Summary: (P) Pt incr ther ex; stood practiced weight shifting in place from Right to Left LE due to pt c/o leg \"feeling numb and not safe to walk\". Needs to perform stairs to go home safely. D/C w/ fam and HHPT.          Outcome Measures     Row Name 07/10/18 1100             How much help from another person do you currently need...    Turning from your back to your side while in flat bed without using bedrails? 3  -EJ      Moving from lying on back to sitting on the side of a flat bed without bedrails? 3  -EJ      Moving to and from a bed to a chair (including a wheelchair)? 3  -EJ      Standing up from a chair using your arms (e.g., wheelchair, bedside chair)? 3  -EJ      Climbing 3-5 steps with a railing? 2  -EJ      To walk in hospital room? 3  -EJ      AM-PAC 6 Clicks Score 17  -EJ         Functional Assessment    Outcome Measure Options AM-PAC 6 Clicks Basic Mobility (PT)  -EJ        User Key  (r) = Recorded By, (t) = Taken By, (c) = Cosigned By    Initials Name Provider Type    SHELBY Adams, PT Physical Therapist           Time Calculation:         PT Charges     Row Name 07/10/18 1539 07/10/18 1131          Time Calculation    Start Time (P)  1310  -ES 1100  -EJ     Stop Time (P)  1430  -ES 1120  -EJ     Time Calculation (min) (P)  80 min  -ES 20 min  -EJ     PT Received On (P)  07/10/18  -ES 07/10/18  -EJ     PT - Next Appointment (P)  07/11/18  -ES 07/10/18  -EJ     PT Goal Re-Cert Due Date  -- 07/13/18  -EJ        Time Calculation- PT    Total Timed Code Minutes- PT (P)  15 minute(s)  -ES  --        Timed Charges    49262 - PT Therapeutic Exercise Minutes  -- 10  -EJ       User Key  (r) = Recorded By, (t) = Taken By, (c) = Cosigned By    Initials Name Provider Type    SHELBY Adams, PT Physical Therapist    ES Ilene Mays, PTA Student PTA Student        Therapy Suggested Charges     Code   Minutes Charges    84952 " (CPT®) Hc Pt Neuromusc Re Education Ea 15 Min      70651 (CPT®) Hc Pt Ther Proc Ea 15 Min 10 1    83044 (CPT®) Hc Gait Training Ea 15 Min      66097 (CPT®) Hc Pt Therapeutic Act Ea 15 Min      16891 (CPT®) Hc Pt Manual Therapy Ea 15 Min      92263 (CPT®) Hc Pt Iontophoresis Ea 15 Min      74849 (CPT®) Hc Pt Elec Stim Ea-Per 15 Min      38060 (CPT®) Hc Pt Ultrasound Ea 15 Min      20393 (CPT®) Hc Pt Self Care/Mgmt/Train Ea 15 Min      Total  10 1        Therapy Charges for Today     Code Description Service Date Service Provider Modifiers Qty    65418620825 HC PT THER PROC EA 15 MIN 7/10/2018 Ilene Mays PTA Student GP 1    18390701893 HC PT THER PROC GROUP 7/10/2018 Ilene Mays PTA Student GP 1          PT G-Codes  Outcome Measure Options: AM-PAC 6 Clicks Basic Mobility (PT)    Ilene Mays PTA Student  7/10/2018

## 2018-07-10 NOTE — PLAN OF CARE
Problem: Patient Care Overview  Goal: Plan of Care Review  Outcome: Ongoing (interventions implemented as appropriate)   07/10/18 1122   Coping/Psychosocial   Plan of Care Reviewed With patient;spouse   Plan of Care Review   Progress improving   OTHER   Outcome Summary Pt doing well, s/p L TKR revision. She reports she still has numbness in LLE. She was able to complete total knee exercises and take several steps to transfer to chair. Possible DC later today or tomorrow. Plan to see again this pm. Pt will continue ot benefit from skilled PT to maximize safety and independence with mobility.,

## 2018-07-10 NOTE — THERAPY TREATMENT NOTE
"Acute Care - Physical Therapy Treatment Note  [facility]     Patient Name: Sophie Ace  : 1965  MRN: [id number]  Today's Date: 7/10/2018  Onset of Illness/Injury or Date of Surgery: 18  Date of Referral to PT: 07/10/18  Referring Physician: Vee    Admit Date: 2018    Visit Dx:    ICD-10-CM ICD-9-CM   1. Difficulty walking R26.2 719.7     Patient Active Problem List   Diagnosis   • Pain due to internal orthopedic prosthetic devices, implants and grafts, initial encounter (CMS/[facility])   • Recurrent instability of left knee joint       Therapy Treatment          Rehabilitation Treatment Summary     Row Name 07/10/18 1500             Treatment Time/Intention    Discipline (P)  physical therapy assistant  -ES      Document Type (P)  therapy note (daily note)  -ES      Subjective Information (P)  complains of;pain;numbness  -ES      Care Plan Review (P)  patient/other agree to care plan  -ES      Care Plan Review, Other Participant(s) (P)  family  -ES      Comment (P)  Pt. \"feels like my leg is still numb.\"  -ES      Recorded by [ES] Ilene Mays PTA Student 07/10/18 1531      Row Name 07/10/18 1500             Cognitive Assessment/Intervention- PT/OT    Personal Safety Interventions (P)  fall prevention program maintained;gait belt;nonskid shoes/slippers when out of bed;supervised activity  -ES      Recorded by [ES] Ilene Mays PTA Student 07/10/18 1531      Row Name 07/10/18 1500             Sit-Stand Transfer    Sit-Stand [place] (Transfers) (P)  verbal cues;contact guard  -ES      Assistive Device (Sit-Stand Transfers) (P)  walker, front-wheeled  -ES      Recorded by [ES] Ilene Mays PTA Student 07/10/18 1531      Row Name 07/10/18 1500             Stand-Sit Transfer    Stand-Sit [place] (Transfers) (P)  verbal cues;contact guard  -ES      Assistive Device (Stand-Sit Transfers) (P)  walker, front-wheeled  -ES      Recorded by [ES] Ilene" "Tabatha, [facility] Student 07/10/18 1531      Row Name 07/10/18 1500             Gait/Stairs Assessment/Training    [place] Level (Gait) (P)  unable to assess  -ES      Comment (Gait/Stairs) (P)  Pt. requested to amb tomorrow AM due to leg \"feeling numb\"  -ES2      Recorded by [ES] Ilene Mays, JOSELO Student 07/10/18 1531  [ES2] Ilene Mays, [facility] Student 07/10/18 1532      Row Name 07/10/18 1500             Static Standing Balance    Level of [place] (Supported Standing, Static Balance) (P)  minimal assist, 75% patient effort  -ES      Time Able to Maintain Position (Supported Standing, Static Balance) (P)  1 to 2 minutes  -ES      Assistive Device Utilized (Supported Standing, Static Balance) (P)  rolling walker  -ES      Comment (Supported Standing, Static Balance) (P)  Practiced weight shifting Right to Left leg; assist for safety due to Left LE \"feeling numb\".  -ES      Recorded by [ES] Ilene Mays, [facility] Student 07/10/18 1535      Row Name 07/10/18 1500             Positioning and Restraints    Pre-Treatment Position (P)  sitting in chair/recliner  -ES      Post Treatment Position (P)  chair  -ES      In Chair (P)  reclined;sitting;call light within reach;encouraged to call for assist;with family/caregiver  -ES      Recorded by [ES] Ilene Mays PTA Student 07/10/18 1535      Row Name 07/10/18 1500             Pain Scale: Numbers Pre/Post-Treatment    Pain Scale: Numbers, Pretreatment (P)  7/10  -ES      Pain Location - Side (P)  Left  -ES      Pain Location (P)  knee  -ES      Recorded by [ES] Ilene Mays PTA Student 07/10/18 1535      Row Name 07/10/18 1500             Pain Scale 2: Numbers Pre/Post-Treatment    Pain Intervention(s) 2 (P)  Repositioned;Ambulation/increased activity  -ES      Recorded by [ES] Ilene Mays PTA Student 07/10/18 1536      Row Name                Wound 07/09/18 1759 Left knee incision    Wound - " Properties Group Date first assessed: 07/09/18 [KM] Time first assessed: 1759 [KM] Side: Left [KM] Location: knee [KM] Type: incision [KM] Recorded by:  [KM] Nikole Tavares RN 07/09/18 1759      User Key  (r) = Recorded By, (t) = Taken By, (c) = Cosigned By    Initials Name Effective Dates Discipline    KM Nikole Tavares RN 11/27/17 -  Nurse    URIAH Mays PTA Student 06/11/18 -  PT          Wound 07/09/18 1759 Left knee incision (Active)   Dressing Appearance dry;intact 7/10/2018 12:37 PM   Drainage Amount none 7/10/2018 12:23 AM   Dressing Care, Wound elastic bandage 7/10/2018 12:23 AM               PT Rehab Goals     Row Name 07/10/18 1100             Transfer Goal 1 (PT)    Activity/Assistive Device (Transfer Goal 1, PT) transfers, all;walker, rolling  -EJ      [place] Level/Cues Needed (Transfer Goal 1, PT) standby assist  -EJ      Time Frame (Transfer Goal 1, PT) 3 days  -EJ         Gait Training Goal 1 (PT)    Activity/Assistive Device (Gait Training Goal 1, PT) gait (walking locomotion);walker, rolling  -EJ      [place] Level (Gait Training Goal 1, PT) standby assist  -EJ      Distance (Gait Goal 1, PT) 100  -EJ      Time Frame (Gait Training Goal 1, PT) 3 days  -EJ         ROM Goal 1 (PT)    ROM Goal 1 (PT) L knee 5-90  -EJ      Time Frame (ROM Goal 1, PT) 3 days  -EJ         Stairs Goal 1 (PT)    Activity/Assistive Device (Stairs Goal 1, PT) stairs, all skills  -EJ      [place] Level/Cues Needed (Stairs Goal 1, PT) contact guard assist  -EJ      Number of Stairs (Stairs Goal 1, PT) 4  -EJ      Time Frame (Stairs Goal 1, PT) 3 days  -EJ        User Key  (r) = Recorded By, (t) = Taken By, (c) = Cosigned By    Initials Name Provider Type Discipline    EJ Erika Adams PT Physical Therapist PT          Physical Therapy Education     Title: PT OT SLP Therapies (Done)     Topic: Physical Therapy (Done)     Point: Mobility training (Done)    Learning Progress Summary      "Learner Status Readiness Method Response Comment Documented by    Patient Done Acceptance E VU  ES 07/10/18 1536     Done Acceptance E,TB,D VU,NR   07/10/18 1122     Done Acceptance E,TB VU,DU   07/10/18 0022    Family Done Acceptance E VU  ES 07/10/18 1536     Done Acceptance E,TB,D VU,NR   07/10/18 1122          Point: Home exercise program (Done)    Learning Progress Summary     Learner Status Readiness Method Response Comment Documented by    Patient Done Acceptance E VU  ES 07/10/18 1536     Done Acceptance E,TB,D VU,NR   07/10/18 1122    Family Done Acceptance E VU  ES 07/10/18 1536     Done Acceptance E,TB,D VU,NR   07/10/18 1122          Point: Body mechanics (Done)    Learning Progress Summary     Learner Status Readiness Method Response Comment Documented by    Patient Done Acceptance E VU  ES 07/10/18 1536     Done Acceptance E,TB VU,DU   07/10/18 0022    Family Done Acceptance E VU  ES 07/10/18 1536          Point: Precautions (Done)    Learning Progress Summary     Learner Status Readiness Method Response Comment Documented by    Patient Done Acceptance E VU  ES 07/10/18 1536     Done Acceptance E,TB VU,DU   07/10/18 0022    Family Done Acceptance E VU  ES 07/10/18 1536                      User Key     Initials Effective Dates Name Provider Type Discipline     04/03/18 -  Erika Adams, PT Physical Therapist PT     12/01/16 -  Tanna Arriaga RN Registered Nurse Nurse     06/11/18 -  Ilene Mays, PTA Student PTA Student PT                    PT Recommendation and Plan     Plan of Care Reviewed With: (P) patient, family  Progress: (P) improving  Outcome Summary: (P) Pt incr ther ex; stood practiced weight shifting in place from Right to Left LE due to pt c/o leg \"feeling numb and not safe to walk\". Needs to perform stairs to go home safely. D/C w/ fam and HHPT.          Outcome Measures     Row Name 07/10/18 1100             How much help from another person do you " currently need...    Turning from your back to your side while in flat bed without using bedrails? 3  -EJ      Moving from lying on back to sitting on the side of a flat bed without bedrails? 3  -EJ      Moving to and from a bed to a chair (including a wheelchair)? 3  -EJ      Standing up from a chair using your arms (e.g., wheelchair, bedside chair)? 3  -EJ      Climbing 3-5 steps with a railing? 2  -EJ      To walk in hospital room? 3  -EJ      AM-PAC 6 Clicks Score 17  -EJ         Functional Assessment    Outcome Measure Options AM-PAC 6 Clicks Basic Mobility (PT)  -EJ        User Key  (r) = Recorded By, (t) = Taken By, (c) = Cosigned By    Initials Name Provider Type    SHELBY Adams, JESÚS Physical Therapist           Time Calculation:         PT Charges     Row Name 07/10/18 1539 07/10/18 1131          Time Calculation    Start Time (P)  1310  -ES 1100  -EJ     Stop Time (P)  1430  -ES 1120  -EJ     Time Calculation (min) (P)  80 min  -ES 20 min  -EJ     PT Received On (P)  07/10/18  -ES 07/10/18  -EJ     PT - Next Appointment (P)  07/11/18  -ES 07/10/18  -EJ     PT Goal Re-Cert Due Date  -- 07/13/18  -EJ        Time Calculation- PT    Total Timed Code Minutes- PT (P)  15 minute(s)  -ES  --        Timed Charges    67553 - PT Therapeutic Exercise Minutes  -- 10  -EJ       User Key  (r) = Recorded By, (t) = Taken By, (c) = Cosigned By    Initials Name Provider Type    SHELBY Adams, PT Physical Therapist    URIAH Mays, PTA Student PTA Student        Therapy Suggested Charges     Code   Minutes Charges    44469 (CPT®) Hc Pt Neuromusc Re Education Ea 15 Min      56252 (CPT®) Hc Pt Ther Proc Ea 15 Min 10 1    06491 (CPT®) Hc Gait Training Ea 15 Min      57660 (CPT®) Hc Pt Therapeutic Act Ea 15 Min      65013 (CPT®) Hc Pt Manual Therapy Ea 15 Min      63336 (CPT®) Hc Pt Iontophoresis Ea 15 Min      84486 (CPT®) Hc Pt Elec Stim Ea-Per 15 Min      17506 (CPT®) Hc Pt Ultrasound Ea 15 Min       38556 (CPT®) Hc Pt Self Care/Mgmt/Train Ea 15 Min      Total  10 1        Therapy Charges for Today     Code Description Service Date Service Provider Modifiers Qty    18859443449 HC PT THER PROC EA 15 MIN 7/10/2018 Ilene Mays PTA Student GP 1    16605210075 HC PT THER PROC GROUP 7/10/2018 Ilene Mays PTA Student GP 1          PT G-Codes  Outcome Measure Options: AM-PAC 6 Clicks Basic Mobility (PT)    Ilene Mays PTA Student  7/10/2018

## 2018-07-11 VITALS
DIASTOLIC BLOOD PRESSURE: 73 MMHG | BODY MASS INDEX: 41.37 KG/M2 | TEMPERATURE: 96.9 F | SYSTOLIC BLOOD PRESSURE: 113 MMHG | RESPIRATION RATE: 16 BRPM | OXYGEN SATURATION: 98 % | HEART RATE: 70 BPM | HEIGHT: 64 IN | WEIGHT: 242.31 LBS

## 2018-07-11 PROBLEM — R26.2 DIFFICULTY WALKING: Status: ACTIVE | Noted: 2018-07-11

## 2018-07-11 PROCEDURE — 97110 THERAPEUTIC EXERCISES: CPT

## 2018-07-11 PROCEDURE — 97150 GROUP THERAPEUTIC PROCEDURES: CPT

## 2018-07-11 PROCEDURE — 25010000002 HYDROMORPHONE PER 4 MG: Performed by: ORTHOPAEDIC SURGERY

## 2018-07-11 RX ADMIN — CALCIUM 500 MG: 500 TABLET ORAL at 08:28

## 2018-07-11 RX ADMIN — DULOXETINE HYDROCHLORIDE 120 MG: 60 CAPSULE, DELAYED RELEASE ORAL at 07:26

## 2018-07-11 RX ADMIN — OXYCODONE HYDROCHLORIDE AND ACETAMINOPHEN 2 TABLET: 7.5; 325 TABLET ORAL at 20:18

## 2018-07-11 RX ADMIN — APIXABAN 2.5 MG: 2.5 TABLET, FILM COATED ORAL at 08:28

## 2018-07-11 RX ADMIN — PANTOPRAZOLE SODIUM 40 MG: 40 TABLET, DELAYED RELEASE ORAL at 07:26

## 2018-07-11 RX ADMIN — OXYCODONE HYDROCHLORIDE AND ACETAMINOPHEN 2 TABLET: 7.5; 325 TABLET ORAL at 16:09

## 2018-07-11 RX ADMIN — FLUOXETINE HYDROCHLORIDE 20 MG: 20 CAPSULE ORAL at 07:26

## 2018-07-11 RX ADMIN — OXYCODONE HYDROCHLORIDE AND ACETAMINOPHEN 2 TABLET: 7.5; 325 TABLET ORAL at 00:48

## 2018-07-11 RX ADMIN — OXYCODONE HYDROCHLORIDE AND ACETAMINOPHEN 2 TABLET: 7.5; 325 TABLET ORAL at 07:26

## 2018-07-11 RX ADMIN — FLUTICASONE PROPIONATE 2 SPRAY: 50 SPRAY, METERED NASAL at 07:27

## 2018-07-11 RX ADMIN — VITAMIN E CAP 400 UNIT 400 UNITS: 400 CAP at 07:26

## 2018-07-11 RX ADMIN — OXYCODONE HYDROCHLORIDE AND ACETAMINOPHEN 2 TABLET: 7.5; 325 TABLET ORAL at 12:08

## 2018-07-11 RX ADMIN — HYDROMORPHONE HYDROCHLORIDE 0.5 MG: 1 INJECTION, SOLUTION INTRAMUSCULAR; INTRAVENOUS; SUBCUTANEOUS at 08:28

## 2018-07-11 NOTE — THERAPY TREATMENT NOTE
"Acute Care - Physical Therapy Treatment Note  [facility]     Patient Name: Sophie Ace  : 1965  MRN: [id number]  Today's Date: 2018  Onset of Illness/Injury or Date of Surgery: 18  Date of Referral to PT: 07/10/18  Referring Physician: Vee    Admit Date: 2018    Visit Dx:    ICD-10-CM ICD-9-CM   1. Difficulty walking R26.2 719.7     Patient Active Problem List   Diagnosis   • Pain due to internal orthopedic prosthetic devices, implants and grafts, initial encounter (CMS/[facility])   • Recurrent instability of left knee joint   • Difficulty walking       Therapy Treatment          Rehabilitation Treatment Summary     Row Name 18 1300 18 0800          Treatment Time/Intention    Discipline (P)  physical therapy assistant  -ES physical therapy assistant  -MS,ES,MS2     Document Type (P)  therapy note (daily note)  -ES therapy note (daily note);discharge treatment  -MS,ES,MS2     Subjective Information (P)  complains of;pain  -ES2 complains of;pain   soreness  -MS,ES,MS2     Care Plan Review (P)  patient/other agree to care plan  -ES2 patient/other agree to care plan  -MS,ES,MS2     Care Plan Review, Other Participant(s) (P)  family  -ES2 family  -MS,ES,MS2     Comment (P)  Pt c/o \"leg feeling sore this afternoon and back of my leg feels like it is burning\", but agreeable to work w/ PT  -ES2 Pt. \"feels like the nerve block is gone, but my leg is hurting more today\"  -MS,ES,MS2     Recorded by [ES] Ilene Mays PTA Student 18 1324  [ES2] Ilene Mays, JOSELO Student 18 1631 [MS,ES,MS2] Fermin Soria, PT (r) Ilene Mays PTA Student (t) Fermin Soria, PT (c) 18 1158     Row Name 18 1300 18 0800          Cognitive Assessment/Intervention- PT/OT    Personal Safety Interventions (P)  fall prevention program maintained;gait belt;nonskid shoes/slippers when out of bed;supervised activity  - fall prevention program " maintained;gait belt;nonskid shoes/slippers when out of bed;supervised activity  -MS,ES,MS2     Recorded by [ES] Ilene Mays PTA Student 07/11/18 1631 [MS,ES,MS2] Fermin Soria, PT (r) Ilene Mays [facility] Student (t) Fermin Soria, PT (c) 07/11/18 1158     Row Name 07/11/18 1300 07/11/18 0800          Sit-Stand Transfer    Sit-Stand Bow[place] (Transfers) (P)  verbal cues;contact guard  -ES contact guard  -MS,ES,MS2     Assistive Device (Sit-Stand Transfers) (P)  walker, front-wheeled  -ES walker, front-wheeled  -MS,ES,MS2     Recorded by [ES] Ilene Mays, JOSELO Student 07/11/18 1631 [MS,ES,MS2] Fermin Soria, PT (r) Ilene Mays [facility] Student (t) Fermin Soria, PT (c) 07/11/18 1158     Row Name 07/11/18 1300 07/11/18 0800          Stand-Sit Transfer    Stand-Sit Bow[place] (Transfers) (P)  verbal cues;contact guard  -ES contact guard  -MS,ES,MS2     Assistive Device (Stand-Sit Transfers) (P)  walker, front-wheeled  -ES walker, front-wheeled  -MS,ES,MS2     Recorded by [ES] Ilene Mays PTA Student 07/11/18 1631 [MS,ES,MS2] Fermin Soria, PT (r) Ilene Mays, [facility] Student (t) Fermin Soria, PT (c) 07/11/18 1158     Row Name 07/11/18 1300 07/11/18 0800          Gait/Stairs Assessment/Training    Bowler Level (Gait) (P)  verbal cues;contact guard  -ES contact guard  -MS,ES,MS2     Assistive Device (Gait) (P)  walker, front-wheeled  -ES walker, front-wheeled  -MS,ES,MS2     Distance in Feet (Gait) (P)  80 feet  -ES 60 feet  -MS,ES,MS2     Deviations/Abnormal Patterns (Gait) (P)  gait speed decreased;antalgic  -ES gait speed decreased  -MS,ES,MS2     Bilateral Gait Deviations (P)  forward flexed posture  -ES  --     Bowler Level (Stairs)  -- verbal cues;contact guard  -MS,ES,MS2     Handrail Location (Stairs)  -- right side (ascending)  -MS,ES,MS2     Number of Steps (Stairs)  -- 25 stair steps  -MS,ES,MS2     Ascending  Technique (Stairs)  -- step-to-step  -MS,ES,MS2     Descending Technique (Stairs)  -- step-to-step  -MS,ES,MS2     Comment (Gait/Stairs) (P)  Pt requires verbal/tactile cues for STS/STS for safety and posture correction  -ES Pt completed gym stairs forward x2 and bckwrd w/ min assist for safety  -MS,ES,MS2     Recorded by [ES] Ilene aMys PTA Student 07/11/18 1631 [MS,ES,MS2] Fermin Soria, PT (r) Ilene Mays PTA Student (t) Fermin Soria, PT (c) 07/11/18 1158     Row Name 07/11/18 0800             Left Lower Ext    Lt Knee Extension/Flexion AROM 7-80 degrees  -MS,ES,MS2      Recorded by [MS,ES,MS2] Fermin Soria, PT (r) Ilene Mays PTA Student (t) Fermin Soria, PT (c) 07/11/18 1158      Row Name 07/11/18 1300 07/11/18 0800          Therapeutic Exercise    Comment (Therapeutic Exercise) (P)  Left TKR Protocol 25 x reps  -ES Left TKR Protocol x 20 reps  -MS,ES,MS2     Recorded by [ES] Ilene Mays PTA Student 07/11/18 1631 [MS,ES,MS2] Fermin Soria, PT (r) Ilene Mays PTA Student (t) Fermin Soria, PT (c) 07/11/18 1158     Row Name 07/11/18 1300             Positioning and Restraints    Pre-Treatment Position (P)  sitting in chair/recliner  -ES      Post Treatment Position (P)  bed  -ES      In Bed (P)  fowlers;call light within reach;encouraged to call for assist;with family/caregiver  -ES      Recorded by [ES] Ilene Mays PTA Student 07/11/18 1631      Row Name 07/11/18 1300 07/11/18 0800          Pain Scale: Numbers Pre/Post-Treatment    Pain Scale: Numbers, Pretreatment (P)  7/10  -ES 8/10  -MS,ES,MS2     Pain Location - Side (P)  Left  -ES Left  -MS,ES,MS2     Pain Location (P)  knee  -ES knee  -MS,ES,MS2     Recorded by [ES] Ilene Mays PTA Student 07/11/18 1631 [MS,ES,MS2] Fermin Soria, PT (r) Ilene Mays PTA Student (t) Fermin Soria, PT (c) 07/11/18 1158     Row Name 07/11/18 1300  07/11/18 0800          Pain Scale 2: Numbers Pre/Post-Treatment    Pain Intervention(s) 2 (P)  Medication (See MAR);Repositioned;Ambulation/increased activity  -ES Repositioned;Ambulation/increased activity  -MS,ES,MS2     Recorded by [ES] Ilene Mays PTA Student 07/11/18 1631 [MS,ES,MS2] Fermin Soria, PT (r) Ilene Mays PTA Student (t) Fermin Soria, PT (c) 07/11/18 1158     Row Name                Wound 07/09/18 1759 Left knee incision    Wound - Properties Group Date first assessed: 07/09/18 [KM] Time first assessed: 1759 [KM] Side: Left [KM] Location: knee [KM] Type: incision [KM] Recorded by:  [KM] Nikole Tavares RN 07/09/18 1759      User Key  (r) = Recorded By, (t) = Taken By, (c) = Cosigned By    Initials Name Effective Dates Discipline     Nikole Tavares RN 11/27/17 -  Nurse    MS Fermin Soria, PT 04/03/18 -  PT    ES Ilene Mays PTA Student 06/11/18 -  PT          Wound 07/09/18 1759 Left knee incision (Active)   Dressing Appearance dry;intact 7/11/2018 12:08 PM   Closure DORIE 7/11/2018 12:08 PM             Physical Therapy Education     Title: PT OT SLP Therapies (Done)     Topic: Physical Therapy (Done)     Point: Mobility training (Done)    Learning Progress Summary     Learner Status Readiness Method Response Comment Documented by    Patient Done Acceptance E VU  ES 07/11/18 1109     Done Acceptance E VU  ES 07/10/18 1536     Done Acceptance E,TB,D VU,NR  EJ 07/10/18 1122     Done Acceptance ELAWRENCE DU   07/10/18 0022    Family Done Acceptance E VU  ES 07/11/18 1109     Done Acceptance E VU  ES 07/10/18 1536     Done Acceptance E,TB,D VU,NR  EJ 07/10/18 1122          Point: Home exercise program (Done)    Learning Progress Summary     Learner Status Readiness Method Response Comment Documented by    Patient Done Acceptance E VU  ES 07/11/18 1109     Done Acceptance E VU  ES 07/10/18 1536     Done Acceptance E,LAWRENCE,D TIFFANIE RAMIREZ 07/10/18 1122     Family Done Acceptance E VU  ES 07/11/18 1109     Done Acceptance E VU  ES 07/10/18 1536     Done Acceptance E,TB,D VU,NR  EJ 07/10/18 1122          Point: Body mechanics (Done)    Learning Progress Summary     Learner Status Readiness Method Response Comment Documented by    Patient Done Acceptance E VU  ES 07/11/18 1109     Done Acceptance E VU  ES 07/10/18 1536     Done Acceptance E,TB VU,DU   07/10/18 0022    Family Done Acceptance E VU  ES 07/11/18 1109     Done Acceptance E VU  ES 07/10/18 1536          Point: Precautions (Done)    Learning Progress Summary     Learner Status Readiness Method Response Comment Documented by    Patient Done Acceptance E VU  ES 07/11/18 1109     Done Acceptance E VU  ES 07/10/18 1536     Done Acceptance E,TB VU,DU  AH 07/10/18 0022    Family Done Acceptance E VU  ES 07/11/18 1109     Done Acceptance E VU  ES 07/10/18 1536                      User Key     Initials Effective Dates Name Provider Type Discipline     04/03/18 -  Erika Adams, PT Physical Therapist PT     12/01/16 -  Tanna Arriaga RN Registered Nurse Nurse     06/11/18 -  Ilene Mays, PTA Student PTA Student PT                    PT Recommendation and Plan  Anticipated Discharge Disposition (PT): home, home with assist, home with home health  Outcome Summary/Treatment Plan (PT)  Anticipated Discharge Disposition (PT): home, home with assist, home with home health  Plan of Care Reviewed With: patient, family  Progress: improving  Outcome Summary: Pt incr TKR protocol reps and incr amb dist and completed gym stairs forward x2 and backward w/  min assist for safety.  Swelling and soreness of Left LE is limiting AROM and mobility.          Outcome Measures     Row Name 07/11/18 1100 07/10/18 1100          How much help from another person do you currently need...    Turning from your back to your side while in flat bed without using bedrails? 3  -MS (r) ES (t) MS (c) 3  -EJ     Moving from lying on  back to sitting on the side of a flat bed without bedrails? 3  -MS (r) ES (t) MS (c) 3  -EJ     Moving to and from a bed to a chair (including a wheelchair)? 4  -MS (r) ES (t) MS (c) 3  -EJ     Standing up from a chair using your arms (e.g., wheelchair, bedside chair)? 4  -MS (r) ES (t) MS (c) 3  -EJ     Climbing 3-5 steps with a railing? 3  -MS (r) ES (t) MS (c) 2  -EJ     To walk in hospital room? 4  -MS (r) ES (t) MS (c) 3  -EJ     AM-PAC 6 Clicks Score 21  -MS (r) ES (t) 17  -EJ        Functional Assessment    Outcome Measure Options AM-PAC 6 Clicks Basic Mobility (PT)  -MS (r) ES (t) MS (c) AM-PAC 6 Clicks Basic Mobility (PT)  -EJ       User Key  (r) = Recorded By, (t) = Taken By, (c) = Cosigned By    Initials Name Provider Type    MS Fermin Soria, PT Physical Therapist    EJ Erika Adams, PT Physical Therapist    ES Ilene Mays, PTA Student PTA Student           Time Calculation:         PT Charges     Row Name 07/11/18 1632 07/11/18 1113          Time Calculation    Start Time (P)  1312  -ES 0844  -MS (r) ES (t) MS (c)     Stop Time (P)  1350  -ES 0945  -MS (r) ES (t) MS (c)     Time Calculation (min) (P)  38 min  -ES 61 min  -MS (r) ES (t)     PT Received On (P)  07/11/18  -ES  --     PT - Next Appointment (P)  07/12/18  -ES  --     PT Goal Re-Cert Due Date  -- 07/11/18  -MS (r) ES (t) MS (c)        Time Calculation- PT    Total Timed Code Minutes- PT (P)  15 minute(s)  -ES 23 minute(s)  -MS (r) ES (t) MS (c)       User Key  (r) = Recorded By, (t) = Taken By, (c) = Cosigned By    Initials Name Provider Type    MS Fermin Soria, PT Physical Therapist    ES Ilene Solorza-Wirey, PTA Student PTA Student        Therapy Suggested Charges     Code   Minutes Charges    24444 (CPT®) Hc Pt Neuromusc Re Education Ea 15 Min      20314 (CPT®) Hc Pt Ther Proc Ea 15 Min 10 1    83268 (CPT®) Hc Gait Training Ea 15 Min      99430 (CPT®) Hc Pt Therapeutic Act Ea 15 Min      20670 (CPT®) Hc Pt  Manual Therapy Ea 15 Min      90456 (CPT®) Hc Pt Iontophoresis Ea 15 Min      37261 (CPT®) Hc Pt Elec Stim Ea-Per 15 Min      40934 (CPT®) Hc Pt Ultrasound Ea 15 Min      81584 (CPT®) Hc Pt Self Care/Mgmt/Train Ea 15 Min      Total  10 1        Therapy Charges for Today     Code Description Service Date Service Provider Modifiers Qty    44703071008 HC PT THER PROC EA 15 MIN 7/10/2018 Ilene Mays, PTA Student GP 1    85992490630 HC PT THER PROC GROUP 7/10/2018 Ilene Mays, PTA Student GP 1    93589488407 HC PT THER PROC EA 15 MIN 7/11/2018 Ilene Mays, PTA Student GP 2    48835898595 HC PT THER PROC GROUP 7/11/2018 Ilene Mays, PTA Student GP 1    54576289367 HC PT THER PROC EA 15 MIN 7/11/2018 Ilene Mays, PTA Student GP 1    70992971709 HC PT THER PROC GROUP 7/11/2018 Ilene Mays, PTA Student GP 1          PT G-Codes  Outcome Measure Options: AM-PAC 6 Clicks Basic Mobility (PT)    Ilene Mays PTA Student  7/11/2018

## 2018-07-11 NOTE — THERAPY DISCHARGE NOTE
Acute Care - Physical Therapy Treatment Note/Discharge  [facility]     Patient Name: Sophie Ace  : 1965  MRN: [id number]  Today's Date: 2018  Onset of Illness/Injury or Date of Surgery: 18  Date of Referral to PT: 07/10/18  Referring Physician: Vee    Admit Date: 2018    Visit Dx:    ICD-10-CM ICD-9-CM   1. Difficulty walking R26.2 719.7     Patient Active Problem List   Diagnosis   • Pain due to internal orthopedic prosthetic devices, implants and grafts, initial encounter (CMS/[facility])   • Recurrent instability of left knee joint       Physical Therapy Education     Title: PT OT SLP Therapies (Done)     Topic: Physical Therapy (Done)     Point: Mobility training (Done)    Learning Progress Summary     Learner Status Readiness Method Response Comment Documented by    Patient Done Acceptance E VU  ES 18 1109     Done Acceptance E VU  ES 07/10/18 1536     Done Acceptance E,TB,D VU,NR   07/10/18 1122     Done Acceptance E,TB VU,EM   07/10/18 0022    Family Done Acceptance E VU  ES 18 1109     Done Acceptance E VU  ES 07/10/18 1536     Done Acceptance E,TB,D VU,NR   07/10/18 1122          Point: Home exercise program (Done)    Learning Progress Summary     Learner Status Readiness Method Response Comment Documented by    Patient Done Acceptance E VU  ES 18 1109     Done Acceptance E VU  ES 07/10/18 1536     Done Acceptance E,TB,D VU,NR   07/10/18 1122    Family Done Acceptance E VU  ES 18 1109     Done Acceptance E VU  ES 07/10/18 1536     Done Acceptance E,TB,D VU,NR   07/10/18 1122          Point: Body mechanics (Done)    Learning Progress Summary     Learner Status Readiness Method Response Comment Documented by    Patient Done Acceptance E VU  ES 18 1109     Done Acceptance E VU  ES 07/10/18 1536     Done Acceptance E,TB VU,DU   07/10/18 0022    Family Done Acceptance E VU  ES 18 1109     Done Acceptance E VU  ES 07/10/18 1536           "Point: Precautions (Done)    Learning Progress Summary     Learner Status Readiness Method Response Comment Documented by    Patient Done Acceptance E VU   07/11/18 1109     Done Acceptance E VU   07/10/18 1536     Done Acceptance E,TB EM RAMIREZ   07/10/18 0022    Family Done Acceptance E VU  ES 07/11/18 1109     Done Acceptance E VU   07/10/18 1536                      User Key     Initials Effective Dates Name Provider Type Discipline     04/03/18 -  Erika Adams, PT Physical Therapist PT     12/01/16 -  Tanna Arriaga RN Registered Nurse Nurse     06/11/18 -  Ilene Mays, \[facility]\"" Student PTA Student PT                Therapy Treatment        Rehabilitation Treatment Summary     Row Name 07/11/18 0800             Treatment Time/Intention    Discipline (P)  physical therapy assistant  -ES      Document Type (P)  therapy note (daily note);discharge treatment  -ES2      Subjective Information (P)  complains of;pain   soreness  -ES2      Care Plan Review (P)  patient/other agree to care plan  -ES3      Care Plan Review, Other Participant(s) (P)  family  -ES3      Comment (P)  Pt. \"feels like the nerve block is gone, but my leg is hurting more today\"  -ES3      Recorded by [ES] Ilene Mays PTA Student 07/11/18 0855  [ES2] Ilene Mays, JOSELO Student 07/11/18 0907  [ES3] Ilene Mays, JOSELO Student 07/11/18 1109      Row Name 07/11/18 0800             Cognitive Assessment/Intervention- PT/OT    Personal Safety Interventions (P)  fall prevention program maintained;gait belt;nonskid shoes/slippers when out of bed;supervised activity  -ES      Recorded by [ES] Ilene Mays PTA Student 07/11/18 1109      Row Name 07/11/18 0800             Sit-Stand Transfer    Sit-Stand [place] (Transfers) (P)  contact guard  -ES      Assistive Device (Sit-Stand Transfers) (P)  walker, front-wheeled  -ES      Recorded by [ES] Ilene Mays PTA Student 07/11/18 " 1109      Row Name 07/11/18 0800             Stand-Sit Transfer    Stand-Sit [place] (Transfers) (P)  contact guard  -ES      Assistive Device (Stand-Sit Transfers) (P)  walker, front-wheeled  -ES      Recorded by [ES] Ilene Mays PTA Student 07/11/18 1109      Row Name 07/11/18 0800             Gait/Stairs Assessment/Training    [place]orter Level (Gait) (P)  contact guard  -ES      Assistive Device (Gait) (P)  walker, front-wheeled  -ES      Distance in Feet (Gait) (P)  60 feet  -ES      Deviations/Abnormal Patterns (Gait) (P)  gait speed decreased  -ES      [place]orter Level (Stairs) (P)  verbal cues;contact guard  -ES      Handrail Location (Stairs) (P)  right side (ascending)  -ES      Number of Steps (Stairs) (P)  25 stair steps  -ES2      Ascending Technique (Stairs) (P)  step-to-step  -ES      Descending Technique (Stairs) (P)  step-to-step  -ES      Comment (Gait/Stairs) (P)  Pt completed gym stairs forward x2 and bckwrd w/ min assist for safety  -ES2      Recorded by [ES] Ilene Mays, JOSELO Student 07/11/18 1109  [ES2] Ilene Mays, [facility] Student 07/11/18 1112      Row Name 07/11/18 0800             Left Lower Ext    Lt Knee Extension/Flexion AROM (P)  7-80 degrees  -ES      Recorded by [ES] Ilene Mays PTA Student 07/11/18 1109      Row Name 07/11/18 0800             Therapeutic Exercise    Comment (Therapeutic Exercise) (P)  Left TKR Protocol x 20 reps  -ES      Recorded by [ES] Ilene Mays PTA Student 07/11/18 1109      Row Name 07/11/18 0800             Pain Scale: Numbers Pre/Post-Treatment    Pain Scale: Numbers, Pretreatment (P)  8/10  -ES      Pain Location - Side (P)  Left  -ES      Pain Location (P)  knee  -ES      Recorded by [ES] Ilene Mays PTA Student 07/11/18 1109      Row Name 07/11/18 0800             Pain Scale 2: Numbers Pre/Post-Treatment    Pain Intervention(s) 2 (P)  Repositioned;Ambulation/increased activity   -ES      Recorded by [ES] Ilene Mays PTA Student 07/11/18 1109      Row Name                Wound 07/09/18 1759 Left knee incision    Wound - Properties Group Date first assessed: 07/09/18 [KM] Time first assessed: 1759 [KM] Side: Left [KM] Location: knee [KM] Type: incision [KM] Recorded by:  [KM] Nikole Tavares RN 07/09/18 1759      User Key  (r) = Recorded By, (t) = Taken By, (c) = Cosigned By    Initials Name Effective Dates Discipline    KM Nikole Tavares RN 11/27/17 -  Nurse    ES Ilene Mays PTA Student 06/11/18 -  PT        Wound 07/09/18 1759 Left knee incision (Active)   Dressing Appearance dry;intact 7/11/2018  8:28 AM   Closure DORIE 7/11/2018  8:28 AM   Dressing Care, Wound dressing changed 7/10/2018  4:31 PM       PT Recommendation and Plan  Anticipated Discharge Disposition (PT): (P) home, home with assist, home with home health  Outcome Summary/Treatment Plan (PT)  Anticipated Discharge Disposition (PT): (P) home, home with assist, home with home health  Plan of Care Reviewed With: (P) patient, family  Progress: (P) improving  Outcome Summary: (P) Pt incr TKR protocol reps and incr amb dist and completed gym stairs forward x2 and backward w/  min assist for safety.  Swelling and soreness of Left LE is limiting AROM and mobility.          Outcome Measures     Row Name 07/11/18 1100 07/10/18 1100          How much help from another person do you currently need...    Turning from your back to your side while in flat bed without using bedrails? (P)  3  -ES 3  -EJ     Moving from lying on back to sitting on the side of a flat bed without bedrails? (P)  3  -ES 3  -EJ     Moving to and from a bed to a chair (including a wheelchair)? (P)  4  -ES 3  -EJ     Standing up from a chair using your arms (e.g., wheelchair, bedside chair)? (P)  4  -ES 3  -EJ     Climbing 3-5 steps with a railing? (P)  3  -ES 2  -EJ     To walk in hospital room? (P)  4  -ES 3  -EJ     AM-PAC 6 Clicks  Score (P)  21  -ES 17  -EJ        Functional Assessment    Outcome Measure Options (P)  AM-PAC 6 Clicks Basic Mobility (PT)  -ES AM-PAC 6 Clicks Basic Mobility (PT)  -EJ       User Key  (r) = Recorded By, (t) = Taken By, (c) = Cosigned By    Initials Name Provider Type    EJ Erika Adams, PT Physical Therapist    ES Ilene Mays, PTA Student PTA Student           Time Calculation:         PT Charges     Row Name 07/11/18 1113             Time Calculation    Start Time (P)  0844  -ES      Stop Time (P)  0945  -ES      Time Calculation (min) (P)  61 min  -ES      PT Goal Re-Cert Due Date (P)  07/11/18  -ES         Time Calculation- PT    Total Timed Code Minutes- PT (P)  23 minute(s)  -ES        User Key  (r) = Recorded By, (t) = Taken By, (c) = Cosigned By    Initials Name Provider Type    ES Ilene Mays, PTA Student PTA Student        Therapy Suggested Charges     Code   Minutes Charges    50235 (CPT®) Hc Pt Neuromusc Re Education Ea 15 Min      67208 (CPT®) Hc Pt Ther Proc Ea 15 Min 10 1    04755 (CPT®) Hc Gait Training Ea 15 Min      63452 (CPT®) Hc Pt Therapeutic Act Ea 15 Min      80367 (CPT®) Hc Pt Manual Therapy Ea 15 Min      31252 (CPT®) Hc Pt Iontophoresis Ea 15 Min      91432 (CPT®) Hc Pt Elec Stim Ea-Per 15 Min      77528 (CPT®) Hc Pt Ultrasound Ea 15 Min      65265 (CPT®) Hc Pt Self Care/Mgmt/Train Ea 15 Min      Total  10 1          Therapy Charges for Today     Code Description Service Date Service Provider Modifiers Qty    23932850144 HC PT THER PROC EA 15 MIN 7/10/2018 Ilene Mays, PTA Student GP 1    92615775952 HC PT THER PROC GROUP 7/10/2018 Ilene Mays, PTA Student GP 1    89872784697 HC PT THER PROC EA 15 MIN 7/11/2018 Ilene Mays, PTA Student GP 2    64800770756 HC PT THER PROC GROUP 7/11/2018 Ilene Mays, PTA Student GP 1          PT G-Codes  Outcome Measure Options: (P) AM-PAC 6 Clicks Basic Mobility (PT)    PT  Discharge Summary  Anticipated Discharge Disposition (PT): (P) home, home with assist, home with home health  Reason for Discharge: (P) Discharge from facility  Outcomes Achieved: (P) Patient able to partially acheive established goals    Ilene Mays PTA Student  7/11/2018

## 2018-07-11 NOTE — PLAN OF CARE
Problem: Patient Care Overview  Goal: Plan of Care Review  Outcome: Ongoing (interventions implemented as appropriate)   07/11/18 1452   Coping/Psychosocial   Plan of Care Reviewed With patient;family   Plan of Care Review   Progress improving   OTHER   Outcome Summary Pt incr TKR protocol reps and incr amb dist and completed gym stairs forward x2 and backward w/ min assist for safety. Swelling and soreness of Left LE is limiting AROM and mobility.

## 2018-07-11 NOTE — DISCHARGE PLACEMENT REQUEST
"Alon Rodriguez (53 y.o. Female)     Date of Birth Social Security Number Address Home Phone MRN    1965  [address] [place] [id number] [phone] [id number]    L[employer]-day Marital Status          Jew        Admission Date Admission Type Admitting Provider Attending Provider Department, Room/Bed    7/9/18 Elective Bhanu Baxter MD Stearns, Zack R, MD [address], P896/1    Discharge Date Discharge Disposition Discharge Destination                       Attending Provider:  Bhanu Baxter MD    Allergies:  Erythromycin, Morphine, Penicillins, Sulfa Antibiotics, Tetracyclines & Related    Isolation:  None   Infection:  None   Code Status:  CPR    Ht:  162.6 cm (64\")   Wt:  110 kg (242 lb 5 oz)    Admission Cmt:  None   Principal Problem:  None                Active Insurance as of 7/9/2018     Primary Coverage     Payor Plan Insurance Group Employer/Plan Group    [employer] PPO [id number]     Payor Plan Address Payor Plan Phone Number Effective From Effective To    [address] [phone] 6/1/2014     [facility] [id number]       Subscriber Name Subscriber Birth Date Member ID       ALON RODRIGUEZ 1965 [id number]                 Emergency Contacts      (Rel.) Home Phone Work Phone Mobile Phone    Angel Rodriguez (Spouse) [phone] -- [phone]              "

## 2018-07-11 NOTE — PROGRESS NOTES
Discharge Planning Assessment  [facility]     Patient Name: Sophie Ace  MRN: [id number]  Today's Date: 7/11/2018    Admit Date: 7/9/2018          Discharge Needs Assessment     Row Name 07/11/18 1133       Living Environment    Lives With spouse    Name(s) of Who Lives With Patient Angel Ace [phone]    Current Living Arrangements home/apartment/condo    Primary Care Provided by self    Provides Primary Care For no one    Family Caregiver if Needed spouse    Able to Return to Prior Arrangements yes       Resource/Environmental Concerns    Resource/Environmental Concerns none    Transportation Concerns car, none       Transition Planning    Patient/Family Anticipates Transition to home with family    Patient/Family Anticipated Services at Transition home health care    Transportation Anticipated family or friend will provide       Discharge Needs Assessment    Readmission Within the Last 30 Days no previous admission in last 30 days    Equipment Currently Used at Home bipap/cpap;walker, standard;cane, straight;shower chair;commode    Anticipated Changes Related to Illness none    Equipment Needed After Discharge none            Discharge Plan     Row Name 07/11/18 1709       Plan    Plan Plan is home with [facility] to follow    Patient/Family in Agreement with Plan yes    Plan Comments CCP role explained and face sheet verified; emergency contact is spouse Angel Ace [phone], he will provide transportation at d/c; current pharmacy is [facility] on [address]; DME includes a walker, cane, CPAP, shower chair and raised commode. Plan is home with [facility] to follow, information given to Lory with [facility]. CHARLOTTE Borjas        Destination     No service coordination in this encounter.      Durable Medical Equipment     No service coordination in this encounter.      Dialysis/Infusion     No service coordination in this encounter.      Home Medical Care     No service coordination in this encounter.      Social Care     No service  coordination in this encounter.                Demographic Summary     Row Name 07/11/18 1137       General Information    Admission Type observation    Arrived From home            Functional Status     Row Name 07/11/18 1139       Functional Status    Usual Activity Tolerance good    Current Activity Tolerance moderate       Functional Status, IADL    Medications independent    Meal Preparation independent    Housekeeping independent    Laundry independent    Shopping independent       Mental Status    General Appearance WDL WDL            Psychosocial    No documentation.           Abuse/Neglect    No documentation.           Legal    No documentation.           Substance Abuse    No documentation.           Patient Forms    No documentation.         Ashwini Ortiz RN

## 2018-07-11 NOTE — PLAN OF CARE
Problem: Patient Care Overview  Goal: Plan of Care Review  Outcome: Ongoing (interventions implemented as appropriate)   07/11/18 0506   Coping/Psychosocial   Plan of Care Reviewed With patient   Plan of Care Review   Progress improving   OTHER   Outcome Summary POD 1 left knee revision. VSS. Pain is controlled with PO pain meds. Plan to dc home today. Pt educated on using CPAP machine when sleeping related to history of sleep apnea.      Goal: Individualization and Mutuality  Outcome: Ongoing (interventions implemented as appropriate)    Goal: Discharge Needs Assessment  Outcome: Ongoing (interventions implemented as appropriate)    Goal: Interprofessional Rounds/Family Conf  Outcome: Ongoing (interventions implemented as appropriate)      Problem: Fall Risk (Adult)  Goal: Absence of Fall  Outcome: Ongoing (interventions implemented as appropriate)      Problem: Skin Injury Risk (Adult)  Goal: Skin Health and Integrity  Outcome: Ongoing (interventions implemented as appropriate)      Problem: Pain, Acute (Adult)  Goal: Acceptable Pain Control/Comfort Level  Outcome: Ongoing (interventions implemented as appropriate)      Problem: Pain, Chronic (Adult)  Goal: Acceptable Pain/Comfort Level and Functional Ability  Outcome: Ongoing (interventions implemented as appropriate)

## 2018-07-11 NOTE — DISCHARGE SUMMARY
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  female who underwent successful left tka revision (femur and poly to PS) on 7/9/2018.   She was started on Eliquis 2.5mg po twice daily post-operatively for DVT prophylaxis on POD#1.  On post-op day 2 the patients dressing was changed and the incision was clean, with no signs of infection and the calf was soft, with no signs of DVT.  The patient progressed well with physical therapy and the patients hemoglobin remained stable. On post-operative day 2 the patient was felt ready for discharge.      Total Knee Joint Replacement Discharge Instructions:    I. ACTIVITIES:  1. Exercises:  ? Complete exercise program as taught by the hospital physical therapist 2 times per day  ? Exercise program will be advanced by the physical therapist  ? During the day be up ambulating every 2 hours (while awake) for short distances  ? Complete the ankle pump exercises at least 10 times per hour (while awake)  ? Elevate legs most of the day the first week post operatively and thereafter elevate legs when in bed and for at least 30 minutes during the day. Caution must be taken to avoid pillow placement under the bend of the knee as this can led to flexion contractures of the knee.  ? Use cold packs 20-30 minutes approximately 5 times per day. This should be done before and after completing your exercises and at any time you are experiencing pain/ stiffness in your operative extremity.      2.  Activities of Daily Living:  ? No tub baths, hot tubs, or swimming pools for 4 weeks  ? May shower and let water run over the incision on post-operative day #5 if no drainage. Do not scrub or rub the incision. Simply let the water run over the incision and pat dry.    II. Restrictions  ? Do not cross legs or kneel  ? Your surgeon will discuss with you when you will be able to drive again.  ? Weight bearing is as tolerated  ? First week stay inside on even terrain. May go up and down stairs one stair at a time utilizing the hand rail  ? After one week, you may venture outside.    III. Precautions:  ? Everyone that comes near you should wash their hands  ? No elective dental, genital-urinary, or colon procedures or surgical procedures for 12 weeks after surgery unless absolutely necessary.  ?  If dental work or surgical procedure is deemed absolutely necessary, you will need to contact your surgeon as you will need to take antibiotics 1 hour prior to any dental work (including teeth cleanings).  ? Please discuss with your surgeon prophylactic antibiotics as the length of time this intervention will be necessary for you varies with each patient’s health history and situation.  ? Avoid sick people. If you must be around someone who is ill, they should wear a mask.  ? Avoid visits to the Emergency Room or Urgent Care unless you are having a life threatening event.   ? If ordered stockings are to be placed on in the morning and removed at night. Monitor the stockings to ensure that any swelling is not causing the stockings to become too tight. In this case, remove stockings immediately.    IV. INCISION CARE:  ? Wash your hands prior to dressing changes  ? Change the dressing as needed to keep incision clean and dry. Utilize dry gauze and paper tape. Avoid touching the side of the gauze that goes against the incision with your hands.  ? No creams or ointments to the incision  ? May remove dressing once the incision is free  of drainage  ? Do not touch or pick at the incision  ? Check incision every day and notify surgeon immediately if any of the following signs or symptoms are noted:  o Increase in redness  o Increase in swelling around the incision and of the entire extremity  o Increase in pain  o Drainage oozing from the incision  o Pulling apart of the edges of the incision  o Increase in overall body temperature (greater than 100.5 degrees)  ? Your surgeon will instruct you regarding suture or staple removal    V. Medications:   1. Anticoagulants: You will be discharged on an anticoagulant. This is a prophylactic medication that helps prevent blood clots during your post-operative period. The type and length of dosage varies based on your individual needs, procedure performed, and surgeon’s preference.  ? While taking the anticoagulant, you should avoid taking any additional aspirin, ibuprofen (Advil or Motrin), Aleve (Naprosyn) or other non-steroidal anti-inflammatory medications.   ? Notify surgeon immediately if any betty bleeding is noted in the urine, stool, emesis, or from the nose or the incision. Blood in the stool will often appear as black rather than red. Blood in urine may appear as pink. Blood in emesis may appear as brown/black like coffee grounds.  ? You will need to apply pressure for longer periods of time to any cuts or abrasions to stop bleeding  ? Avoid alcohol while taking anticoagulants    2. Stool Softeners: You will be at greater risk of constipation after surgery due to being less mobile and the pain medications.   ? Take stool softeners as instructed by your surgeon while on pain medications. Over the counter Colace 100 mg 1-2 capsules twice daily.   ? If stools become too loose or too frequent, please decreases the dosage or stop the stool softener.  ? If constipation occurs despite use of stool softeners, you are to continue the stool softeners and add a laxative (Milk of Magnesia 1 ounce daily as  needed)  ? Drink plenty of fluids, and eat fruits and vegetables during your recovery time    3. Pain Medications utilized after surgery are narcotics and the law requires that the following information be given to all patients that are prescribed narcotics:  ? CLASSIFICATION: Pain medications are called Opioids and are narcotics  ? LEGALITIES: It is illegal to share narcotics with others and to drive within 24 hours of taking narcotics  ? POTENTIAL SIDE EFFECTS: Potential side effects of opioids include: nausea, vomiting, itching, dizziness, drowsiness, dry mouth, constipation, and difficulty urinating.  ? POTENTIAL ADVERSE EFFECTS:   o Opioid tolerance can develop with use of pain medications and this simply means that it requires more and more of the medication to control pain; however, this is seen more in patients that use opioids for longer periods of time.  o Opioid dependence can develop with use of Opioids and this simply means that to stop the medication can cause withdrawal symptoms; however, this is seen with patients that use Opioids for longer periods of time.  o Opioid addiction can develop with use of Opioids and the incidence of this is very unlikely in patients who take the medications as ordered and stop the medications as instructed.  o Opioid overdose can be dangerous, but is unlikely when the medication is taken as ordered and stopped when ordered. It is important not to mix opioids with alcohol or with and type of sedative such as Benadryl as this can lead to over sedation and respiratory difficulty.  ? DOSAGE:   o Pain medications will need to be taken consistently for the first week to decrease pain and promote adequate pain relief and participation in physical therapy.  o After the initial surgical pain begins to resolve, you may begin to decrease the pain medication. By the end of 6-8 weeks, you should be off of pain medications.  o Refills will not be given by the office during evening  hours, on weekends, or after 6-8 weeks post-op.  o To seek refills on pain medications during the initial 6 week post-operative period, you must call the office 48 hours in advance to request the refill. The office will then notify you when to  the prescription. DO NOT wait until you are out of the medication to request a refill.    V. FOLLOW-UP VISITS:  ? You will need to follow up in the office with your surgeon in 3 weeks. Please call this number [phone] or [phone] to schedule this appointment.  If you have any concerns or suspected complications prior to your follow up visit, please call your surgeons office. Do not wait until your appointment time if you suspect complications. These will need to be addressed in the office promptly.      Discharge Medications:     1) Percocet 10/325 1-2 po q 4-6 hours for pain control  2)  Eliquis 2.5 mg po twice daily for 2 weeks, then once daily for 4 weeks.    CC:Kim Barr MD:Bhanu Baxter MD

## 2018-07-12 NOTE — PAYOR COMM NOTE
"Alon Rodriguez (53 y.o. Female)     Date of Birth Social Security Number Address Home Phone MRN    1965  [address] [place] [id number] [phone] [id number]    Buddhist Marital Status          Oriental orthodox        Admission Date Admission Type Admitting Provider Attending Provider Department, Room/Bed    7/9/18 Elective Bhanu Baxter MD  [address], P896/1    Discharge Date Discharge Disposition Discharge Destination        7/11/2018 Home or Self Care              Attending Provider:  (none)   Allergies:  Erythromycin, Morphine, Penicillins, Sulfa Antibiotics, Tetracyclines & Related    Isolation:  None   Infection:  None   Code Status:  CPR    Ht:  162.6 cm (64\")   Wt:  110 kg (242 lb 5 oz)    Admission Cmt:  None   Principal Problem:  None                Active Insurance as of 7/9/2018     Primary Coverage     Payor Plan Insurance Group Employer/Plan Group    ANTHEM BLUE CROSS ANTHEM BLUE CROSS BLUE SHIELD PPO [id number]     Payor Plan Address Payor Plan Phone Number Effective From Effective To    [address] [phone] 6/1/2014     [facility] [id number]       Subscriber Name Subscriber Birth Date Member ID       ALON RODRIGUEZ 1965 [id number]                 Emergency Contacts      (Rel.) Home Phone Work Phone Mobile Phone    Angel Rodriguez (Spouse) [phone] -- [phone]          "

## 2018-07-12 NOTE — DISCHARGE INSTR - ACTIVITY
Total Knee Joint Replacement Discharge Instructions:     I. ACTIVITIES:  1. Exercises:  · Complete exercise program as taught by the hospital physical therapist 2 times per day  · Exercise program will be advanced by the physical therapist  · During the day be up ambulating every 2 hours (while awake) for short distances  · Complete the ankle pump exercises at least 10 times per hour (while awake)  · Elevate legs most of the day the first week post operatively and thereafter elevate legs when in bed and for at least 30 minutes during the day. Caution must be taken to avoid pillow placement under the bend of the knee as this can led to flexion contractures of the knee.  · Use cold packs 20-30 minutes approximately 5 times per day. This should be done before and after completing your exercises and at any time you are experiencing pain/ stiffness in your operative extremity.        2. Activities of Daily Living:  · No tub baths, hot tubs, or swimming pools for 4 weeks  · May shower and let water run over the incision on post-operative day #5 if no drainage. Do not scrub or rub the incision. Simply let the water run over the incision and pat dry.     II. Restrictions  · Do not cross legs or kneel  · Your surgeon will discuss with you when you will be able to drive again.  · Weight bearing is as tolerated  · First week stay inside on even terrain. May go up and down stairs one stair at a time utilizing the hand rail  · After one week, you may venture outside.     III. Precautions:  · Everyone that comes near you should wash their hands  · No elective dental, genital-urinary, or colon procedures or surgical procedures for 12 weeks after surgery unless absolutely necessary.  ·  If dental work or surgical procedure is deemed absolutely necessary, you will need to contact your surgeon as you will need to take antibiotics 1 hour prior to any dental work (including teeth cleanings).  · Please discuss with your surgeon  prophylactic antibiotics as the length of time this intervention will be necessary for you varies with each patient’s health history and situation.  · Avoid sick people. If you must be around someone who is ill, they should wear a mask.  · Avoid visits to the Emergency Room or Urgent Care unless you are having a life threatening event.   · If ordered stockings are to be placed on in the morning and removed at night. Monitor the stockings to ensure that any swelling is not causing the stockings to become too tight. In this case, remove stockings immediately.     IV. INCISION CARE:  · Wash your hands prior to dressing changes  · Change the dressing as needed to keep incision clean and dry. Utilize dry gauze and paper tape. Avoid touching the side of the gauze that goes against the incision with your hands.  · No creams or ointments to the incision  · May remove dressing once the incision is free of drainage  · Do not touch or pick at the incision  · Check incision every day and notify surgeon immediately if any of the following signs or symptoms are noted:  ? Increase in redness  ? Increase in swelling around the incision and of the entire extremity  ? Increase in pain  ? Drainage oozing from the incision  ? Pulling apart of the edges of the incision  ? Increase in overall body temperature (greater than 100.5 degrees)  · Your surgeon will instruct you regarding suture or staple removal     V. Medications:   1. Anticoagulants: You will be discharged on an anticoagulant. This is a prophylactic medication that helps prevent blood clots during your post-operative period. The type and length of dosage varies based on your individual needs, procedure performed, and surgeon’s preference.  · While taking the anticoagulant, you should avoid taking any additional aspirin, ibuprofen (Advil or Motrin), Aleve (Naprosyn) or other non-steroidal anti-inflammatory medications.   · Notify surgeon immediately if any betty bleeding is  noted in the urine, stool, emesis, or from the nose or the incision. Blood in the stool will often appear as black rather than red. Blood in urine may appear as pink. Blood in emesis may appear as brown/black like coffee grounds.  · You will need to apply pressure for longer periods of time to any cuts or abrasions to stop bleeding  · Avoid alcohol while taking anticoagulants     2. Stool Softeners: You will be at greater risk of constipation after surgery due to being less mobile and the pain medications.   · Take stool softeners as instructed by your surgeon while on pain medications. Over the counter Colace 100 mg 1-2 capsules twice daily.   · If stools become too loose or too frequent, please decreases the dosage or stop the stool softener.  · If constipation occurs despite use of stool softeners, you are to continue the stool softeners and add a laxative (Milk of Magnesia 1 ounce daily as needed)  · Drink plenty of fluids, and eat fruits and vegetables during your recovery time     3. Pain Medications utilized after surgery are narcotics and the law requires that the following information be given to all patients that are prescribed narcotics:  · CLASSIFICATION: Pain medications are called Opioids and are narcotics  · LEGALITIES: It is illegal to share narcotics with others and to drive within 24 hours of taking narcotics  · POTENTIAL SIDE EFFECTS: Potential side effects of opioids include: nausea, vomiting, itching, dizziness, drowsiness, dry mouth, constipation, and difficulty urinating.  · POTENTIAL ADVERSE EFFECTS:   ? Opioid tolerance can develop with use of pain medications and this simply means that it requires more and more of the medication to control pain; however, this is seen more in patients that use opioids for longer periods of time.  ? Opioid dependence can develop with use of Opioids and this simply means that to stop the medication can cause withdrawal symptoms; however, this is seen with  patients that use Opioids for longer periods of time.  ? Opioid addiction can develop with use of Opioids and the incidence of this is very unlikely in patients who take the medications as ordered and stop the medications as instructed.  ? Opioid overdose can be dangerous, but is unlikely when the medication is taken as ordered and stopped when ordered. It is important not to mix opioids with alcohol or with and type of sedative such as Benadryl as this can lead to over sedation and respiratory difficulty.  · DOSAGE:   ? Pain medications will need to be taken consistently for the first week to decrease pain and promote adequate pain relief and participation in physical therapy.  ? After the initial surgical pain begins to resolve, you may begin to decrease the pain medication. By the end of 6-8 weeks, you should be off of pain medications.  ? Refills will not be given by the office during evening hours, on weekends, or after 6-8 weeks post-op.  ? To seek refills on pain medications during the initial 6 week post-operative period, you must call the office 48 hours in advance to request the refill. The office will then notify you when to  the prescription. DO NOT wait until you are out of the medication to request a refill.     V. FOLLOW-UP VISITS:  · You will need to follow up in the office with your surgeon in 3 weeks. Please call this number [phone] or [phone] to schedule this appointment.  If you have any concerns or suspected complications prior to your follow up visit, please call your surgeons office. Do not wait until your appointment time if you suspect complications. These will need to be addressed in the office promptly.

## 2019-01-31 ENCOUNTER — OFFICE (OUTPATIENT)
Dept: URBAN - METROPOLITAN AREA CLINIC 75 | Facility: CLINIC | Age: 54
End: 2019-01-31

## 2019-01-31 VITALS
DIASTOLIC BLOOD PRESSURE: 84 MMHG | SYSTOLIC BLOOD PRESSURE: 130 MMHG | WEIGHT: 258 LBS | RESPIRATION RATE: 18 BRPM | HEIGHT: 64 IN

## 2019-01-31 DIAGNOSIS — K62.89 OTHER SPECIFIED DISEASES OF ANUS AND RECTUM: ICD-10-CM

## 2019-01-31 DIAGNOSIS — K62.5 HEMORRHAGE OF ANUS AND RECTUM: ICD-10-CM

## 2019-01-31 DIAGNOSIS — Z83.71 FAMILY HISTORY OF COLONIC POLYPS: ICD-10-CM

## 2019-01-31 DIAGNOSIS — K59.00 CONSTIPATION, UNSPECIFIED: ICD-10-CM

## 2019-01-31 DIAGNOSIS — K21.9 GASTRO-ESOPHAGEAL REFLUX DISEASE WITHOUT ESOPHAGITIS: ICD-10-CM

## 2019-01-31 PROCEDURE — 99202 OFFICE O/P NEW SF 15 MIN: CPT | Performed by: NURSE PRACTITIONER

## 2019-03-13 VITALS
RESPIRATION RATE: 21 BRPM | RESPIRATION RATE: 15 BRPM | SYSTOLIC BLOOD PRESSURE: 130 MMHG | SYSTOLIC BLOOD PRESSURE: 132 MMHG | RESPIRATION RATE: 22 BRPM | DIASTOLIC BLOOD PRESSURE: 44 MMHG | HEART RATE: 75 BPM | HEART RATE: 70 BPM | DIASTOLIC BLOOD PRESSURE: 106 MMHG | HEART RATE: 66 BPM | RESPIRATION RATE: 24 BRPM | DIASTOLIC BLOOD PRESSURE: 50 MMHG | SYSTOLIC BLOOD PRESSURE: 114 MMHG | HEART RATE: 73 BPM | HEART RATE: 68 BPM | OXYGEN SATURATION: 100 % | OXYGEN SATURATION: 97 % | WEIGHT: 258 LBS | TEMPERATURE: 97.1 F | SYSTOLIC BLOOD PRESSURE: 121 MMHG | SYSTOLIC BLOOD PRESSURE: 142 MMHG | DIASTOLIC BLOOD PRESSURE: 60 MMHG | DIASTOLIC BLOOD PRESSURE: 71 MMHG | HEIGHT: 64 IN | HEART RATE: 69 BPM | DIASTOLIC BLOOD PRESSURE: 76 MMHG | HEART RATE: 72 BPM | DIASTOLIC BLOOD PRESSURE: 69 MMHG | SYSTOLIC BLOOD PRESSURE: 135 MMHG | HEART RATE: 97 BPM | OXYGEN SATURATION: 99 % | RESPIRATION RATE: 20 BRPM | OXYGEN SATURATION: 96 % | RESPIRATION RATE: 19 BRPM | SYSTOLIC BLOOD PRESSURE: 138 MMHG | SYSTOLIC BLOOD PRESSURE: 128 MMHG | SYSTOLIC BLOOD PRESSURE: 123 MMHG | DIASTOLIC BLOOD PRESSURE: 74 MMHG | HEART RATE: 65 BPM | HEART RATE: 54 BPM | SYSTOLIC BLOOD PRESSURE: 124 MMHG | DIASTOLIC BLOOD PRESSURE: 83 MMHG | DIASTOLIC BLOOD PRESSURE: 80 MMHG | RESPIRATION RATE: 32 BRPM | TEMPERATURE: 97.7 F | RESPIRATION RATE: 23 BRPM | RESPIRATION RATE: 8 BRPM | RESPIRATION RATE: 14 BRPM | DIASTOLIC BLOOD PRESSURE: 91 MMHG

## 2019-03-15 ENCOUNTER — AMBULATORY SURGICAL CENTER (OUTPATIENT)
Dept: URBAN - METROPOLITAN AREA SURGERY 17 | Facility: SURGERY | Age: 54
End: 2019-03-15
Payer: COMMERCIAL

## 2019-03-15 ENCOUNTER — OFFICE (OUTPATIENT)
Dept: URBAN - METROPOLITAN AREA PATHOLOGY 4 | Facility: PATHOLOGY | Age: 54
End: 2019-03-15

## 2019-03-15 DIAGNOSIS — K29.70 GASTRITIS, UNSPECIFIED, WITHOUT BLEEDING: ICD-10-CM

## 2019-03-15 DIAGNOSIS — Z83.71 FAMILY HISTORY OF COLONIC POLYPS: ICD-10-CM

## 2019-03-15 DIAGNOSIS — K59.00 CONSTIPATION, UNSPECIFIED: ICD-10-CM

## 2019-03-15 DIAGNOSIS — K21.9 GASTRO-ESOPHAGEAL REFLUX DISEASE WITHOUT ESOPHAGITIS: ICD-10-CM

## 2019-03-15 DIAGNOSIS — K62.5 HEMORRHAGE OF ANUS AND RECTUM: ICD-10-CM

## 2019-03-15 DIAGNOSIS — K62.89 OTHER SPECIFIED DISEASES OF ANUS AND RECTUM: ICD-10-CM

## 2019-03-15 DIAGNOSIS — K57.30 DIVERTICULOSIS OF LARGE INTESTINE WITHOUT PERFORATION OR ABS: ICD-10-CM

## 2019-03-15 PROBLEM — Z12.11 SCREENING FOR COLONIC NEOPLASIA: Status: ACTIVE | Noted: 2019-03-15

## 2019-03-15 LAB
GI HISTOLOGY: A. SELECT: (no result)
GI HISTOLOGY: PDF REPORT: (no result)

## 2019-03-15 PROCEDURE — 45378 DIAGNOSTIC COLONOSCOPY: CPT | Performed by: INTERNAL MEDICINE

## 2019-03-15 PROCEDURE — 88305 TISSUE EXAM BY PATHOLOGIST: CPT | Performed by: INTERNAL MEDICINE

## 2019-03-15 PROCEDURE — 43239 EGD BIOPSY SINGLE/MULTIPLE: CPT | Performed by: INTERNAL MEDICINE

## 2020-10-14 ENCOUNTER — APPOINTMENT (OUTPATIENT)
Dept: PREADMISSION TESTING | Facility: HOSPITAL | Age: 55
End: 2020-10-14

## 2020-10-14 VITALS
OXYGEN SATURATION: 94 % | WEIGHT: 261.7 LBS | SYSTOLIC BLOOD PRESSURE: 131 MMHG | DIASTOLIC BLOOD PRESSURE: 83 MMHG | HEIGHT: 64 IN | BODY MASS INDEX: 44.68 KG/M2 | RESPIRATION RATE: 16 BRPM | TEMPERATURE: 99 F

## 2020-10-14 LAB
ALBUMIN SERPL-MCNC: 4 G/DL (ref 3.5–5.2)
ALBUMIN/GLOB SERPL: 1.4 G/DL
ALP SERPL-CCNC: 94 U/L (ref 39–117)
ALT SERPL W P-5'-P-CCNC: 18 U/L (ref 1–33)
ANION GAP SERPL CALCULATED.3IONS-SCNC: 8.1 MMOL/L (ref 5–15)
AST SERPL-CCNC: 26 U/L (ref 1–32)
BASOPHILS # BLD AUTO: 0.08 10*3/MM3 (ref 0–0.2)
BASOPHILS NFR BLD AUTO: 2 % (ref 0–1.5)
BILIRUB SERPL-MCNC: 0.4 MG/DL (ref 0–1.2)
BUN SERPL-MCNC: 17 MG/DL (ref 6–20)
BUN/CREAT SERPL: 29.8 (ref 7–25)
CALCIUM SPEC-SCNC: 9.1 MG/DL (ref 8.6–10.5)
CHLORIDE SERPL-SCNC: 105 MMOL/L (ref 98–107)
CO2 SERPL-SCNC: 27.9 MMOL/L (ref 22–29)
CREAT SERPL-MCNC: 0.57 MG/DL (ref 0.57–1)
DEPRECATED RDW RBC AUTO: 41.2 FL (ref 37–54)
EOSINOPHIL # BLD AUTO: 0.41 10*3/MM3 (ref 0–0.4)
EOSINOPHIL NFR BLD AUTO: 10.1 % (ref 0.3–6.2)
ERYTHROCYTE [DISTWIDTH] IN BLOOD BY AUTOMATED COUNT: 13 % (ref 12.3–15.4)
GFR SERPL CREATININE-BSD FRML MDRD: 110 ML/MIN/1.73
GLOBULIN UR ELPH-MCNC: 2.9 GM/DL
GLUCOSE SERPL-MCNC: 97 MG/DL (ref 65–99)
HCT VFR BLD AUTO: 36 % (ref 34–46.6)
HGB BLD-MCNC: 11.9 G/DL (ref 12–15.9)
IMM GRANULOCYTES # BLD AUTO: 0.01 10*3/MM3 (ref 0–0.05)
IMM GRANULOCYTES NFR BLD AUTO: 0.2 % (ref 0–0.5)
LYMPHOCYTES # BLD AUTO: 1.3 10*3/MM3 (ref 0.7–3.1)
LYMPHOCYTES NFR BLD AUTO: 32.2 % (ref 19.6–45.3)
MCH RBC QN AUTO: 28.9 PG (ref 26.6–33)
MCHC RBC AUTO-ENTMCNC: 33.1 G/DL (ref 31.5–35.7)
MCV RBC AUTO: 87.4 FL (ref 79–97)
MONOCYTES # BLD AUTO: 0.34 10*3/MM3 (ref 0.1–0.9)
MONOCYTES NFR BLD AUTO: 8.4 % (ref 5–12)
NEUTROPHILS NFR BLD AUTO: 1.9 10*3/MM3 (ref 1.7–7)
NEUTROPHILS NFR BLD AUTO: 47.1 % (ref 42.7–76)
NRBC BLD AUTO-RTO: 0 /100 WBC (ref 0–0.2)
PLATELET # BLD AUTO: 187 10*3/MM3 (ref 140–450)
PMV BLD AUTO: 10.8 FL (ref 6–12)
POTASSIUM SERPL-SCNC: 4.1 MMOL/L (ref 3.5–5.2)
PROT SERPL-MCNC: 6.9 G/DL (ref 6–8.5)
RBC # BLD AUTO: 4.12 10*6/MM3 (ref 3.77–5.28)
SODIUM SERPL-SCNC: 141 MMOL/L (ref 136–145)
WBC # BLD AUTO: 4.04 10*3/MM3 (ref 3.4–10.8)

## 2020-10-14 PROCEDURE — 36415 COLL VENOUS BLD VENIPUNCTURE: CPT

## 2020-10-14 PROCEDURE — 93005 ELECTROCARDIOGRAM TRACING: CPT

## 2020-10-14 PROCEDURE — 80053 COMPREHEN METABOLIC PANEL: CPT | Performed by: ORTHOPAEDIC SURGERY

## 2020-10-14 PROCEDURE — 93010 ELECTROCARDIOGRAM REPORT: CPT | Performed by: INTERNAL MEDICINE

## 2020-10-14 PROCEDURE — C9803 HOPD COVID-19 SPEC COLLECT: HCPCS | Performed by: NURSE PRACTITIONER

## 2020-10-14 PROCEDURE — 85025 COMPLETE CBC W/AUTO DIFF WBC: CPT | Performed by: ORTHOPAEDIC SURGERY

## 2020-10-14 PROCEDURE — U0004 COV-19 TEST NON-CDC HGH THRU: HCPCS | Performed by: NURSE PRACTITIONER

## 2020-10-14 RX ORDER — LUBIPROSTONE 8 UG/1
8 CAPSULE ORAL 2 TIMES DAILY
COMMUNITY

## 2020-10-14 RX ORDER — HYDROCODONE BITARTRATE AND ACETAMINOPHEN 10; 325 MG/1; MG/1
1 TABLET ORAL 3 TIMES DAILY PRN
COMMUNITY
End: 2020-10-18 | Stop reason: HOSPADM

## 2020-10-15 ENCOUNTER — PREP FOR SURGERY (OUTPATIENT)
Dept: OTHER | Facility: HOSPITAL | Age: 55
End: 2020-10-15

## 2020-10-15 DIAGNOSIS — Z98.890 S/P FOOT SURGERY: Primary | ICD-10-CM

## 2020-10-15 LAB — SARS-COV-2 RNA RESP QL NAA+PROBE: NOT DETECTED

## 2020-10-15 RX ORDER — CLINDAMYCIN PHOSPHATE 900 MG/50ML
900 INJECTION INTRAVENOUS ONCE
Status: CANCELLED | OUTPATIENT
Start: 2020-10-16 | End: 2020-10-15

## 2020-10-16 ENCOUNTER — ANESTHESIA (OUTPATIENT)
Dept: PERIOP | Facility: HOSPITAL | Age: 55
End: 2020-10-16

## 2020-10-16 ENCOUNTER — HOSPITAL ENCOUNTER (OUTPATIENT)
Facility: HOSPITAL | Age: 55
Discharge: HOME | End: 2020-10-18
Attending: ORTHOPAEDIC SURGERY | Admitting: ORTHOPAEDIC SURGERY

## 2020-10-16 ENCOUNTER — ANESTHESIA EVENT (OUTPATIENT)
Dept: PERIOP | Facility: HOSPITAL | Age: 55
End: 2020-10-16

## 2020-10-16 ENCOUNTER — APPOINTMENT (OUTPATIENT)
Dept: GENERAL RADIOLOGY | Facility: HOSPITAL | Age: 55
End: 2020-10-16

## 2020-10-16 DIAGNOSIS — M19.071 OSTEOARTHRITIS OF RIGHT HINDFOOT: Primary | ICD-10-CM

## 2020-10-16 DIAGNOSIS — Z98.890 S/P FOOT SURGERY: ICD-10-CM

## 2020-10-16 PROCEDURE — 25010000002 FENTANYL CITRATE (PF) 100 MCG/2ML SOLUTION: Performed by: NURSE ANESTHETIST, CERTIFIED REGISTERED

## 2020-10-16 PROCEDURE — 76000 FLUOROSCOPY <1 HR PHYS/QHP: CPT

## 2020-10-16 PROCEDURE — 25010000002 NEOSTIGMINE PER 0.5 MG: Performed by: NURSE ANESTHETIST, CERTIFIED REGISTERED

## 2020-10-16 PROCEDURE — 73610 X-RAY EXAM OF ANKLE: CPT

## 2020-10-16 PROCEDURE — C1713 ANCHOR/SCREW BN/BN,TIS/BN: HCPCS | Performed by: ORTHOPAEDIC SURGERY

## 2020-10-16 PROCEDURE — G0378 HOSPITAL OBSERVATION PER HR: HCPCS

## 2020-10-16 PROCEDURE — 25010000002 MIDAZOLAM PER 1 MG: Performed by: ANESTHESIOLOGY

## 2020-10-16 PROCEDURE — C9290 INJ, BUPIVACAINE LIPOSOME: HCPCS | Performed by: ANESTHESIOLOGY

## 2020-10-16 PROCEDURE — 25010000002 ONDANSETRON PER 1 MG: Performed by: NURSE ANESTHETIST, CERTIFIED REGISTERED

## 2020-10-16 PROCEDURE — 25010000002 DEXAMETHASONE PER 1 MG: Performed by: NURSE ANESTHETIST, CERTIFIED REGISTERED

## 2020-10-16 PROCEDURE — C1769 GUIDE WIRE: HCPCS | Performed by: ORTHOPAEDIC SURGERY

## 2020-10-16 PROCEDURE — 25010000002 PROPOFOL 10 MG/ML EMULSION: Performed by: NURSE ANESTHETIST, CERTIFIED REGISTERED

## 2020-10-16 PROCEDURE — 25010000003 BUPIVACAINE LIPOSOME 1.3 % SUSPENSION: Performed by: ANESTHESIOLOGY

## 2020-10-16 PROCEDURE — 25010000002 ONDANSETRON PER 1 MG: Performed by: NURSE PRACTITIONER

## 2020-10-16 PROCEDURE — 25010000002 FENTANYL CITRATE (PF) 100 MCG/2ML SOLUTION: Performed by: ANESTHESIOLOGY

## 2020-10-16 PROCEDURE — 25010000002 VANCOMYCIN 1 G RECONSTITUTED SOLUTION: Performed by: ORTHOPAEDIC SURGERY

## 2020-10-16 DEVICE — STPL BONE DYNACLIP NITNL 20X22X22MM: Type: IMPLANTABLE DEVICE | Status: FUNCTIONAL

## 2020-10-16 DEVICE — STPL BONE DYNACLIP PRELD NITNL 20X18X18MM STRL: Type: IMPLANTABLE DEVICE | Status: FUNCTIONAL

## 2020-10-16 DEVICE — ALLOGRFT FIBR OSTEOAMP SELECT 2.5CC: Type: IMPLANTABLE DEVICE | Status: FUNCTIONAL

## 2020-10-16 DEVICE — SCREW, HEADLESS, 4.5 X 44MM
Type: IMPLANTABLE DEVICE | Status: FUNCTIONAL
Brand: MEDLINE UNITE

## 2020-10-16 DEVICE — SCREW, HEADLESS, 6.5 X 75 X 16MM
Type: IMPLANTABLE DEVICE | Status: FUNCTIONAL
Brand: MEDLINE UNITE

## 2020-10-16 DEVICE — INJ BONE AUG 3CC: Type: IMPLANTABLE DEVICE | Status: FUNCTIONAL

## 2020-10-16 RX ORDER — SODIUM CHLORIDE 0.9 % (FLUSH) 0.9 %
3 SYRINGE (ML) INJECTION EVERY 12 HOURS SCHEDULED
Status: DISCONTINUED | OUTPATIENT
Start: 2020-10-16 | End: 2020-10-16 | Stop reason: HOSPADM

## 2020-10-16 RX ORDER — OXYCODONE AND ACETAMINOPHEN 10; 325 MG/1; MG/1
2 TABLET ORAL EVERY 4 HOURS PRN
Status: DISCONTINUED | OUTPATIENT
Start: 2020-10-16 | End: 2020-10-18 | Stop reason: HOSPADM

## 2020-10-16 RX ORDER — ACETAMINOPHEN 325 MG/1
325 TABLET ORAL EVERY 4 HOURS PRN
Status: DISCONTINUED | OUTPATIENT
Start: 2020-10-16 | End: 2020-10-18 | Stop reason: HOSPADM

## 2020-10-16 RX ORDER — DEXAMETHASONE SODIUM PHOSPHATE 10 MG/ML
INJECTION INTRAMUSCULAR; INTRAVENOUS AS NEEDED
Status: DISCONTINUED | OUTPATIENT
Start: 2020-10-16 | End: 2020-10-16 | Stop reason: SURG

## 2020-10-16 RX ORDER — ONDANSETRON 2 MG/ML
4 INJECTION INTRAMUSCULAR; INTRAVENOUS ONCE AS NEEDED
Status: DISCONTINUED | OUTPATIENT
Start: 2020-10-16 | End: 2020-10-16 | Stop reason: HOSPADM

## 2020-10-16 RX ORDER — CELECOXIB 200 MG/1
200 CAPSULE ORAL DAILY
COMMUNITY
End: 2020-10-18 | Stop reason: HOSPADM

## 2020-10-16 RX ORDER — SODIUM CHLORIDE, SODIUM LACTATE, POTASSIUM CHLORIDE, CALCIUM CHLORIDE 600; 310; 30; 20 MG/100ML; MG/100ML; MG/100ML; MG/100ML
9 INJECTION, SOLUTION INTRAVENOUS CONTINUOUS
Status: DISCONTINUED | OUTPATIENT
Start: 2020-10-16 | End: 2020-10-16

## 2020-10-16 RX ORDER — PROPOFOL 10 MG/ML
VIAL (ML) INTRAVENOUS AS NEEDED
Status: DISCONTINUED | OUTPATIENT
Start: 2020-10-16 | End: 2020-10-16 | Stop reason: SURG

## 2020-10-16 RX ORDER — BUPIVACAINE HYDROCHLORIDE 5 MG/ML
INJECTION, SOLUTION EPIDURAL; INTRACAUDAL
Status: COMPLETED | OUTPATIENT
Start: 2020-10-16 | End: 2020-10-16

## 2020-10-16 RX ORDER — NALOXONE HCL 0.4 MG/ML
0.4 VIAL (ML) INJECTION
Status: DISCONTINUED | OUTPATIENT
Start: 2020-10-16 | End: 2020-10-18 | Stop reason: HOSPADM

## 2020-10-16 RX ORDER — VANCOMYCIN HYDROCHLORIDE 1 G/20ML
INJECTION, POWDER, LYOPHILIZED, FOR SOLUTION INTRAVENOUS AS NEEDED
Status: DISCONTINUED | OUTPATIENT
Start: 2020-10-16 | End: 2020-10-16 | Stop reason: HOSPADM

## 2020-10-16 RX ORDER — FENTANYL CITRATE 50 UG/ML
INJECTION, SOLUTION INTRAMUSCULAR; INTRAVENOUS AS NEEDED
Status: DISCONTINUED | OUTPATIENT
Start: 2020-10-16 | End: 2020-10-16 | Stop reason: SURG

## 2020-10-16 RX ORDER — MAGNESIUM HYDROXIDE 1200 MG/15ML
LIQUID ORAL AS NEEDED
Status: DISCONTINUED | OUTPATIENT
Start: 2020-10-16 | End: 2020-10-16 | Stop reason: HOSPADM

## 2020-10-16 RX ORDER — FENTANYL CITRATE 50 UG/ML
50 INJECTION, SOLUTION INTRAMUSCULAR; INTRAVENOUS
Status: DISCONTINUED | OUTPATIENT
Start: 2020-10-16 | End: 2020-10-16 | Stop reason: HOSPADM

## 2020-10-16 RX ORDER — PANTOPRAZOLE SODIUM 40 MG/1
40 TABLET, DELAYED RELEASE ORAL EVERY MORNING
Status: DISCONTINUED | OUTPATIENT
Start: 2020-10-17 | End: 2020-10-18 | Stop reason: HOSPADM

## 2020-10-16 RX ORDER — HYDROMORPHONE HYDROCHLORIDE 1 MG/ML
0.5 INJECTION, SOLUTION INTRAMUSCULAR; INTRAVENOUS; SUBCUTANEOUS
Status: DISCONTINUED | OUTPATIENT
Start: 2020-10-16 | End: 2020-10-18 | Stop reason: HOSPADM

## 2020-10-16 RX ORDER — ROCURONIUM BROMIDE 10 MG/ML
INJECTION, SOLUTION INTRAVENOUS AS NEEDED
Status: DISCONTINUED | OUTPATIENT
Start: 2020-10-16 | End: 2020-10-16 | Stop reason: SURG

## 2020-10-16 RX ORDER — MIDAZOLAM HYDROCHLORIDE 1 MG/ML
2 INJECTION INTRAMUSCULAR; INTRAVENOUS
Status: DISCONTINUED | OUTPATIENT
Start: 2020-10-16 | End: 2020-10-16 | Stop reason: HOSPADM

## 2020-10-16 RX ORDER — EPHEDRINE SULFATE 50 MG/ML
5 INJECTION, SOLUTION INTRAVENOUS ONCE AS NEEDED
Status: DISCONTINUED | OUTPATIENT
Start: 2020-10-16 | End: 2020-10-16 | Stop reason: HOSPADM

## 2020-10-16 RX ORDER — HYDROMORPHONE HYDROCHLORIDE 1 MG/ML
0.5 INJECTION, SOLUTION INTRAMUSCULAR; INTRAVENOUS; SUBCUTANEOUS
Status: DISCONTINUED | OUTPATIENT
Start: 2020-10-16 | End: 2020-10-16 | Stop reason: HOSPADM

## 2020-10-16 RX ORDER — OXYCODONE AND ACETAMINOPHEN 7.5; 325 MG/1; MG/1
1 TABLET ORAL ONCE AS NEEDED
Status: DISCONTINUED | OUTPATIENT
Start: 2020-10-16 | End: 2020-10-16 | Stop reason: HOSPADM

## 2020-10-16 RX ORDER — OXYCODONE AND ACETAMINOPHEN 7.5; 325 MG/1; MG/1
1 TABLET ORAL EVERY 4 HOURS PRN
Status: CANCELLED | OUTPATIENT
Start: 2020-10-16 | End: 2020-10-23

## 2020-10-16 RX ORDER — FENTANYL CITRATE 50 UG/ML
100 INJECTION, SOLUTION INTRAMUSCULAR; INTRAVENOUS
Status: DISCONTINUED | OUTPATIENT
Start: 2020-10-16 | End: 2020-10-16 | Stop reason: HOSPADM

## 2020-10-16 RX ORDER — CLINDAMYCIN PHOSPHATE 900 MG/50ML
900 INJECTION INTRAVENOUS ONCE
Status: COMPLETED | OUTPATIENT
Start: 2020-10-16 | End: 2020-10-16

## 2020-10-16 RX ORDER — OXYCODONE AND ACETAMINOPHEN 10; 325 MG/1; MG/1
1 TABLET ORAL EVERY 4 HOURS PRN
Status: DISCONTINUED | OUTPATIENT
Start: 2020-10-16 | End: 2020-10-18 | Stop reason: HOSPADM

## 2020-10-16 RX ORDER — ONDANSETRON 2 MG/ML
4 INJECTION INTRAMUSCULAR; INTRAVENOUS EVERY 6 HOURS PRN
Status: DISCONTINUED | OUTPATIENT
Start: 2020-10-16 | End: 2020-10-18 | Stop reason: HOSPADM

## 2020-10-16 RX ORDER — HYDROCODONE BITARTRATE AND ACETAMINOPHEN 7.5; 325 MG/1; MG/1
1 TABLET ORAL ONCE AS NEEDED
Status: DISCONTINUED | OUTPATIENT
Start: 2020-10-16 | End: 2020-10-16 | Stop reason: HOSPADM

## 2020-10-16 RX ORDER — FLUOXETINE HYDROCHLORIDE 20 MG/1
20 CAPSULE ORAL EVERY MORNING
Status: DISCONTINUED | OUTPATIENT
Start: 2020-10-17 | End: 2020-10-18 | Stop reason: HOSPADM

## 2020-10-16 RX ORDER — OXYCODONE AND ACETAMINOPHEN 7.5; 325 MG/1; MG/1
2 TABLET ORAL EVERY 4 HOURS PRN
Status: CANCELLED | OUTPATIENT
Start: 2020-10-16 | End: 2020-10-23

## 2020-10-16 RX ORDER — ONDANSETRON 4 MG/1
4 TABLET, FILM COATED ORAL EVERY 6 HOURS PRN
Status: DISCONTINUED | OUTPATIENT
Start: 2020-10-16 | End: 2020-10-18 | Stop reason: HOSPADM

## 2020-10-16 RX ORDER — KETAMINE HYDROCHLORIDE 10 MG/ML
INJECTION INTRAMUSCULAR; INTRAVENOUS AS NEEDED
Status: DISCONTINUED | OUTPATIENT
Start: 2020-10-16 | End: 2020-10-16 | Stop reason: SURG

## 2020-10-16 RX ORDER — FLUMAZENIL 0.1 MG/ML
0.2 INJECTION INTRAVENOUS AS NEEDED
Status: DISCONTINUED | OUTPATIENT
Start: 2020-10-16 | End: 2020-10-16 | Stop reason: HOSPADM

## 2020-10-16 RX ORDER — SODIUM CHLORIDE 0.9 % (FLUSH) 0.9 %
3-10 SYRINGE (ML) INJECTION AS NEEDED
Status: DISCONTINUED | OUTPATIENT
Start: 2020-10-16 | End: 2020-10-16 | Stop reason: HOSPADM

## 2020-10-16 RX ORDER — FLUTICASONE PROPIONATE 50 MCG
2 SPRAY, SUSPENSION (ML) NASAL EVERY MORNING
Status: DISCONTINUED | OUTPATIENT
Start: 2020-10-17 | End: 2020-10-18 | Stop reason: HOSPADM

## 2020-10-16 RX ORDER — GLYCOPYRROLATE 0.2 MG/ML
INJECTION INTRAMUSCULAR; INTRAVENOUS AS NEEDED
Status: DISCONTINUED | OUTPATIENT
Start: 2020-10-16 | End: 2020-10-16 | Stop reason: SURG

## 2020-10-16 RX ORDER — DULOXETIN HYDROCHLORIDE 60 MG/1
120 CAPSULE, DELAYED RELEASE ORAL EVERY MORNING
Status: DISCONTINUED | OUTPATIENT
Start: 2020-10-17 | End: 2020-10-18 | Stop reason: HOSPADM

## 2020-10-16 RX ORDER — LIDOCAINE HYDROCHLORIDE 10 MG/ML
0.5 INJECTION, SOLUTION EPIDURAL; INFILTRATION; INTRACAUDAL; PERINEURAL ONCE AS NEEDED
Status: DISCONTINUED | OUTPATIENT
Start: 2020-10-16 | End: 2020-10-16 | Stop reason: HOSPADM

## 2020-10-16 RX ORDER — BUPIVACAINE HYDROCHLORIDE 2.5 MG/ML
INJECTION, SOLUTION EPIDURAL; INFILTRATION; INTRACAUDAL
Status: COMPLETED | OUTPATIENT
Start: 2020-10-16 | End: 2020-10-16

## 2020-10-16 RX ORDER — ONDANSETRON 2 MG/ML
INJECTION INTRAMUSCULAR; INTRAVENOUS AS NEEDED
Status: DISCONTINUED | OUTPATIENT
Start: 2020-10-16 | End: 2020-10-16 | Stop reason: SURG

## 2020-10-16 RX ORDER — ASPIRIN 325 MG
325 TABLET, DELAYED RELEASE (ENTERIC COATED) ORAL DAILY
Status: DISCONTINUED | OUTPATIENT
Start: 2020-10-17 | End: 2020-10-18 | Stop reason: HOSPADM

## 2020-10-16 RX ORDER — LIDOCAINE HYDROCHLORIDE 20 MG/ML
INJECTION, SOLUTION INFILTRATION; PERINEURAL AS NEEDED
Status: DISCONTINUED | OUTPATIENT
Start: 2020-10-16 | End: 2020-10-16 | Stop reason: SURG

## 2020-10-16 RX ORDER — CLINDAMYCIN PHOSPHATE 600 MG/50ML
600 INJECTION INTRAVENOUS EVERY 8 HOURS
Status: COMPLETED | OUTPATIENT
Start: 2020-10-16 | End: 2020-10-17

## 2020-10-16 RX ADMIN — MIDAZOLAM 2 MG: 1 INJECTION INTRAMUSCULAR; INTRAVENOUS at 11:12

## 2020-10-16 RX ADMIN — KETAMINE HYDROCHLORIDE 10 MG: 10 INJECTION INTRAMUSCULAR; INTRAVENOUS at 12:44

## 2020-10-16 RX ADMIN — OXYCODONE HYDROCHLORIDE AND ACETAMINOPHEN 1 TABLET: 10; 325 TABLET ORAL at 16:48

## 2020-10-16 RX ADMIN — ROCURONIUM BROMIDE 40 MG: 10 INJECTION, SOLUTION INTRAVENOUS at 11:41

## 2020-10-16 RX ADMIN — BUPIVACAINE HYDROCHLORIDE 10 ML: 5 INJECTION, SOLUTION EPIDURAL; INTRACAUDAL; PERINEURAL at 11:27

## 2020-10-16 RX ADMIN — BUPIVACAINE HYDROCHLORIDE 20 ML: 2.5 INJECTION, SOLUTION EPIDURAL; INFILTRATION; INTRACAUDAL; PERINEURAL at 11:27

## 2020-10-16 RX ADMIN — KETAMINE HYDROCHLORIDE 10 MG: 10 INJECTION INTRAMUSCULAR; INTRAVENOUS at 13:41

## 2020-10-16 RX ADMIN — Medication 2 MG: at 13:40

## 2020-10-16 RX ADMIN — PROPOFOL 200 MG: 10 INJECTION, EMULSION INTRAVENOUS at 11:41

## 2020-10-16 RX ADMIN — CLINDAMYCIN PHOSPHATE 600 MG: 600 INJECTION, SOLUTION INTRAVENOUS at 18:31

## 2020-10-16 RX ADMIN — SODIUM CHLORIDE, POTASSIUM CHLORIDE, SODIUM LACTATE AND CALCIUM CHLORIDE: 600; 310; 30; 20 INJECTION, SOLUTION INTRAVENOUS at 13:44

## 2020-10-16 RX ADMIN — CLINDAMYCIN PHOSPHATE 900 MG: 18 INJECTION, SOLUTION INTRAMUSCULAR; INTRAVENOUS at 11:33

## 2020-10-16 RX ADMIN — KETAMINE HYDROCHLORIDE 30 MG: 10 INJECTION INTRAMUSCULAR; INTRAVENOUS at 11:41

## 2020-10-16 RX ADMIN — FENTANYL CITRATE 100 MCG: 50 INJECTION INTRAMUSCULAR; INTRAVENOUS at 11:37

## 2020-10-16 RX ADMIN — OXYCODONE HYDROCHLORIDE AND ACETAMINOPHEN 2 TABLET: 10; 325 TABLET ORAL at 20:40

## 2020-10-16 RX ADMIN — FENTANYL CITRATE 50 MCG: 50 INJECTION INTRAMUSCULAR; INTRAVENOUS at 11:12

## 2020-10-16 RX ADMIN — GLYCOPYRROLATE 0.4 MG: 0.2 INJECTION INTRAMUSCULAR; INTRAVENOUS at 13:40

## 2020-10-16 RX ADMIN — DEXAMETHASONE SODIUM PHOSPHATE 8 MG: 10 INJECTION INTRAMUSCULAR; INTRAVENOUS at 12:02

## 2020-10-16 RX ADMIN — SODIUM CHLORIDE, POTASSIUM CHLORIDE, SODIUM LACTATE AND CALCIUM CHLORIDE: 600; 310; 30; 20 INJECTION, SOLUTION INTRAVENOUS at 11:33

## 2020-10-16 RX ADMIN — ONDANSETRON 4 MG: 2 INJECTION INTRAMUSCULAR; INTRAVENOUS at 20:15

## 2020-10-16 RX ADMIN — ONDANSETRON HYDROCHLORIDE 4 MG: 2 SOLUTION INTRAMUSCULAR; INTRAVENOUS at 13:40

## 2020-10-16 RX ADMIN — LIDOCAINE HYDROCHLORIDE 100 MG: 20 INJECTION, SOLUTION INFILTRATION; PERINEURAL at 11:41

## 2020-10-16 RX ADMIN — BUPIVACAINE 5 ML: 13.3 INJECTION, SUSPENSION, LIPOSOMAL INFILTRATION at 11:27

## 2020-10-16 NOTE — ANESTHESIA PROCEDURE NOTES
Airway  Urgency: elective    Date/Time: 10/16/2020 11:44 AM  Airway not difficult    General Information and Staff    Patient location during procedure: OR  Anesthesiologist: Petra Ramsay MD  CRNA: Darrin Aguirre CRNA    Indications and Patient Condition  Indications for airway management: airway protection    Preoxygenated: yes  MILS maintained throughout  Mask difficulty assessment: 2 - vent by mask + OA or adjuvant +/- NMBA    Final Airway Details  Final airway type: endotracheal airway      Successful airway: ETT  Cuffed: yes   Successful intubation technique: direct laryngoscopy  Endotracheal tube insertion site: oral  Blade: Clau  Blade size: 3  ETT size (mm): 7.0  Cormack-Lehane Classification: grade I - full view of glottis  Placement verified by: chest auscultation and capnometry   Cuff volume (mL): 8  Measured from: lips  ETT/EBT  to lips (cm): 20  Number of attempts at approach: 1  Assessment: lips, teeth, and gum same as pre-op and atraumatic intubation

## 2020-10-16 NOTE — ANESTHESIA PROCEDURE NOTES
Peripheral Block    Pre-sedation assessment completed: 10/16/2020 11:20 AM    Patient reassessed immediately prior to procedure    Patient location during procedure: pre-op  Start time: 10/16/2020 11:20 AM  Stop time: 10/16/2020 11:27 AM  Reason for block: at surgeon's request and post-op pain management  Performed by  Anesthesiologist: Aly Elaine MD  Preanesthetic Checklist  Completed: patient identified, site marked, surgical consent, pre-op evaluation, timeout performed, IV checked, risks and benefits discussed and monitors and equipment checked  Prep:  Pt Position: supine  Sterile barriers:cap, gloves, mask and sterile barriers  Prep: ChloraPrep  Patient monitoring: blood pressure monitoring, continuous pulse oximetry and EKG  Procedure  Sedation:yes  Performed under: local infiltration  Guidance:ultrasound guided  ULTRASOUND INTERPRETATION.  Using ultrasound guidance a 22 G gauge needle was placed in close proximity to the femoral and sciatic nerve, at which point, under ultrasound guidance anesthetic was injected in the area of the nerve and spread of the anesthesia was seen on ultrasound in close proximity thereto.  There were no abnormalities seen on ultrasound; a digital image was taken; and the patient tolerated the procedure with no complications. Images:still images obtained    Laterality:right  Block Type:adductor canal block, popliteal and sciatic  Injection Technique:single-shot  Needle Type:echogenic  Needle Gauge:22 G  Resistance on Injection: less than 15 psi    Medications Used: bupivacaine liposome (EXPAREL) 1.3 % injection, 5 mL  bupivacaine PF (MARCAINE) 0.25 % injection, 20 mL  bupivacaine (PF) (MARCAINE) 0.5 % injection, 10 mL  Med admintered at 10/16/2020 11:27 AM      Post Assessment  Injection Assessment: negative aspiration for heme, no paresthesia on injection and incremental injection  Patient Tolerance:comfortable throughout block  Complications:no  Additional Notes

## 2020-10-16 NOTE — ANESTHESIA POSTPROCEDURE EVALUATION
Patient: Sophie Ace    Procedure Summary     Date: 10/16/20 Room / Location:  [facility]  [facility]    Anesthesia Start: 1133 Anesthesia Stop: 1410    Procedure: RIGHT  TRIPLE ARTHRODESIS EXCISION OF NAVICULAR ACCESSARY (Right Ankle) Diagnosis:     Surgeon: Aly Martinez Jr., MD Provider: Petra Ramsay MD    Anesthesia Type: general ASA Status: 3          Anesthesia Type: general    Vitals  Vitals Value Taken Time   /77 10/16/20 1430   Temp 36.1 °C (97 °F) 10/16/20 1407   Pulse 64 10/16/20 1432   Resp 16 10/16/20 1420   SpO2 99 % 10/16/20 1432   Vitals shown include unvalidated device data.        Post Anesthesia Care and Evaluation    Patient location during evaluation: bedside  Patient participation: complete - patient participated  Level of consciousness: awake  Pain score: 0  Pain management: adequate  Airway patency: patent  Anesthetic complications: No anesthetic complications  PONV Status: controlled  Cardiovascular status: acceptable  Respiratory status: acceptable  Hydration status: acceptable    Comments: --------------------            10/16/20               1420     --------------------   BP:       127/85     Pulse:      73       Resp:       16       Temp:                SpO2:      100%     --------------------

## 2020-10-17 PROBLEM — M19.071 OSTEOARTHRITIS OF RIGHT HINDFOOT: Status: ACTIVE | Noted: 2020-10-17

## 2020-10-17 PROCEDURE — G0378 HOSPITAL OBSERVATION PER HR: HCPCS

## 2020-10-17 PROCEDURE — 97110 THERAPEUTIC EXERCISES: CPT

## 2020-10-17 PROCEDURE — 97162 PT EVAL MOD COMPLEX 30 MIN: CPT

## 2020-10-17 RX ORDER — OXYCODONE AND ACETAMINOPHEN 10; 325 MG/1; MG/1
TABLET ORAL
Qty: 40 TABLET | Refills: 0 | Status: SHIPPED | OUTPATIENT
Start: 2020-10-17

## 2020-10-17 RX ORDER — ONDANSETRON 4 MG/1
4 TABLET, FILM COATED ORAL EVERY 6 HOURS PRN
Qty: 20 TABLET | Refills: 0 | Status: SHIPPED | OUTPATIENT
Start: 2020-10-17

## 2020-10-17 RX ADMIN — CLINDAMYCIN PHOSPHATE 600 MG: 600 INJECTION, SOLUTION INTRAVENOUS at 04:08

## 2020-10-17 RX ADMIN — OXYCODONE HYDROCHLORIDE AND ACETAMINOPHEN 2 TABLET: 10; 325 TABLET ORAL at 01:04

## 2020-10-17 RX ADMIN — CLINDAMYCIN PHOSPHATE 600 MG: 600 INJECTION, SOLUTION INTRAVENOUS at 13:11

## 2020-10-17 RX ADMIN — OXYCODONE HYDROCHLORIDE AND ACETAMINOPHEN 2 TABLET: 10; 325 TABLET ORAL at 08:12

## 2020-10-17 RX ADMIN — FLUOXETINE HYDROCHLORIDE 20 MG: 20 CAPSULE ORAL at 08:12

## 2020-10-17 RX ADMIN — OXYCODONE HYDROCHLORIDE AND ACETAMINOPHEN 2 TABLET: 10; 325 TABLET ORAL at 12:07

## 2020-10-17 RX ADMIN — DULOXETINE HYDROCHLORIDE 120 MG: 60 CAPSULE, DELAYED RELEASE ORAL at 08:12

## 2020-10-17 RX ADMIN — OXYCODONE HYDROCHLORIDE AND ACETAMINOPHEN 2 TABLET: 10; 325 TABLET ORAL at 16:11

## 2020-10-17 RX ADMIN — PANTOPRAZOLE SODIUM 40 MG: 40 TABLET, DELAYED RELEASE ORAL at 07:28

## 2020-10-17 RX ADMIN — OXYCODONE HYDROCHLORIDE AND ACETAMINOPHEN 2 TABLET: 10; 325 TABLET ORAL at 19:58

## 2020-10-17 RX ADMIN — ASPIRIN 325 MG: 325 TABLET, COATED ORAL at 08:12

## 2020-10-18 VITALS
DIASTOLIC BLOOD PRESSURE: 65 MMHG | SYSTOLIC BLOOD PRESSURE: 99 MMHG | WEIGHT: 265 LBS | RESPIRATION RATE: 16 BRPM | HEIGHT: 66 IN | HEART RATE: 66 BPM | TEMPERATURE: 98 F | OXYGEN SATURATION: 93 % | BODY MASS INDEX: 42.59 KG/M2

## 2020-10-18 PROCEDURE — 97530 THERAPEUTIC ACTIVITIES: CPT

## 2020-10-18 PROCEDURE — G0378 HOSPITAL OBSERVATION PER HR: HCPCS

## 2020-10-18 RX ADMIN — FLUTICASONE PROPIONATE 2 SPRAY: 50 SPRAY, METERED NASAL at 06:10

## 2020-10-18 RX ADMIN — OXYCODONE HYDROCHLORIDE AND ACETAMINOPHEN 2 TABLET: 10; 325 TABLET ORAL at 00:26

## 2020-10-18 RX ADMIN — DULOXETINE HYDROCHLORIDE 120 MG: 60 CAPSULE, DELAYED RELEASE ORAL at 06:10

## 2020-10-18 RX ADMIN — OXYCODONE HYDROCHLORIDE AND ACETAMINOPHEN 2 TABLET: 10; 325 TABLET ORAL at 12:16

## 2020-10-18 RX ADMIN — OXYCODONE HYDROCHLORIDE AND ACETAMINOPHEN 2 TABLET: 10; 325 TABLET ORAL at 08:06

## 2020-10-18 RX ADMIN — PANTOPRAZOLE SODIUM 40 MG: 40 TABLET, DELAYED RELEASE ORAL at 06:13

## 2020-10-18 RX ADMIN — OXYCODONE HYDROCHLORIDE AND ACETAMINOPHEN 2 TABLET: 10; 325 TABLET ORAL at 04:01

## 2020-10-18 RX ADMIN — ASPIRIN 325 MG: 325 TABLET, COATED ORAL at 08:05

## 2020-10-18 RX ADMIN — FLUOXETINE HYDROCHLORIDE 20 MG: 20 CAPSULE ORAL at 06:10

## 2021-02-12 ENCOUNTER — TRANSCRIBE ORDERS (OUTPATIENT)
Dept: ADMINISTRATIVE | Facility: HOSPITAL | Age: 56
End: 2021-02-12

## 2021-02-12 DIAGNOSIS — M25.571 RIGHT ANKLE PAIN, UNSPECIFIED CHRONICITY: Primary | ICD-10-CM

## 2021-02-18 ENCOUNTER — APPOINTMENT (OUTPATIENT)
Dept: CT IMAGING | Facility: HOSPITAL | Age: 56
End: 2021-02-18

## 2021-02-26 ENCOUNTER — HOSPITAL ENCOUNTER (OUTPATIENT)
Dept: CT IMAGING | Facility: HOSPITAL | Age: 56
Discharge: HOME | End: 2021-02-26
Admitting: ORTHOPAEDIC SURGERY

## 2021-02-26 DIAGNOSIS — M25.571 RIGHT ANKLE PAIN, UNSPECIFIED CHRONICITY: ICD-10-CM

## 2021-02-26 PROCEDURE — 73700 CT LOWER EXTREMITY W/O DYE: CPT

## 2021-02-26 PROCEDURE — 76377 3D RENDER W/INTRP POSTPROCES: CPT

## 2021-03-12 NOTE — THERAPY EVALUATION
Acute Care - Physical Therapy Initial Evaluation  [facility]     Patient Name: Sophie Ace  : 1965  MRN: [id number]  Today's Date: 7/10/2018   Onset of Illness/Injury or Date of Surgery: 18  Date of Referral to PT: 07/10/18  Referring Physician: Vee      Admit Date: 2018    Visit Dx:     ICD-10-CM ICD-9-CM   1. Difficulty walking R26.2 719.7     Patient Active Problem List   Diagnosis   • Pain due to internal orthopedic prosthetic devices, implants and grafts, initial encounter (CMS/[facility])   • Recurrent instability of left knee joint     Past Medical History:   Diagnosis Date   • Anxiety and depression    • Arthritis    • Gastric reflux    • Hypertension    • IBS (irritable bowel syndrome)    • Limited joint range of motion     LEFT KNEE   • Limited range of motion (ROM) of shoulder     RIGHT   • Rotator cuff tear     RIGHT   • Seasonal allergies    • Sleep apnea     USES C-PAP   • Vitamin B 12 deficiency    • Vitamin D deficiency      Past Surgical History:   Procedure Laterality Date   •  SECTION  1987   1992    X3   • COLONOSCOPY     • D&C HYSTEROSCOPY ENDOMETRIAL ABLATION  2014   • ESOPHAGEAL DILATATION      X 5   • HIATAL HERNIA REPAIR  2012    WITH LAP BAND   • INGUINAL HERNIA REPAIR Left    • KNEE MINI REVISION Left 2017   • LAPAROSCOPIC CHOLECYSTECTOMY     • LAPAROSCOPIC GASTRIC BANDING     • PARTIAL KNEE ARTHROPLASTY Left    • GA REVISE KNEE JOINT REPLACE,1 PART Left 2017    Procedure: REVISION LT TOTAL KNEE ;  Surgeon: Bhanu Baxter MD;  Location: [facility] MAIN OR;  Service: Orthopedics   • TUBAL ABDOMINAL LIGATION     • UMBILICAL HERNIA REPAIR     • VENTRAL HERNIA REPAIR          PT ASSESSMENT (last 12 hours)      Physical Therapy Evaluation     Row Name 07/10/18 1100          PT Evaluation Time/Intention    Subjective Information complains of;numbness  -EJ     Document Type evaluation  -EJ     Mode of Treatment physical therapy   -EJ     Patient Effort good  -EJ     Symptoms Noted During/After Treatment none  -EJ     Row Name 07/10/18 1100          General Information    Onset of Illness/Injury or Date of Surgery 07/09/18  -EJ     Referring Physician Vee  -EJ     Patient Observations alert;cooperative;agree to therapy  -EJ     Patient/Family Observations  present in room  -EJ     General Observations of Patient supine in bed, no acute distress  -EJ     Prior Level of Function independent:;all household mobility;community mobility;ADL's  -EJ     Equipment Currently Used at Home none  -EJ     Barriers to Rehab none identified  -EJ     Row Name 07/10/18 1100          Relationship/Environment    Lives With spouse  -EJ     Row Name 07/10/18 1100          Resource/Environmental Concerns    Current Living Arrangements home/apartment/condo  -EJ     Row Name 07/10/18 1100          Home Main Entrance    Number of Stairs, Main Entrance three  -EJ     Stair Railings, Main Entrance railings on both sides of stairs  -EJ     Row Name 07/10/18 1100          Stairs Within Home, Primary    Stairs, Within Home, Primary 12  -EJ     Stair Railings, Within Home, Primary railings on both sides of stairs  -     Row Name 07/10/18 1100          Cognitive Assessment/Intervention- PT/OT    Orientation Status (Cognition) oriented x 3  -EJ     Follows Commands (Cognition) WNL  -EJ     Personal Safety Interventions fall prevention program maintained;gait belt;nonskid shoes/slippers when out of bed;supervised activity  -     Row Name 07/10/18 1100          Mobility Assessment/Treatment    Extremity Weight-bearing Status left lower extremity  -EJ     Left Lower Extremity (Weight-bearing Status) weight-bearing as tolerated (WBAT)  -     Row Name 07/10/18 1100          Bed Mobility Assessment/Treatment    Bed Mobility Assessment/Treatment supine-sit;sit-supine  -EJ     Supine-Sit [place] (Bed Mobility) verbal cues;contact guard  -EJ     Sit-Supine  [place] (Bed Mobility) not tested  -EJ     Row Name 07/10/18 1100          Transfer Assessment/Treatment    Transfer Assessment/Treatment sit-stand transfer;stand-sit transfer  -EJ     Sit-Stand [place] (Transfers) verbal cues;contact guard;minimum assist (75% patient effort);2 person assist  -EJ     Stand-Sit [place] (Transfers) verbal cues;contact guard;2 person assist  -EJ     Row Name 07/10/18 1100          Sit-Stand Transfer    Assistive Device (Sit-Stand Transfers) walker, front-wheeled  -EJ     Row Name 07/10/18 1100          Stand-Sit Transfer    Assistive Device (Stand-Sit Transfers) walker, front-wheeled  -EJ     Row Name 07/10/18 1100          Gait/Stairs Assessment/Training    [place] Level (Gait) verbal cues;contact guard;minimum assist (75% patient effort);2 person assist  -     Assistive Device (Gait) walker, front-wheeled  -EJ     Distance in Feet (Gait) 5   limited due to L knee numbness  -EJ     Deviations/Abnormal Patterns (Gait) antalgic;yessica decreased;stride length decreased  -EJ     Row Name 07/10/18 1100          General ROM    GENERAL ROM COMMENTS WFL, x L knee  -EJ     Row Name 07/10/18 1100          General Assessment (Manual Muscle Testing)    Comment, General Manual Muscle Testing (MMT) Assessment post op weakness  -EJ     Row Name 07/10/18 1100          Motor Assessment/Intervention    Additional Documentation Therapeutic Exercise (Group)  -EJ     Row Name 07/10/18 1100          Therapeutic Exercise    Additional Documentation --   L TKR protocol x 10 reps  -EJ     Row Name 07/10/18 1100          Pain Assessment    Additional Documentation Pain Scale: Numbers Pre/Post-Treatment (Group)  -EJ     Row Name 07/10/18 1100          Pain Scale: Numbers Pre/Post-Treatment    Pain Scale: Numbers, Pretreatment 0/10 - no pain  -EJ     Pain Scale: Numbers, Post-Treatment 0/10 - no pain  -EJ     Pain Location - Side Left  -     Pain Location knee  -[facility]              Wound 07/09/18 1759 Left knee incision    Wound - Properties Group Date first assessed: 07/09/18  -KM Time first assessed: 1759  -KM Side: Left  -KM Location: knee  -KM Type: incision  -KM    Row Name 07/10/18 1100          Plan of Care Review    Plan of Care Reviewed With patient;spouse  -EJ     Row Name 07/10/18 1100          Physical Therapy Clinical Impression    Date of Referral to PT 07/10/18  -EJ     PT Diagnosis (PT Clinical Impression) s/p L TKR revision  -EJ     Patient/Family Goals Statement (PT Clinical Impression) Pt plans home today or tom  -EJ     Criteria for Skilled Interventions Met (PT Clinical Impression) treatment indicated  -EJ     Impairments Found (describe specific impairments) gait, locomotion, and balance  -EJ     Rehab Potential (PT Clinical Summary) good, to achieve stated therapy goals  -EJ     Row Name 07/10/18 1100          Physical Therapy Goals    Transfer Goal Selection (PT) transfer, PT goal 1  -EJ     Gait Training Goal Selection (PT) gait training, PT goal 1  -EJ     ROM Goal Selection (PT) ROM, PT goal 1  -EJ     Stairs Goal Selection (PT) stairs, PT goal 1  -EJ     Additional Documentation ROM Goal Selection (PT) (Row);Stairs Goal Selection (PT) (Row)  -EJ     Row Name 07/10/18 1100          Transfer Goal 1 (PT)    Activity/Assistive Device (Transfer Goal 1, PT) transfers, all;walker, rolling  -EJ     [place] Level/Cues Needed (Transfer Goal 1, PT) standby assist  -EJ     Time Frame (Transfer Goal 1, PT) 3 days  -EJ     Row Name 07/10/18 1100          Gait Training Goal 1 (PT)    Activity/Assistive Device (Gait Training Goal 1, PT) gait (walking locomotion);walker, rolling  -EJ     Cloud Level (Gait Training Goal 1, PT) standby assist  -EJ     Distance (Gait Goal 1, PT) 100  -EJ     Time Frame (Gait Training Goal 1, PT) 3 days  -EJ     Row Name 07/10/18 1100          ROM Goal 1 (PT)    ROM Goal 1 (PT) L knee 5-90  -EJ     Time Frame (ROM Goal 1, PT) 3 days  -EJ      Row Name 07/10/18 1100          Stairs Goal 1 (PT)    Activity/Assistive Device (Stairs Goal 1, PT) stairs, all skills  -EJ     [place] Level/Cues Needed (Stairs Goal 1, PT) contact guard assist  -EJ     Number of Stairs (Stairs Goal 1, PT) 4  -EJ     Time Frame (Stairs Goal 1, PT) 3 days  -EJ     Row Name 07/10/18 1100          Positioning and Restraints    Pre-Treatment Position in bed  -EJ     Post Treatment Position chair  -EJ     In Chair notified nsg;reclined;call light within reach;encouraged to call for assist;with family/caregiver  -EJ     Row Name 07/10/18 1100          Living Environment    Home Accessibility stairs to enter home;stairs within home  -EJ       User Key  (r) = Recorded By, (t) = Taken By, (c) = Cosigned By    Initials Name Provider Type    REBECA Tavares, RN Registered Nurse    SHELBY Adams, PT Physical Therapist          Physical Therapy Education     Title: PT OT SLP Therapies (Done)     Topic: Physical Therapy (Done)     Point: Mobility training (Done)    Learning Progress Summary     Learner Status Readiness Method Response Comment Documented by    Patient Done Acceptance LUISANA,SIDDHARTH BRAVO,TIFFANIE   07/10/18 1122     Done Acceptance LAWRENCE LIEBERMAN DU   07/10/18 0022    Family Done Acceptance LAWRENCE LIEBERMAN D VU,TIFFANIE   07/10/18 1122          Point: Home exercise program (Done)    Learning Progress Summary     Learner Status Readiness Method Response Comment Documented by    Patient Done Acceptance LAWRENCE LIEBERMAN D VU,TIFFANIE   07/10/18 1122    Family Done Acceptance ELAWRENCE D VU,TIFFANIE   07/10/18 1122          Point: Body mechanics (Done)    Learning Progress Summary     Learner Status Readiness Method Response Comment Documented by    Patient Done Acceptance LAWRENCE LIEBERMAN DU   07/10/18 0022          Point: Precautions (Done)    Learning Progress Summary     Learner Status Readiness Method Response Comment Documented by    Patient Done Acceptance LAWRENCE LIEBERMAN DU   07/10/18 0022                      User Key      Initials Effective Dates Name Provider Type Discipline     04/03/18 -  Erika Adams, PT Physical Therapist PT     12/01/16 -  Tanna Arriaga RN Registered Nurse Nurse                PT Recommendation and Plan  Anticipated Discharge Disposition (PT): home with assist, home with home health  Planned Therapy Interventions (PT Eval): bed mobility training, gait training, home exercise program, patient/family education, ROM (range of motion), stair training, strengthening, stretching, transfer training  Therapy Frequency (PT Clinical Impression): 2 times/day  Outcome Summary/Treatment Plan (PT)  Anticipated Discharge Disposition (PT): home with assist, home with home health  Plan of Care Reviewed With: patient, spouse  Progress: improving  Outcome Summary: Pt doing well, s/p L TKR revision. She reports she still has numbness in LLE. She was able to complete total knee exercises and take several steps to transfer to chair. Possible DC later today or tomorrow. Plan to see again this pm. Pt will continue ot benefit from skilled PT to maximize safety and independence with mobility.,          Outcome Measures     Row Name 07/10/18 1100             How much help from another person do you currently need...    Turning from your back to your side while in flat bed without using bedrails? 3  -EJ      Moving from lying on back to sitting on the side of a flat bed without bedrails? 3  -EJ      Moving to and from a bed to a chair (including a wheelchair)? 3  -EJ      Standing up from a chair using your arms (e.g., wheelchair, bedside chair)? 3  -EJ      Climbing 3-5 steps with a railing? 2  -EJ      To walk in hospital room? 3  -EJ      AM-PAC 6 Clicks Score 17  -EJ         Functional Assessment    Outcome Measure Options AM-PAC 6 Clicks Basic Mobility (PT)  -EJ        User Key  (r) = Recorded By, (t) = Taken By, (c) = Cosigned By    Initials Name Provider Type    SHELBY Adams, PT Physical Therapist           Time  Calculation:         PT Charges     Row Name 07/10/18 1131             Time Calculation    Start Time 1100  -EJ      Stop Time 1120  -EJ      Time Calculation (min) 20 min  -EJ      PT Received On 07/10/18  -EJ      PT - Next Appointment 07/10/18  -EJ      PT Goal Re-Cert Due Date 07/13/18  -EJ         Timed Charges    63416 - PT Therapeutic Exercise Minutes 10  -EJ        User Key  (r) = Recorded By, (t) = Taken By, (c) = Cosigned By    Initials Name Provider Type    EJ Erika Adams, PT Physical Therapist        Therapy Suggested Charges     Code   Minutes Charges    96148 (CPT®) Hc Pt Neuromusc Re Education Ea 15 Min      15012 (CPT®) Hc Pt Ther Proc Ea 15 Min 10 1    17397 (CPT®) Hc Gait Training Ea 15 Min      37229 (CPT®) Hc Pt Therapeutic Act Ea 15 Min      37847 (CPT®) Hc Pt Manual Therapy Ea 15 Min      19306 (CPT®) Hc Pt Iontophoresis Ea 15 Min      43410 (CPT®) Hc Pt Elec Stim Ea-Per 15 Min      83072 (CPT®) Hc Pt Ultrasound Ea 15 Min      32995 (CPT®) Hc Pt Self Care/Mgmt/Train Ea 15 Min      Total  10 1        Therapy Charges for Today     Code Description Service Date Service Provider Modifiers Qty    04228234275 HC PT EVAL MOD COMPLEXITY 2 7/10/2018 Erika Adams, PT GP 1    12188674448 HC PT THER PROC EA 15 MIN 7/10/2018 Erika Adams, PT GP 1    43236995929 HC PT THER SUPP EA 15 MIN 7/10/2018 Erika Adams, PT GP 1          PT G-Codes  Outcome Measure Options: AM-PAC 6 Clicks Basic Mobility (PT)      Erika Adams, PT  7/10/2018        Negative

## 2021-03-30 ENCOUNTER — BULK ORDERING (OUTPATIENT)
Dept: CASE MANAGEMENT | Facility: OTHER | Age: 56
End: 2021-03-30

## 2021-03-30 DIAGNOSIS — Z23 IMMUNIZATION DUE: ICD-10-CM

## 2024-07-14 ENCOUNTER — APPOINTMENT (OUTPATIENT)
Dept: CT IMAGING | Facility: HOSPITAL | Age: 59
End: 2024-07-14
Payer: COMMERCIAL

## 2024-07-14 ENCOUNTER — HOSPITAL ENCOUNTER (OUTPATIENT)
Facility: HOSPITAL | Age: 59
Setting detail: OBSERVATION
Discharge: HOME | End: 2024-07-15
Attending: EMERGENCY MEDICINE | Admitting: EMERGENCY MEDICINE
Payer: COMMERCIAL

## 2024-07-14 DIAGNOSIS — N13.2 HYDRONEPHROSIS WITH URINARY OBSTRUCTION DUE TO URETERAL CALCULUS: Primary | ICD-10-CM

## 2024-07-14 PROBLEM — N20.0 KIDNEY STONE: Status: ACTIVE | Noted: 2024-07-14

## 2024-07-14 PROBLEM — N20.1 RIGHT URETERAL STONE: Status: ACTIVE | Noted: 2024-07-14

## 2024-07-14 LAB
ALBUMIN SERPL-MCNC: 4.3 G/DL (ref 3.5–5.2)
ALBUMIN/GLOB SERPL: 1.2 G/DL
ALP SERPL-CCNC: 84 U/L (ref 39–117)
ALT SERPL W P-5'-P-CCNC: 12 U/L (ref 1–33)
ANION GAP SERPL CALCULATED.3IONS-SCNC: 17.4 MMOL/L (ref 5–15)
AST SERPL-CCNC: 16 U/L (ref 1–32)
BACTERIA UR QL AUTO: ABNORMAL /HPF
BASOPHILS # BLD AUTO: 0.04 10*3/MM3 (ref 0–0.2)
BASOPHILS NFR BLD AUTO: 0.5 % (ref 0–1.5)
BILIRUB SERPL-MCNC: 0.8 MG/DL (ref 0–1.2)
BILIRUB UR QL STRIP: NEGATIVE
BUN SERPL-MCNC: 17 MG/DL (ref 8–23)
BUN/CREAT SERPL: 25.4 (ref 7–25)
CALCIUM SPEC-SCNC: 9.6 MG/DL (ref 8.6–10.5)
CHLORIDE SERPL-SCNC: 100 MMOL/L (ref 98–107)
CLARITY UR: CLEAR
CO2 SERPL-SCNC: 24.6 MMOL/L (ref 22–29)
COLOR UR: YELLOW
CREAT SERPL-MCNC: 0.67 MG/DL (ref 0.57–1)
DEPRECATED RDW RBC AUTO: 43.8 FL (ref 37–54)
DEPRECATED RDW RBC AUTO: 44.1 FL (ref 37–54)
EGFRCR SERPLBLD CKD-EPI 2021: 100.2 ML/MIN/1.73
EOSINOPHIL # BLD AUTO: 0.02 10*3/MM3 (ref 0–0.4)
EOSINOPHIL NFR BLD AUTO: 0.2 % (ref 0.3–6.2)
ERYTHROCYTE [DISTWIDTH] IN BLOOD BY AUTOMATED COUNT: 13.1 % (ref 12.3–15.4)
ERYTHROCYTE [DISTWIDTH] IN BLOOD BY AUTOMATED COUNT: 13.1 % (ref 12.3–15.4)
GLOBULIN UR ELPH-MCNC: 3.5 GM/DL
GLUCOSE SERPL-MCNC: 107 MG/DL (ref 65–99)
GLUCOSE UR STRIP-MCNC: NEGATIVE MG/DL
HCT VFR BLD AUTO: 38.5 % (ref 34–46.6)
HCT VFR BLD AUTO: 43.8 % (ref 34–46.6)
HGB BLD-MCNC: 12.2 G/DL (ref 12–15.9)
HGB BLD-MCNC: 14.2 G/DL (ref 12–15.9)
HGB UR QL STRIP.AUTO: ABNORMAL
HYALINE CASTS UR QL AUTO: ABNORMAL /LPF
IMM GRANULOCYTES # BLD AUTO: 0.03 10*3/MM3 (ref 0–0.05)
IMM GRANULOCYTES NFR BLD AUTO: 0.3 % (ref 0–0.5)
KETONES UR QL STRIP: ABNORMAL
LEUKOCYTE ESTERASE UR QL STRIP.AUTO: ABNORMAL
LYMPHOCYTES # BLD AUTO: 0.75 10*3/MM3 (ref 0.7–3.1)
LYMPHOCYTES NFR BLD AUTO: 8.7 % (ref 19.6–45.3)
MAGNESIUM SERPL-MCNC: 2 MG/DL (ref 1.6–2.4)
MCH RBC QN AUTO: 29.1 PG (ref 26.6–33)
MCH RBC QN AUTO: 29.5 PG (ref 26.6–33)
MCHC RBC AUTO-ENTMCNC: 31.7 G/DL (ref 31.5–35.7)
MCHC RBC AUTO-ENTMCNC: 32.4 G/DL (ref 31.5–35.7)
MCV RBC AUTO: 91.1 FL (ref 79–97)
MCV RBC AUTO: 91.9 FL (ref 79–97)
MONOCYTES # BLD AUTO: 0.64 10*3/MM3 (ref 0.1–0.9)
MONOCYTES NFR BLD AUTO: 7.4 % (ref 5–12)
NEUTROPHILS NFR BLD AUTO: 7.18 10*3/MM3 (ref 1.7–7)
NEUTROPHILS NFR BLD AUTO: 82.9 % (ref 42.7–76)
NITRITE UR QL STRIP: NEGATIVE
NRBC BLD AUTO-RTO: 0 /100 WBC (ref 0–0.2)
PH UR STRIP.AUTO: 7.5 [PH] (ref 5–8)
PLATELET # BLD AUTO: 158 10*3/MM3 (ref 140–450)
PLATELET # BLD AUTO: 96 10*3/MM3 (ref 140–450)
PMV BLD AUTO: 11.4 FL (ref 6–12)
PMV BLD AUTO: 12.1 FL (ref 6–12)
POTASSIUM SERPL-SCNC: 3.1 MMOL/L (ref 3.5–5.2)
PROT SERPL-MCNC: 7.8 G/DL (ref 6–8.5)
PROT UR QL STRIP: ABNORMAL
RBC # BLD AUTO: 4.19 10*6/MM3 (ref 3.77–5.28)
RBC # BLD AUTO: 4.81 10*6/MM3 (ref 3.77–5.28)
RBC # UR STRIP: ABNORMAL /HPF
REF LAB TEST METHOD: ABNORMAL
SODIUM SERPL-SCNC: 142 MMOL/L (ref 136–145)
SP GR UR STRIP: 1.02 (ref 1–1.03)
SQUAMOUS #/AREA URNS HPF: ABNORMAL /HPF
UROBILINOGEN UR QL STRIP: ABNORMAL
WBC # UR STRIP: ABNORMAL /HPF
WBC NRBC COR # BLD AUTO: 8.66 10*3/MM3 (ref 3.4–10.8)
WBC NRBC COR # BLD AUTO: 9.13 10*3/MM3 (ref 3.4–10.8)

## 2024-07-14 PROCEDURE — 96376 TX/PRO/DX INJ SAME DRUG ADON: CPT

## 2024-07-14 PROCEDURE — 25810000003 SODIUM CHLORIDE 0.9 % SOLUTION: Performed by: NURSE PRACTITIONER

## 2024-07-14 PROCEDURE — G0378 HOSPITAL OBSERVATION PER HR: HCPCS

## 2024-07-14 PROCEDURE — 74176 CT ABD & PELVIS W/O CONTRAST: CPT

## 2024-07-14 PROCEDURE — 25010000002 HYDROMORPHONE PER 4 MG: Performed by: EMERGENCY MEDICINE

## 2024-07-14 PROCEDURE — 36415 COLL VENOUS BLD VENIPUNCTURE: CPT

## 2024-07-14 PROCEDURE — 85025 COMPLETE CBC W/AUTO DIFF WBC: CPT | Performed by: NURSE PRACTITIONER

## 2024-07-14 PROCEDURE — 25010000002 HYDROMORPHONE PER 4 MG: Performed by: NURSE PRACTITIONER

## 2024-07-14 PROCEDURE — 83735 ASSAY OF MAGNESIUM: CPT | Performed by: NURSE PRACTITIONER

## 2024-07-14 PROCEDURE — 25010000002 KETOROLAC TROMETHAMINE PER 15 MG: Performed by: NURSE PRACTITIONER

## 2024-07-14 PROCEDURE — 25010000002 ONDANSETRON PER 1 MG: Performed by: NURSE PRACTITIONER

## 2024-07-14 PROCEDURE — 85025 COMPLETE CBC W/AUTO DIFF WBC: CPT | Performed by: EMERGENCY MEDICINE

## 2024-07-14 PROCEDURE — 96361 HYDRATE IV INFUSION ADD-ON: CPT

## 2024-07-14 PROCEDURE — 80053 COMPREHEN METABOLIC PANEL: CPT | Performed by: NURSE PRACTITIONER

## 2024-07-14 PROCEDURE — 96375 TX/PRO/DX INJ NEW DRUG ADDON: CPT

## 2024-07-14 PROCEDURE — 81001 URINALYSIS AUTO W/SCOPE: CPT | Performed by: NURSE PRACTITIONER

## 2024-07-14 PROCEDURE — 99285 EMERGENCY DEPT VISIT HI MDM: CPT

## 2024-07-14 RX ORDER — AMOXICILLIN 250 MG
2 CAPSULE ORAL 2 TIMES DAILY PRN
Status: DISCONTINUED | OUTPATIENT
Start: 2024-07-14 | End: 2024-07-15 | Stop reason: HOSPADM

## 2024-07-14 RX ORDER — BISACODYL 5 MG/1
5 TABLET, DELAYED RELEASE ORAL DAILY PRN
Status: DISCONTINUED | OUTPATIENT
Start: 2024-07-14 | End: 2024-07-15 | Stop reason: HOSPADM

## 2024-07-14 RX ORDER — HYDROCODONE BITARTRATE AND ACETAMINOPHEN 5; 325 MG/1; MG/1
1 TABLET ORAL EVERY 6 HOURS PRN
Status: DISCONTINUED | OUTPATIENT
Start: 2024-07-14 | End: 2024-07-15 | Stop reason: HOSPADM

## 2024-07-14 RX ORDER — POTASSIUM CHLORIDE 1.5 G/1.58G
40 POWDER, FOR SOLUTION ORAL EVERY 4 HOURS
Status: DISPENSED | OUTPATIENT
Start: 2024-07-14 | End: 2024-07-15

## 2024-07-14 RX ORDER — FLUTICASONE PROPIONATE 50 MCG
2 SPRAY, SUSPENSION (ML) NASAL DAILY
Status: DISCONTINUED | OUTPATIENT
Start: 2024-07-15 | End: 2024-07-15 | Stop reason: HOSPADM

## 2024-07-14 RX ORDER — SODIUM CHLORIDE 9 MG/ML
40 INJECTION, SOLUTION INTRAVENOUS AS NEEDED
Status: DISCONTINUED | OUTPATIENT
Start: 2024-07-14 | End: 2024-07-15 | Stop reason: HOSPADM

## 2024-07-14 RX ORDER — DULOXETIN HYDROCHLORIDE 60 MG/1
120 CAPSULE, DELAYED RELEASE ORAL DAILY
Status: DISCONTINUED | OUTPATIENT
Start: 2024-07-15 | End: 2024-07-15 | Stop reason: HOSPADM

## 2024-07-14 RX ORDER — HYDROMORPHONE HYDROCHLORIDE 1 MG/ML
0.5 INJECTION, SOLUTION INTRAMUSCULAR; INTRAVENOUS; SUBCUTANEOUS ONCE
Status: COMPLETED | OUTPATIENT
Start: 2024-07-14 | End: 2024-07-14

## 2024-07-14 RX ORDER — SODIUM CHLORIDE 0.9 % (FLUSH) 0.9 %
10 SYRINGE (ML) INJECTION AS NEEDED
Status: DISCONTINUED | OUTPATIENT
Start: 2024-07-14 | End: 2024-07-15 | Stop reason: HOSPADM

## 2024-07-14 RX ORDER — POTASSIUM CHLORIDE 7.45 MG/ML
10 INJECTION INTRAVENOUS
Status: COMPLETED | OUTPATIENT
Start: 2024-07-15 | End: 2024-07-15

## 2024-07-14 RX ORDER — SODIUM CHLORIDE 0.9 % (FLUSH) 0.9 %
10 SYRINGE (ML) INJECTION EVERY 12 HOURS SCHEDULED
Status: DISCONTINUED | OUTPATIENT
Start: 2024-07-14 | End: 2024-07-15 | Stop reason: HOSPADM

## 2024-07-14 RX ORDER — ONDANSETRON 2 MG/ML
4 INJECTION INTRAMUSCULAR; INTRAVENOUS ONCE
Status: COMPLETED | OUTPATIENT
Start: 2024-07-14 | End: 2024-07-14

## 2024-07-14 RX ORDER — ONDANSETRON 2 MG/ML
4 INJECTION INTRAMUSCULAR; INTRAVENOUS EVERY 6 HOURS PRN
Status: DISCONTINUED | OUTPATIENT
Start: 2024-07-14 | End: 2024-07-15 | Stop reason: HOSPADM

## 2024-07-14 RX ORDER — HYDROMORPHONE HYDROCHLORIDE 1 MG/ML
0.5 INJECTION, SOLUTION INTRAMUSCULAR; INTRAVENOUS; SUBCUTANEOUS EVERY 4 HOURS PRN
Status: DISCONTINUED | OUTPATIENT
Start: 2024-07-14 | End: 2024-07-15 | Stop reason: HOSPADM

## 2024-07-14 RX ORDER — ACETAMINOPHEN 325 MG/1
650 TABLET ORAL EVERY 6 HOURS PRN
Status: DISCONTINUED | OUTPATIENT
Start: 2024-07-14 | End: 2024-07-15 | Stop reason: HOSPADM

## 2024-07-14 RX ORDER — BISACODYL 10 MG
10 SUPPOSITORY, RECTAL RECTAL DAILY PRN
Status: DISCONTINUED | OUTPATIENT
Start: 2024-07-14 | End: 2024-07-15 | Stop reason: HOSPADM

## 2024-07-14 RX ORDER — POTASSIUM CHLORIDE 29.8 MG/ML
20 INJECTION INTRAVENOUS ONCE
Status: DISCONTINUED | OUTPATIENT
Start: 2024-07-15 | End: 2024-07-14

## 2024-07-14 RX ORDER — KETOROLAC TROMETHAMINE 15 MG/ML
15 INJECTION, SOLUTION INTRAMUSCULAR; INTRAVENOUS ONCE
Status: COMPLETED | OUTPATIENT
Start: 2024-07-14 | End: 2024-07-14

## 2024-07-14 RX ORDER — SODIUM CHLORIDE AND POTASSIUM CHLORIDE 300; 900 MG/100ML; MG/100ML
100 INJECTION, SOLUTION INTRAVENOUS CONTINUOUS
Status: DISCONTINUED | OUTPATIENT
Start: 2024-07-15 | End: 2024-07-15 | Stop reason: HOSPADM

## 2024-07-14 RX ORDER — LUBIPROSTONE 8 UG/1
8 CAPSULE ORAL 2 TIMES DAILY WITH MEALS
Status: DISCONTINUED | OUTPATIENT
Start: 2024-07-15 | End: 2024-07-15 | Stop reason: HOSPADM

## 2024-07-14 RX ORDER — POLYETHYLENE GLYCOL 3350 17 G/17G
17 POWDER, FOR SOLUTION ORAL DAILY PRN
Status: DISCONTINUED | OUTPATIENT
Start: 2024-07-14 | End: 2024-07-15 | Stop reason: HOSPADM

## 2024-07-14 RX ORDER — SODIUM CHLORIDE 9 MG/ML
100 INJECTION, SOLUTION INTRAVENOUS CONTINUOUS
Status: DISCONTINUED | OUTPATIENT
Start: 2024-07-14 | End: 2024-07-14

## 2024-07-14 RX ORDER — KETOROLAC TROMETHAMINE 15 MG/ML
15 INJECTION, SOLUTION INTRAMUSCULAR; INTRAVENOUS EVERY 6 HOURS PRN
Status: DISCONTINUED | OUTPATIENT
Start: 2024-07-14 | End: 2024-07-15 | Stop reason: HOSPADM

## 2024-07-14 RX ORDER — PANTOPRAZOLE SODIUM 40 MG/1
40 TABLET, DELAYED RELEASE ORAL
Status: DISCONTINUED | OUTPATIENT
Start: 2024-07-15 | End: 2024-07-15 | Stop reason: HOSPADM

## 2024-07-14 RX ADMIN — ONDANSETRON 4 MG: 2 INJECTION INTRAMUSCULAR; INTRAVENOUS at 19:54

## 2024-07-14 RX ADMIN — POTASSIUM CHLORIDE 40 MEQ: 1.5 POWDER, FOR SOLUTION ORAL at 17:05

## 2024-07-14 RX ADMIN — HYDROMORPHONE HYDROCHLORIDE 0.5 MG: 1 INJECTION, SOLUTION INTRAMUSCULAR; INTRAVENOUS; SUBCUTANEOUS at 14:08

## 2024-07-14 RX ADMIN — HYDROMORPHONE HYDROCHLORIDE 0.5 MG: 1 INJECTION, SOLUTION INTRAMUSCULAR; INTRAVENOUS; SUBCUTANEOUS at 19:55

## 2024-07-14 RX ADMIN — HYDROCODONE BITARTRATE AND ACETAMINOPHEN 1 TABLET: 5; 325 TABLET ORAL at 17:05

## 2024-07-14 RX ADMIN — SODIUM CHLORIDE 100 ML/HR: 9 INJECTION, SOLUTION INTRAVENOUS at 15:39

## 2024-07-14 RX ADMIN — SODIUM CHLORIDE 1000 ML: 9 INJECTION, SOLUTION INTRAVENOUS at 11:43

## 2024-07-14 RX ADMIN — Medication 10 ML: at 19:55

## 2024-07-14 RX ADMIN — HYDROMORPHONE HYDROCHLORIDE 0.5 MG: 1 INJECTION, SOLUTION INTRAMUSCULAR; INTRAVENOUS; SUBCUTANEOUS at 11:41

## 2024-07-14 RX ADMIN — SODIUM CHLORIDE 100 ML/HR: 9 INJECTION, SOLUTION INTRAVENOUS at 20:13

## 2024-07-14 RX ADMIN — ONDANSETRON 4 MG: 2 INJECTION INTRAMUSCULAR; INTRAVENOUS at 11:41

## 2024-07-14 RX ADMIN — ACETAMINOPHEN 325MG 650 MG: 325 TABLET ORAL at 21:35

## 2024-07-14 RX ADMIN — POTASSIUM CHLORIDE 40 MEQ: 1.5 POWDER, FOR SOLUTION ORAL at 19:58

## 2024-07-14 RX ADMIN — KETOROLAC TROMETHAMINE 15 MG: 15 INJECTION, SOLUTION INTRAMUSCULAR; INTRAVENOUS at 15:36

## 2024-07-14 NOTE — ED PROVIDER NOTES
EMERGENCY DEPARTMENT MD ATTESTATION NOTE    Room Number:  03/03  PCP: Kim Barr MD  Independent Historians: Patient    HPI:  A complete HPI/ROS/PMH/PSH/SH/FH are unobtainable due to: None    Chronic or social conditions impacting patient care (Social Determinants of Health): None      Context: Sophie Ace is a 59 y.o. female with a medical history of IBS and chronic pain who presents to the ED c/o acute persistent and worsening pain in the right flank that radiates into the right lower abdomen causing some nausea and vomiting symptoms today.  Patient denies any fevers.  Denies any dysuria or hematuria symptoms.  She has been taking her usual pain medication prescription but it has not provided any relief so far.      Review of prior external notes (non-ED) -and- Review of prior external test results outside of this encounter: I independently reviewed the internal medicine progress note from October 2, 2023 when she had annual physical exam and checkup.  Follow-up care for GERD noted with prescription for Nexium.    Prescription drug monitoring program review: JEAN reviewed by Carlos Beltran MD, Toñito Richards MD   N/A and JEAN query complete and reviewed. Patient receives regular prescriptions for controlled substances.      PHYSICAL EXAM    I have reviewed the triage vital signs and nursing notes.    ED Triage Vitals   Temp Heart Rate Resp BP SpO2   07/14/24 1057 07/14/24 1057 07/14/24 1057 07/14/24 1110 07/14/24 1057   99.8 °F (37.7 °C) 81 28 147/92 97 %      Temp src Heart Rate Source Patient Position BP Location FiO2 (%)   07/14/24 1057 07/14/24 1057 -- -- --   Tympanic Monitor          Physical Exam  GENERAL: alert, appears uncomfortable / in pain  SKIN: Warm, dry, no rashes  HENT: Normocephalic, atraumatic  EYES: no scleral icterus  CV: regular rhythm, regular rate  RESPIRATORY: normal effort, lungs clear  ABDOMEN: soft, nontender, nondistended  MUSCULOSKELETAL: no deformity, no asymmetry to  the extremities  NEURO: alert, moves all extremities, follows commands            MEDICATIONS GIVEN IN ER  Medications   sodium chloride 0.9 % flush 10 mL (has no administration in time range)   Potassium Replacement - Follow Nurse / BPA Driven Protocol (has no administration in time range)   Magnesium Cardiology Dose Replacement - Follow Nurse / BPA Driven Protocol (has no administration in time range)   Phosphorus Replacement - Follow Nurse / BPA Driven Protocol (has no administration in time range)   Calcium Replacement - Follow Nurse / BPA Driven Protocol (has no administration in time range)   sodium chloride 0.9 % flush 10 mL (has no administration in time range)   sodium chloride 0.9 % flush 10 mL (has no administration in time range)   sodium chloride 0.9 % infusion 40 mL (has no administration in time range)   ketorolac (TORADOL) injection 15 mg (has no administration in time range)   sennosides-docusate (PERICOLACE) 8.6-50 MG per tablet 2 tablet (has no administration in time range)     And   polyethylene glycol (MIRALAX) packet 17 g (has no administration in time range)     And   bisacodyl (DULCOLAX) EC tablet 5 mg (has no administration in time range)     And   bisacodyl (DULCOLAX) suppository 10 mg (has no administration in time range)   ondansetron (ZOFRAN) injection 4 mg (has no administration in time range)   HYDROmorphone (DILAUDID) injection 0.5 mg (has no administration in time range)   HYDROcodone-acetaminophen (NORCO) 5-325 MG per tablet 1 tablet (has no administration in time range)   sodium chloride 0.9 % infusion (100 mL/hr Intravenous New Bag 7/14/24 1539)   sodium chloride 0.9 % bolus 1,000 mL (0 mL Intravenous Stopped 7/14/24 1502)   HYDROmorphone (DILAUDID) injection 0.5 mg (0.5 mg Intravenous Given 7/14/24 1141)   ondansetron (ZOFRAN) injection 4 mg (4 mg Intravenous Given 7/14/24 1141)   HYDROmorphone (DILAUDID) injection 0.5 mg (0.5 mg Intravenous Given 7/14/24 1408)   ketorolac  (TORADOL) injection 15 mg (15 mg Intravenous Given 7/14/24 1536)         ORDERS PLACED DURING THIS VISIT:  Orders Placed This Encounter   Procedures    CT Abdomen Pelvis Without Contrast    Comprehensive Metabolic Panel    Urinalysis With Microscopic If Indicated (No Culture) - Urine, Clean Catch    CBC Auto Differential    Urinalysis, Microscopic Only - Urine, Clean Catch    CBC Auto Differential    Magnesium    Basic Metabolic Panel    NPO Diet NPO Type: Sips with Meds    Vital Signs    Intake & Output    Weigh Patient    Oral Care    Saline Lock & Maintain IV Access    Urology (on-call MD unless specified)    Inpatient Urology Consult    Insert Peripheral IV    Insert Peripheral IV    Initiate ED Observation Status    CBC & Differential    CBC & Differential    CBC & Differential         PROCEDURES  Procedures            PROGRESS, DATA ANALYSIS, CONSULTS, AND MEDICAL DECISION MAKING  All labs have been independently interpreted by me.  All radiology studies have been reviewed by me. All EKG's have been independently viewed and interpreted by me.  Discussion below represents my analysis of pertinent findings related to patient's condition, differential diagnosis, treatment plan and final disposition.    Differential diagnosis includes but is not limited to kidney stone, pyelonephritis, appendicitis, biliary colic, IBS, bowel obstruction.    Clinical Scores:                   ED Course as of 07/14/24 1552   Sun Jul 14, 2024   1130 59-year-old female who presents with right flank pain that radiates around to her right lower abdomen.  No history of kidney stones.  Plan for labs, urinalysis, CT of the abdomen and pelvis.  IV fluids and pain control ordered also. [MS]   1203 Nitrite, UA: Negative [MS]   1203 RBC, UA(!): Too Numerous to Count [MS]   1244 WBC: 9.13 [MS]   1244 Hemoglobin: 14.2 [MS]   1244 Hematocrit: 43.8 [MS]   1409 Reviewed pt's history and workup with Dr. Richards.  After a bedside evaluation, he agrees  with the plan of care.     [MS]   1507 Discussed with Dr Worthy, on call urologist, regarding patient's relevant history, exam, workup and ED findings/concerns.  H3 agrees to see patient in consult.  He is okay with patient having Toradol.     [MS]   1512 Consult Note    Discussed care with Tere Handy  Reviewed patient's history, exam, results and need for admission secondary to R ureteral stone  Dr. Beltran accepts the patient to be admitted to observation unit bed.     [MS]      ED Course User Index  [MS] KeJennifer prado R, APRN       MDM:   I independently interpreted the CT abdomen and pelvis study and my findings are: No free air, no bowel obstruction.  There is a right proximal ureteral stone with hydronephrosis features notable    I have reviewed all the labs from today's ER workup.  Hematuria is noted to the urinalysis sample but no suggestion of UTI.  The BUN and creatinine are normal range.  There is some mild hypokalemia.  Patient had some recurrence of pain after her initial dose of analgesics here.  I am concerned that she may not be able to pass this kidney stone spontaneously given its size and location.  Will make plans to consult urology services and place the patient in observation unit for further symptomatic management tonight.  She is agreeable with this plan as outlined.    COMPLEXITY OF CARE  The patient requires admission.    Please note that portions of this document were completed with a voice recognition program.    Note Disclaimer: At [facility], we believe that sharing information builds trust and better relationships. You are receiving this note because you recently visited [facility]. It is possible you will see health information before a provider has talked with you about it. This kind of information can be easy to misunderstand. To help you fully understand what it means for your health, we urge you to discuss this note with your provider.         Toñito Richards  MD GHASSAN  07/14/24 9782

## 2024-07-14 NOTE — CONSULTS
FIRST UROLOGY CONSULT      Patient Identification:  NAME:  Sophie Ace  Age:  59 y.o.   Sex:  female   :  1965   MRN:  [id number]     Chief complaint:  Right side hurts    History of present illness:      58 yo female with acute onset of right flank pain, assoc with n/v.  Radiates to groin.  No GH, no fever, no dysuria.    In hospital:  -AVSS  -WBC - 9 K  -Creat - 0.67  -UA - trace humberto ase, neg nitrite, TNTC RBCs, 0-2 WBCs, no bact    -CT - 5-6 mm right proximal ureteral stone, mod hydro o/w normal    Pain improved with IV narcotics, some persistence  No prior stones but  has had them, so she is familiar  Eating dinner, no nausea/vomiting now    Asked to see    Past medical history:  Past Medical History:   Diagnosis Date    Accessory navicular bone of right foot     Anxiety and depression     Arthritis     GERD (gastroesophageal reflux disease)     Hypertension     HISTORY OF PRIOR TO BANDING    IBS (irritable bowel syndrome)     Limited range of motion (ROM) of shoulder     RIGHT    Right knee pain     Rotator cuff tear     RIGHT    Seasonal allergies     Sleep apnea     USES C-PAP    Vitamin B 12 deficiency     Vitamin D deficiency        Past surgical history:  Past Surgical History:   Procedure Laterality Date    ANKLE FUSION Right 10/16/2020    Procedure: RIGHT  TRIPLE ARTHRODESIS EXCISION OF NAVICULAR ACCESSARY;  Surgeon: Aly Martinez Jr., MD;  [place] [facility] [place] [facility] [place]: [place]opedics;  Laterality: Right;     SECTION  1987   1992    X3    COLONOSCOPY  2015    D & C HYSTEROSCOPY ENDOMETRIAL ABLATION  2014    ESOPHAGEAL DILATATION      X 5    HIATAL HERNIA REPAIR      WITH LAP BAND    INGUINAL HERNIA REPAIR Left     KNEE MINI REVISION Left 2017    LAPAROSCOPIC CHOLECYSTECTOMY  2000    LAPAROSCOPIC GASTRIC BANDING      WITH HIATAL HERNIA REPAIR    PARTIAL KNEE ARTHROPLASTY Left     ID REVJ TOTAL KNEE ARTHRP W/WO ALGRFT 1 COMPONENT Left 2017     Procedure: REVISION LT TOTAL KNEE ;  Surgeon: Bhanu Baxter MD;  Location: [facility] OR;  Service: Orthopedics    TOTAL KNEE ARTHROPLASTY REVISION Left 7/9/2018    Procedure: FEMORAL REVISION COMPONENT LEFT KNEE AND POLYETHELENE;  Surgeon: Bhanu Baxter MD;  Location: [facility] OR;  Service: Orthopedics    TUBAL ABDOMINAL LIGATION  1992    UMBILICAL HERNIA REPAIR  1965    VENTRAL HERNIA REPAIR         Allergies:  Erythromycin, Morphine, Penicillins, Sulfa antibiotics, and Tetracyclines & related    Home medications:  Medications Prior to Admission   Medication Sig Dispense Refill Last Dose    aspirin  MG EC tablet Take 1 tablet by mouth Daily. 30 tablet 0     DULoxetine (CYMBALTA) 60 MG capsule Take 120 mg by mouth Every Morning.       esomeprazole (nexIUM) 40 MG capsule Take 40 mg by mouth Every Morning Before Breakfast.       FLUoxetine (PROZAC) 20 MG capsule Take 1 capsule by mouth Every Morning.       fluticasone (FLONASE) 50 MCG/ACT nasal spray 2 sprays by Each Nare route Every Morning.       lubiprostone (Amitiza) 8 MCG capsule Take 8 mcg by mouth 2 (two) times a day.       ondansetron (ZOFRAN) 4 MG tablet Take 1 tablet by mouth Every 6 (Six) Hours As Needed for Nausea. 20 tablet 0     oxyCODONE-acetaminophen (PERCOCET)  MG per tablet Take 1-2 tablets every 4 hours as needed for post-op pain  1 tablet for 3-6/10 or 2 tablets for 7-10/10 40 tablet 0         Hospital medications:  potassium chloride, 40 mEq, Oral, Q4H  sodium chloride, 10 mL, Intravenous, Q12H      sodium chloride, 100 mL/hr, Last Rate: 100 mL/hr (07/14/24 1539)        senna-docusate sodium **AND** polyethylene glycol **AND** bisacodyl **AND** bisacodyl    Calcium Replacement - Follow Nurse / BPA Driven Protocol    HYDROcodone-acetaminophen    HYDROmorphone    ketorolac    Magnesium Cardiology Dose Replacement - Follow Nurse / BPA Driven Protocol    ondansetron    Phosphorus Replacement - Follow Nurse / BPA Driven Protocol     Potassium Replacement - Follow Nurse / BPA Driven Protocol    [COMPLETED] Insert Peripheral IV **AND** sodium chloride    sodium chloride    sodium chloride    Family history:  Family History   Problem Relation Age of Onset    Malig Hyperthermia Neg Hx        Social history:  Social History     Tobacco Use    Smoking status: Never    Smokeless tobacco: Never   Vaping Use    Vaping status: Never Used   Substance Use Topics    Alcohol use: No    Drug use: No       Review of systems:      Positive for:  nothing  Negative for:  chest pain, cough, sob, o/w neg    Objective:  TMax 24 hours:   Temp (24hrs), Av.8 °F (37.1 °C), Min:97.7 °F (36.5 °C), Max:99.8 °F (37.7 °C)      Vitals Ranges:   Temp:  [97.7 °F (36.5 °C)-99.8 °F (37.7 °C)] 97.7 °F (36.5 °C)  Heart Rate:  [52-81] 52  Resp:  [16-28] 16  BP: (113-149)/(77-92) 130/77    Intake/Output Last 3 shifts:  No intake/output data recorded.     Physical Exam:    General Appearance:    Alert, cooperative, NAD   Back:     Right CVA tenderness   Lungs:     Respirations unlabored, no wheezing    Heart:    RRR, intact peripheral pulses   Abdomen:     Soft, NDNT   :    Pelvic not performed, bladder nondistended and nontender   Neuro/Psych:   Orientation intact, mood/affect pleasant       Results review:   I reviewed the patient's new clinical results.    Data review:  Lab Results (last 24 hours)       Procedure Component Value Units Date/Time    Magnesium [[id number]]  (Normal) Collected: 24 1140    Specimen: Blood Updated: 24 1531     Magnesium 2.0 mg/dL     CBC & Differential [[id number]]  (Abnormal) Collected: 24 1302    Specimen: Blood Updated: 24 1334    Narrative:      The following orders were created for panel order CBC & Differential.  Procedure                               Abnormality         Status                     ---------                               -----------         ------                     CBC Auto Differential[[id number]]         Abnormal            Final result                 Please view results for these tests on the individual orders.    CBC Auto Differential [[id number]]  (Abnormal) Collected: 07/14/24 1302    Specimen: Blood Updated: 07/14/24 1334     WBC 8.66 10*3/mm3      RBC 4.19 10*6/mm3      Hemoglobin 12.2 g/dL      Hematocrit 38.5 %      MCV 91.9 fL      MCH 29.1 pg      MCHC 31.7 g/dL      RDW 13.1 %      RDW-SD 43.8 fl      MPV 11.4 fL      Platelets 158 10*3/mm3      Neutrophil % 82.9 %      Lymphocyte % 8.7 %      Monocyte % 7.4 %      Eosinophil % 0.2 %      Basophil % 0.5 %      Immature Grans % 0.3 %      Neutrophils, Absolute 7.18 10*3/mm3      Lymphocytes, Absolute 0.75 10*3/mm3      Monocytes, Absolute 0.64 10*3/mm3      Eosinophils, Absolute 0.02 10*3/mm3      Basophils, Absolute 0.04 10*3/mm3      Immature Grans, Absolute 0.03 10*3/mm3      nRBC 0.0 /100 WBC     CBC & Differential [[id number]]  (Abnormal) Collected: 07/14/24 1140    Specimen: Blood Updated: 07/14/24 1226    Narrative:      The following orders were created for panel order CBC & Differential.  Procedure                               Abnormality         Status                     ---------                               -----------         ------                     CBC Auto Differential[[id number]]        Abnormal            Final result                 Please view results for these tests on the individual orders.    CBC Auto Differential [[id number]]  (Abnormal) Collected: 07/14/24 1140    Specimen: Blood Updated: 07/14/24 1226     WBC 9.13 10*3/mm3      RBC 4.81 10*6/mm3      Hemoglobin 14.2 g/dL      Hematocrit 43.8 %      MCV 91.1 fL      MCH 29.5 pg      MCHC 32.4 g/dL      RDW 13.1 %      RDW-SD 44.1 fl      MPV 12.1 fL      Platelets 96 10*3/mm3     Comprehensive Metabolic Panel [[id number]]  (Abnormal) Collected: 07/14/24 1140    Specimen: Blood Updated: 07/14/24 1211     Glucose 107 mg/dL      BUN 17 mg/dL      Creatinine 0.67 mg/dL      Sodium  142 mmol/L      Potassium 3.1 mmol/L      Chloride 100 mmol/L      CO2 24.6 mmol/L      Calcium 9.6 mg/dL      Total Protein 7.8 g/dL      Albumin 4.3 g/dL      ALT (SGPT) 12 U/L      AST (SGOT) 16 U/L      Alkaline Phosphatase 84 U/L      Total Bilirubin 0.8 mg/dL      Globulin 3.5 gm/dL      A/G Ratio 1.2 g/dL      BUN/Creatinine Ratio 25.4     Anion Gap 17.4 mmol/L      eGFR 100.2 mL/min/1.73     Narrative:      GFR Normal >60  Chronic Kidney Disease <60  Kidney Failure <15      Urinalysis, Microscopic Only - Urine, Clean Catch [[id number]]  (Abnormal) Collected: 07/14/24 1139    Specimen: Urine, Clean Catch Updated: 07/14/24 1155     RBC, UA Too Numerous to Count /HPF      WBC, UA 0-2 /HPF      Bacteria, UA None Seen /HPF      Squamous Epithelial Cells, UA 0-2 /HPF      Hyaline Casts, UA None Seen /LPF      Methodology Automated Microscopy    Urinalysis With Microscopic If Indicated (No Culture) - Urine, Clean Catch [[id number]]  (Abnormal) Collected: 07/14/24 1139    Specimen: Urine, Clean Catch Updated: 07/14/24 1154     Color, UA Yellow     Appearance, UA Clear     pH, UA 7.5     Specific Gravity, UA 1.024     Glucose, UA Negative     Ketones, UA >=160 mg/dL (4+)     Bilirubin, UA Negative     Blood, UA Moderate (2+)     Protein, UA 30 mg/dL (1+)     Leuk Esterase, UA Trace     Nitrite, UA Negative     Urobilinogen, UA 1.0 E.U./dL             Imaging:  Imaging Results (Last 24 Hours)       Procedure Component Value Units Date/Time    CT Abdomen Pelvis Without Contrast [[id number]] Collected: 07/14/24 1310     Updated: 07/14/24 1318    Narrative:      CT ABDOMEN PELVIS WO CONTRAST-     INDICATIONS: Right flank pain     TECHNIQUE: Radiation dose reduction techniques were utilized, including  automated exposure control and exposure modulation based on body size.  Unenhanced ABDOMEN AND PELVIS CT     COMPARISON: None available     FINDINGS:     Moderate right hydronephrosis is present, with a 5 to 6 mm stone in  the  proximal right ureter. Punctate nonobstructive bilateral nephrolithiasis  is seen. No left hydronephrosis.     Biliary ductal dilatation is present status post cholecystectomy.     Otherwise unremarkable unenhanced appearance of the liver, spleen,  adrenal glands, pancreas, kidneys, bladder.     No bowel obstruction or abnormal bowel thickening is identified. Gastric  band procedure changes noted with patulous distal esophagus. The  appendix does not appear inflamed.     No free intraperitoneal gas or free fluid.     Scattered small mesenteric and para-aortic lymph nodes are seen that are  not significant by size criteria.     Abdominal aorta is not aneurysmal. Aortic and other arterial  calcifications are present.     The lung bases are clear.     Degenerative changes are seen in the spine. No acute fracture is  identified.             Impression:         A 5-6 mm stone in the proximal right ureter with obstructive effect,  moderate right hydronephrosis. Punctate bilateral nonobstructive  nephrolithiasis.     No acute inflammatory process of bowel is identified     This report was finalized on 7/14/2024 1:15 PM by Dr. Maximo Craig M.D on Workstation: [facility]                Assessment:     Right ureteral stone    Plan:     Admit to obs for pain control, MET, hydration  Ideally needs right ESWL and poss stent - will work on arrangements  RBO explained  Strain urine  OR unable to accommodate surgery today    Zeke Worthy MD  07/14/24  18:14 EDT

## 2024-07-14 NOTE — PLAN OF CARE
Goal Outcome Evaluation:                 Patient and spouse in room. They are in agreement with plan of care. She has a strainer for urine. Patient is alert and oriented, ambulatory. VSS

## 2024-07-14 NOTE — H&P
[facility]   HISTORY AND PHYSICAL    Patient Name: Sophie Ace  : 1965  MRN: [id number]  Primary Care Physician:  Kim Barr MD  Date of admission: 2024    Subjective   Subjective     Chief Complaint:   Chief Complaint   Patient presents with    Flank Pain     Right sided     HPI:    Sophie Ace is a 59 y.o. female who presented to the emergency department with complaints of right flank pain.  Symptoms started Friday.  Pain radiates into her abdomen and groin.  Also endorses chills, nausea and vomiting.  She denies fever, chest pain, dyspnea.  No prior history of kidney stones.  Past medical history significant for but not limited to anxiety depression, EVAN, IBS.  She had a CT scan of her abdomen pelvis the emergency department showed a 5 to 6 mm stone in the proximal right ureter with obstructive affect, moderate right hydronephrosis.  Punctate bilateral nonobstructive nephrolithiasis noted as well.  ED provider spoke with urology.  Of note she was found to have a low platelet count on arrival, 96.  A CBC was repeated and it was up to 158.  I reviewed records from [place], and she saw hematologist in  for thrombocytopenia. Her labs normalized without intervention at that time.  She denies any significant bleeding, petechiae.  Takes a daily aspirin due to family history of CAD.  She will be admitted to the observation unit for further management.    Review of Systems   All systems were reviewed and negative except for: Those mentioned in HPI    Personal History     Past Medical History:   Diagnosis Date    Accessory navicular bone of right foot     Anxiety and depression     Arthritis     GERD (gastroesophageal reflux disease)     Hypertension     HISTORY OF PRIOR TO BANDING    IBS (irritable bowel syndrome)     Limited range of motion (ROM) of shoulder     RIGHT    Right knee pain     Rotator cuff tear     RIGHT    Seasonal allergies     Sleep apnea     USES C-PAP    Vitamin B 12  deficiency     Vitamin D deficiency        Past Surgical History:   Procedure Laterality Date    ANKLE FUSION Right 10/16/2020    Procedure: RIGHT  TRIPLE ARTHRODESIS EXCISION OF NAVICULAR ACCESSARY;  Surgeon: Aly Martinez Jr., MD;  Location: [facility] OR [facility];  Service: Orthopedics;  Laterality: Right;     SECTION  1987   1992    X3    COLONOSCOPY  2015    D&C HYSTEROSCOPY ENDOMETRIAL ABLATION  2014    ESOPHAGEAL DILATATION      X 5    HIATAL HERNIA REPAIR  2012    WITH LAP BAND    INGUINAL HERNIA REPAIR Left     KNEE MINI REVISION Left 2017    LAPAROSCOPIC CHOLECYSTECTOMY      LAPAROSCOPIC GASTRIC BANDING      WITH HIATAL HERNIA REPAIR    PARTIAL KNEE ARTHROPLASTY Left     WI REVISE KNEE JOINT REPLACE,1 PART Left 2017    Procedure: REVISION LT TOTAL KNEE ;  Surgeon: Bhanu Baxter MD;  Location: [facility] MAIN OR;  Service: Orthopedics    TOTAL KNEE ARTHROPLASTY REVISION Left 2018    Procedure: FEMORAL REVISION COMPONENT LEFT KNEE AND POLYETHELENE;  Surgeon: Bhanu Baxter MD;  Location: [facility] MAIN OR;  Service: Orthopedics    TUBAL ABDOMINAL LIGATION      UMBILICAL HERNIA REPAIR  1965    VENTRAL HERNIA REPAIR         Family History: family history is not on file. Otherwise pertinent FHx was reviewed and not pertinent to current issue.    Social History:  reports that she has never smoked. She has never used smokeless tobacco. She reports that she does not drink alcohol and does not use drugs.    Home Medications:  DULoxetine, FLUoxetine, aspirin, esomeprazole, fluticasone, lubiprostone, ondansetron, and oxyCODONE-acetaminophen    Allergies:  Allergies   Allergen Reactions    Erythromycin Swelling     Throat swelling    Morphine Itching and Irritability     ALSO ANXIETY, ITCHING, INSOMNIA    Penicillins Swelling     Throat.STATES CAN TAKE KEFLEX    Sulfa Antibiotics Anaphylaxis    Tetracyclines & Related Swelling     throat       Objective   Objective     Vitals:    Temp:  [99.8 °F (37.7 °C)] 99.8 °F (37.7 °C)  Heart Rate:  [56-81] 64  Resp:  [28] 28  BP: (113-149)/(77-92) 113/80  Physical Exam    Constitutional: Awake, alert   Eyes: PERRLA, sclerae anicteric, no conjunctival injection   HENT: NCAT, mucous membranes moist   Neck: Supple, no thyromegaly, no lymphadenopathy, trachea midline   Respiratory: Clear to auscultation bilaterally, nonlabored respirations    Cardiovascular: RRR, no murmurs, rubs, or gallops, palpable pedal pulses bilaterally   Gastrointestinal: Positive bowel sounds, soft, left-sided tenderness, nondistended   Musculoskeletal: No bilateral ankle edema, no clubbing or cyanosis to extremities   Psychiatric: Appropriate affect, cooperative   Neurologic: Oriented x 3, strength symmetric in all extremities, Cranial Nerves grossly intact to confrontation, speech clear   Skin: No rashes     Result Review    Result Review:  I have personally reviewed the results from the time of this admission to 7/14/2024 16:23 EDT and agree with these findings:  [x]  Laboratory list / accordion  []  Microbiology  [x]  Radiology  []  EKG/Telemetry   []  Cardiology/Vascular   []  Pathology  []  Old records  []  Other:  Most notable findings include: Potassium low 3.1, anion gap 17, glucose 107 platelets 96      Assessment & Plan   Assessment / Plan     Brief Patient Summary:  Sophie Ace is a 59 y.o. female who will be admitted the observation unit for further evaluation due to right ureteral stone.  Urology consulted.    Active Hospital Problems:  Active Hospital Problems    Diagnosis     **Kidney stone     Right ureteral stone      Plan:     Right flank pain  Right ureteral stone  -CT abdomen pelvis showed a 5 to 6 mm stone in the proximal right ureter with obstructive defect, moderate right hydronephrosis.  Punctate bilateral nonobstructing nephrolithiasis noted as well.  No acute inflammatory process of the bowel identified.  -Urinalysis without evidence of infection, no  leukocytosis  -Urology consulted  -N.p.o. for now  -Normal saline at 100  -Analgesics and antiemetics as needed  -Repeat CBC and BMP in the morning    Hypokalemia  -Correct per protocol  -Magnesium 2    Thrombocytopenia  -Platelets 96 on initial CBC, repeat was 158  -Was seen by hematology at [place] in 2023 for thrombocytopenia, reviewed office note  -She does take aspirin daily due to a family history of CAD  -Repeat CBC in the morning    Anxiety depression  -Continue home medications    VTE Prophylaxis:  Mechanical VTE prophylaxis orders are present.    CODE STATUS:       Admission Status:  I believe this patient meets observation status.    Electronically signed by LEA Canales, 07/14/24, 4:23 PM EDT.    75 minutes has been spent by [facility] providers in the care of this patient while under observation status    I have worn appropriate PPE during this patient encounter, sanitized my hands both with entering and exiting patient's room.    I have discussed plan of care with patient including advance care plan and/or surrogate decision maker.  Patient advises that their , Hossein will be their primary surrogate decision maker

## 2024-07-14 NOTE — ED NOTES
Nursing report ED to floor  Sophie Ace  59 y.o.  female    HPI :  HPI (Adult)  Stated Reason for Visit: right flank pain  History Obtained From: patient, family  Duration (Days): 3    Chief Complaint  Chief Complaint   Patient presents with    Flank Pain     Right sided       Admitting doctor:   Carlos Beltran MD    Admitting diagnosis:   The encounter diagnosis was Hydronephrosis with urinary obstruction due to ureteral calculus.    Code status:   Current Code Status       Date Active Code Status Order ID Comments User Context       Prior            Allergies:   Erythromycin, Morphine, Penicillins, Sulfa antibiotics, and Tetracyclines & related    Isolation:   No active isolations    Intake and Output  No intake or output data in the 24 hours ending 07/14/24 1529    Weight:       07/14/24  1057   Weight: 65.3 kg (144 lb)       Most recent vitals:   Vitals:    07/14/24 1132 07/14/24 1230 07/14/24 1401 07/14/24 1431   BP:  148/86 131/77 130/80   Pulse: 59 56 64 66   Resp:       Temp:       TempSrc:       SpO2: 96% 94% 94% 95%   Weight:       Height:           Active LDAs/IV Access:   Lines, Drains & Airways       Active LDAs       Name Placement date Placement time Site Days    Peripheral IV 07/14/24 1110 Right Antecubital 07/14/24  1110  Antecubital  less than 1                    Labs (abnormal labs have a star):   Labs Reviewed   COMPREHENSIVE METABOLIC PANEL - Abnormal; Notable for the following components:       Result Value    Glucose 107 (*)     Potassium 3.1 (*)     BUN/Creatinine Ratio 25.4 (*)     Anion Gap 17.4 (*)     All other components within normal limits    Narrative:     GFR Normal >60  Chronic Kidney Disease <60  Kidney Failure <15     URINALYSIS W/ MICROSCOPIC IF INDICATED (NO CULTURE) - Abnormal; Notable for the following components:    Ketones, UA >=160 mg/dL (4+) (*)     Blood, UA Moderate (2+) (*)     Protein, UA 30 mg/dL (1+) (*)     Leuk Esterase, UA Trace (*)     All other components  within normal limits   CBC WITH AUTO DIFFERENTIAL - Abnormal; Notable for the following components:    MPV 12.1 (*)     Platelets 96 (*)     All other components within normal limits   URINALYSIS, MICROSCOPIC ONLY - Abnormal; Notable for the following components:    RBC, UA Too Numerous to Count (*)     All other components within normal limits   CBC WITH AUTO DIFFERENTIAL - Abnormal; Notable for the following components:    Neutrophil % 82.9 (*)     Lymphocyte % 8.7 (*)     Eosinophil % 0.2 (*)     Neutrophils, Absolute 7.18 (*)     All other components within normal limits   MAGNESIUM   CBC AND DIFFERENTIAL    Narrative:     The following orders were created for panel order CBC & Differential.  Procedure                               Abnormality         Status                     ---------                               -----------         ------                     CBC Auto Differential[[id number]]        Abnormal            Final result                 Please view results for these tests on the individual orders.   CBC AND DIFFERENTIAL    Narrative:     The following orders were created for panel order CBC & Differential.  Procedure                               Abnormality         Status                     ---------                               -----------         ------                     CBC Auto Differential[[id number]]        Abnormal            Final result                 Please view results for these tests on the individual orders.       EKG:   No orders to display       Meds given in ED:   Medications   sodium chloride 0.9 % flush 10 mL (has no administration in time range)   ketorolac (TORADOL) injection 15 mg (has no administration in time range)   Potassium Replacement - Follow Nurse / BPA Driven Protocol (has no administration in time range)   Magnesium Cardiology Dose Replacement - Follow Nurse / BPA Driven Protocol (has no administration in time range)   Phosphorus Replacement - Follow Nurse / BPA  Driven Protocol (has no administration in time range)   Calcium Replacement - Follow Nurse / BPA Driven Protocol (has no administration in time range)   sodium chloride 0.9 % flush 10 mL (has no administration in time range)   sodium chloride 0.9 % flush 10 mL (has no administration in time range)   sodium chloride 0.9 % infusion 40 mL (has no administration in time range)   ketorolac (TORADOL) injection 15 mg (has no administration in time range)   sennosides-docusate (PERICOLACE) 8.6-50 MG per tablet 2 tablet (has no administration in time range)     And   polyethylene glycol (MIRALAX) packet 17 g (has no administration in time range)     And   bisacodyl (DULCOLAX) EC tablet 5 mg (has no administration in time range)     And   bisacodyl (DULCOLAX) suppository 10 mg (has no administration in time range)   ondansetron (ZOFRAN) injection 4 mg (has no administration in time range)   HYDROmorphone (DILAUDID) injection 0.5 mg (has no administration in time range)   HYDROcodone-acetaminophen (NORCO) 5-325 MG per tablet 1 tablet (has no administration in time range)   sodium chloride 0.9 % infusion (has no administration in time range)   sodium chloride 0.9 % bolus 1,000 mL (0 mL Intravenous Stopped 7/14/24 1502)   HYDROmorphone (DILAUDID) injection 0.5 mg (0.5 mg Intravenous Given 7/14/24 1141)   ondansetron (ZOFRAN) injection 4 mg (4 mg Intravenous Given 7/14/24 1141)   HYDROmorphone (DILAUDID) injection 0.5 mg (0.5 mg Intravenous Given 7/14/24 1408)       Imaging results:  CT Abdomen Pelvis Without Contrast    Result Date: 7/14/2024   A 5-6 mm stone in the proximal right ureter with obstructive effect, moderate right hydronephrosis. Punctate bilateral nonobstructive nephrolithiasis.  No acute inflammatory process of bowel is identified  This report was finalized on 7/14/2024 1:15 PM by Dr. Maximo Craig M.D on Workstation: [facility]       Ambulatory status:   - assistx1    Social issues:   Social History  "    Socioeconomic History    Marital status:    Tobacco Use    Smoking status: Never    Smokeless tobacco: Never   Substance and Sexual Activity    Alcohol use: No    Drug use: No    Sexual activity: Defer       Peripheral Neurovascular  Peripheral Neurovascular (Adult)  Peripheral Neurovascular WDL: WDL    Neuro Cognitive  Neuro Cognitive (Adult)  Cognitive/Neuro/Behavioral WDL: WDL    Learning  Learning Assessment (Adult)  Learning Readiness and Ability: no barriers identified    Respiratory  Respiratory WDL  Respiratory WDL: .WDL except, all  Rhythm/Pattern, Respiratory: shortness of breath (\"from pain\" per pt)    Abdominal Pain       Pain Assessments  Pain (Adult)  (0-10) Pain Rating: Rest: 10  (0-10) Pain Rating: Activity: 10  Response to Pain Interventions: nonverbal indicators absent/decreased, interventions effective per patient    NIH Stroke Scale       Elise Delatorre RN  07/14/24 15:29 EDT   "

## 2024-07-14 NOTE — NURSING NOTE
Per patient report, she saw urology in ED. No procedure until tomorrow.      She requests food. Dinner given.

## 2024-07-14 NOTE — PROGRESS NOTES
ALMITA BARON Attestation Note     I supervised care provided by the midlevel provider. We have discussed this patient's history, physical exam, and treatment plan. I have reviewed the midlevel provider's note and I agree with the midlevel provider's findings and plan of care.   SHARED VISIT: This visit was performed by BOTH a physician and an APC. The substantive portion of the medical decision making was performed by this attesting physician who made or approved the management plan and takes responsibility for patient management. All studies in the APC note (if performed) were independently interpreted by me.   I have personally had a face to face encounter with the patient.   My personal findings are documented below:      Subjective  Pt is a 59 y.o. female admitted from Emergency Department for evaluation and treatment of left flank pain.    Physical Exam  GENERAL: Alert and in NAD, Vitals reviewed-blood pressure 130/77  HENT: nares patent  EYES: no scleral icterus  CV: regular rhythm, regular rate  RESPIRATORY: normal effort  ABDOMEN: soft, mild left-sided abdominal tenderness  MUSCULOSKELETAL: no deformity  NEURO: Strength sensation and coordination are grossly intact.  Speech and mentation are unremarkable  SKIN: warm, dry    Assessment/Plan  I discussed tx and evaluation of this patient with NP Tere Castro.  I did review ED workup and note patient has 5 mm left UPJ stone.  Will admit to observation unit for pain control, urologic intervention.

## 2024-07-14 NOTE — ED PROVIDER NOTES
EMERGENCY DEPARTMENT ENCOUNTER  Room Number:  103/1  PCP: Kim Barr MD  Independent Historians: Patient and Family      HPI:  Chief Complaint: had concerns including Flank Pain (Right sided).       A complete HPI/ROS/PMH/PSH/SH/FH are unobtainable due to: None    Chronic or social conditions impacting patient care (Social Determinants of Health): None      Context: Sophie Ace is a 59 y.o. female with a medical history of IBS, chronic pain, Lap-Band who presents to the ED c/o acute right flank pain.  Patient states that she has had a constant, sharp pain in her right flank since Friday that does wrap around to her lower abdomen.  She denies previous episodes of pain like this in the past.  She reports chills, nausea and vomiting.  She denies fever, dysuria, chest pain or shortness of breath.  She has no history of previous kidney stones.  Nothing makes this better or worse.  She has been taking her home oxycodone with no relief of her symptoms.      Review of prior external notes (non-ED) -and- Review of prior external test results outside of this encounter:  Medical records reviewed in epic , patient followed by primary care, last annual physical exam was 10/2/23    Prescription drug monitoring program review: [facility] reviewed by Carlos Beltran MD, Toñito Richards MD   N/A    PAST MEDICAL HISTORY  Active Ambulatory Problems     Diagnosis Date Noted    Pain due to internal orthopedic prosthetic devices, implants and grafts, initial encounter 12/12/2017    Recurrent instability of left knee joint 07/09/2018    Difficulty walking 07/11/2018    S/P foot surgery 10/16/2020    Osteoarthritis of right hindfoot 10/17/2020     Resolved Ambulatory Problems     Diagnosis Date Noted    No Resolved Ambulatory Problems     Past Medical History:   Diagnosis Date    Accessory navicular bone of right foot     Anxiety and depression     Arthritis     GERD (gastroesophageal reflux disease)     Hypertension     IBS  (irritable bowel syndrome)     Limited range of motion (ROM) of shoulder     Right knee pain     Rotator cuff tear     Seasonal allergies     Sleep apnea     Vitamin B 12 deficiency     Vitamin D deficiency          PAST SURGICAL HISTORY  Past Surgical History:   Procedure Laterality Date    ANKLE FUSION Right 10/16/2020    Procedure: RIGHT  TRIPLE ARTHRODESIS EXCISION OF NAVICULAR ACCESSARY;  Surgeon: Aly Martinez Jr., MD;  Location: [facility], [place], operated by [facility];  Service: Orthopedics;  Laterality: Right;     SECTION  1987      1992    X3    COLONOSCOPY  2015    D&C HYSTEROSCOPY ENDOMETRIAL ABLATION  2014    ESOPHAGEAL DILATATION      X 5    HIATAL HERNIA REPAIR  2012    WITH LAP BAND    INGUINAL HERNIA REPAIR Left     KNEE MINI REVISION Left 2017    LAPAROSCOPIC CHOLECYSTECTOMY  2000    LAPAROSCOPIC GASTRIC BANDING  2012    WITH HIATAL HERNIA REPAIR    PARTIAL KNEE ARTHROPLASTY Left 2014    [place] REVISE KNEE JOINT REPLACE,1 PART Left 2017    Procedure: REVISION LT TOTAL KNEE ;  Surgeon: Bhanu Baxter MD;  Location: [facility];  Service: Orthopedics    TOTAL KNEE ARTHROPLASTY REVISION Left 2018    Procedure: FEMORAL REVISION COMPONENT LEFT KNEE AND POLYETHELENE;  Surgeon: Bhanu Baxter MD;  Location: [facility];  Service: Orthopedics    TUBAL ABDOMINAL LIGATION  1992    UMBILICAL HERNIA REPAIR  1965    VENTRAL HERNIA REPAIR           FAMILY HISTORY  Family History   Problem Relation Age of Onset    Malig Hyperthermia Neg Hx          SOCIAL HISTORY  Social History     Socioeconomic History    Marital status:    Tobacco Use    Smoking status: Never    Smokeless tobacco: Never   Substance and Sexual Activity    Alcohol use: No    Drug use: No    Sexual activity: Defer         ALLERGIES  Erythromycin, Morphine, Penicillins, Sulfa antibiotics, and Tetracyclines & related      REVIEW OF SYSTEMS  Review of Systems  Included in HPI  All systems reviewed and negative except for those  discussed in HPI.      PHYSICAL EXAM    I have reviewed the triage vital signs and nursing notes.    ED Triage Vitals   Temp Heart Rate Resp BP SpO2   07/14/24 1057 07/14/24 1057 07/14/24 1057 07/14/24 1110 07/14/24 1057   99.8 °F (37.7 °C) 81 28 147/92 97 %       Physical Exam    GENERAL: Well appearing, nontoxic appearing, mildly distressed secondary to pain  HENT: normocephalic, atraumatic  EYES: no scleral icterus, PERRL  CV: regular rhythm, regular rate, no murmur  RESPIRATORY: normal effort, CTAB  ABDOMEN: soft, normal bowel sounds, right lower abdominal tenderness, no rebound, guarding or rigidity  Right CVA and flank tenderness  MUSCULOSKELETAL: no deformity  NEURO: alert, moves all extremities, follows commands, mental status normal/baseline  SKIN: warm, dry, no rash   Psych: Appropriate mood and affect  Nursing notes and vital signs reviewed    Vital signs and nursing notes reviewed.                  LAB RESULTS  Recent Results (from the past 24 hour(s))   Urinalysis With Microscopic If Indicated (No Culture) - Urine, Clean Catch    Collection Time: 07/14/24 11:39 AM    Specimen: Urine, Clean Catch   Result Value Ref Range    Color, UA Yellow Yellow, Straw    Appearance, UA Clear Clear    pH, UA 7.5 5.0 - 8.0    Specific Gravity, UA 1.024 1.005 - 1.030    Glucose, UA Negative Negative    Ketones, UA >=160 mg/dL (4+) (A) Negative    Bilirubin, UA Negative Negative    Blood, UA Moderate (2+) (A) Negative    Protein, UA 30 mg/dL (1+) (A) Negative    Leuk Esterase, UA Trace (A) Negative    Nitrite, UA Negative Negative    Urobilinogen, UA 1.0 E.U./dL 0.2 - 1.0 E.U./dL   Urinalysis, Microscopic Only - Urine, Clean Catch    Collection Time: 07/14/24 11:39 AM    Specimen: Urine, Clean Catch   Result Value Ref Range    RBC, UA Too Numerous to Count (A) None Seen, 0-2 /HPF    WBC, UA 0-2 None Seen, 0-2 /HPF    Bacteria, UA None Seen None Seen /HPF    Squamous Epithelial Cells, UA 0-2 None Seen, 0-2 /HPF    Hyaline  Casts, UA None Seen None Seen /LPF    Methodology Automated Microscopy    Comprehensive Metabolic Panel    Collection Time: 07/14/24 11:40 AM    Specimen: Blood   Result Value Ref Range    Glucose 107 (H) 65 - 99 mg/dL    BUN 17 8 - 23 mg/dL    Creatinine 0.67 0.57 - 1.00 mg/dL    Sodium 142 136 - 145 mmol/L    Potassium 3.1 (L) 3.5 - 5.2 mmol/L    Chloride 100 98 - 107 mmol/L    CO2 24.6 22.0 - 29.0 mmol/L    Calcium 9.6 8.6 - 10.5 mg/dL    Total Protein 7.8 6.0 - 8.5 g/dL    Albumin 4.3 3.5 - 5.2 g/dL    ALT (SGPT) 12 1 - 33 U/L    AST (SGOT) 16 1 - 32 U/L    Alkaline Phosphatase 84 39 - 117 U/L    Total Bilirubin 0.8 0.0 - 1.2 mg/dL    Globulin 3.5 gm/dL    A/G Ratio 1.2 g/dL    BUN/Creatinine Ratio 25.4 (H) 7.0 - 25.0    Anion Gap 17.4 (H) 5.0 - 15.0 mmol/L    eGFR 100.2 >60.0 mL/min/1.73   CBC Auto Differential    Collection Time: 07/14/24 11:40 AM    Specimen: Blood   Result Value Ref Range    WBC 9.13 3.40 - 10.80 10*3/mm3    RBC 4.81 3.77 - 5.28 10*6/mm3    Hemoglobin 14.2 12.0 - 15.9 g/dL    Hematocrit 43.8 34.0 - 46.6 %    MCV 91.1 79.0 - 97.0 fL    MCH 29.5 26.6 - 33.0 pg    MCHC 32.4 31.5 - 35.7 g/dL    RDW 13.1 12.3 - 15.4 %    RDW-SD 44.1 37.0 - 54.0 fl    MPV 12.1 (H) 6.0 - 12.0 fL    Platelets 96 (L) 140 - 450 10*3/mm3   Magnesium    Collection Time: 07/14/24 11:40 AM    Specimen: Blood   Result Value Ref Range    Magnesium 2.0 1.6 - 2.4 mg/dL   CBC Auto Differential    Collection Time: 07/14/24  1:02 PM    Specimen: Blood   Result Value Ref Range    WBC 8.66 3.40 - 10.80 10*3/mm3    RBC 4.19 3.77 - 5.28 10*6/mm3    Hemoglobin 12.2 12.0 - 15.9 g/dL    Hematocrit 38.5 34.0 - 46.6 %    MCV 91.9 79.0 - 97.0 fL    MCH 29.1 26.6 - 33.0 pg    MCHC 31.7 31.5 - 35.7 g/dL    RDW 13.1 12.3 - 15.4 %    RDW-SD 43.8 37.0 - 54.0 fl    MPV 11.4 6.0 - 12.0 fL    Platelets 158 140 - 450 10*3/mm3    Neutrophil % 82.9 (H) 42.7 - 76.0 %    Lymphocyte % 8.7 (L) 19.6 - 45.3 %    Monocyte % 7.4 5.0 - 12.0 %    Eosinophil %  0.2 (L) 0.3 - 6.2 %    Basophil % 0.5 0.0 - 1.5 %    Immature Grans % 0.3 0.0 - 0.5 %    Neutrophils, Absolute 7.18 (H) 1.70 - 7.00 10*3/mm3    Lymphocytes, Absolute 0.75 0.70 - 3.10 10*3/mm3    Monocytes, Absolute 0.64 0.10 - 0.90 10*3/mm3    Eosinophils, Absolute 0.02 0.00 - 0.40 10*3/mm3    Basophils, Absolute 0.04 0.00 - 0.20 10*3/mm3    Immature Grans, Absolute 0.03 0.00 - 0.05 10*3/mm3    nRBC 0.0 0.0 - 0.2 /100 WBC         RADIOLOGY  CT Abdomen Pelvis Without Contrast    Result Date: 7/14/2024  CT ABDOMEN PELVIS WO CONTRAST-  INDICATIONS: Right flank pain  TECHNIQUE: Radiation dose reduction techniques were utilized, including automated exposure control and exposure modulation based on body size. Unenhanced ABDOMEN AND PELVIS CT  COMPARISON: None available  FINDINGS:  Moderate right hydronephrosis is present, with a 5 to 6 mm stone in the proximal right ureter. Punctate nonobstructive bilateral nephrolithiasis is seen. No left hydronephrosis.  Biliary ductal dilatation is present status post cholecystectomy.  Otherwise unremarkable unenhanced appearance of the liver, spleen, adrenal glands, pancreas, kidneys, bladder.  No bowel obstruction or abnormal bowel thickening is identified. Gastric band procedure changes noted with patulous distal esophagus. The appendix does not appear inflamed.  No free intraperitoneal gas or free fluid.  Scattered small mesenteric and para-aortic lymph nodes are seen that are not significant by size criteria.  Abdominal aorta is not aneurysmal. Aortic and other arterial calcifications are present.  The lung bases are clear.  Degenerative changes are seen in the spine. No acute fracture is identified.         A 5-6 mm stone in the proximal right ureter with obstructive effect, moderate right hydronephrosis. Punctate bilateral nonobstructive nephrolithiasis.  No acute inflammatory process of bowel is identified  This report was finalized on 7/14/2024 1:15 PM by Dr. Maximo MAC  KATHI Craig on Workstation: [id number]         MEDICATIONS GIVEN IN ER  Medications   sodium chloride 0.9 % flush 10 mL (has no administration in time range)   Potassium Replacement - Follow Nurse / BPA Driven Protocol (has no administration in time range)   Magnesium Cardiology Dose Replacement - Follow Nurse / BPA Driven Protocol (has no administration in time range)   Phosphorus Replacement - Follow Nurse / BPA Driven Protocol (has no administration in time range)   Calcium Replacement - Follow Nurse / BPA Driven Protocol (has no administration in time range)   sodium chloride 0.9 % flush 10 mL (has no administration in time range)   sodium chloride 0.9 % flush 10 mL (has no administration in time range)   sodium chloride 0.9 % infusion 40 mL (has no administration in time range)   ketorolac (TORADOL) injection 15 mg (has no administration in time range)   sennosides-docusate (PERICOLACE) 8.6-50 MG per tablet 2 tablet (has no administration in time range)     And   polyethylene glycol (MIRALAX) packet 17 g (has no administration in time range)     And   bisacodyl (DULCOLAX) EC tablet 5 mg (has no administration in time range)     And   bisacodyl (DULCOLAX) suppository 10 mg (has no administration in time range)   ondansetron (ZOFRAN) injection 4 mg (has no administration in time range)   HYDROmorphone (DILAUDID) injection 0.5 mg (has no administration in time range)   HYDROcodone-acetaminophen (NORCO) 5-325 MG per tablet 1 tablet (has no administration in time range)   sodium chloride 0.9 % infusion (100 mL/hr Intravenous New Bag 7/14/24 1539)   sodium chloride 0.9 % bolus 1,000 mL (0 mL Intravenous Stopped 7/14/24 1502)   HYDROmorphone (DILAUDID) injection 0.5 mg (0.5 mg Intravenous Given 7/14/24 1141)   ondansetron (ZOFRAN) injection 4 mg (4 mg Intravenous Given 7/14/24 1141)   HYDROmorphone (DILAUDID) injection 0.5 mg (0.5 mg Intravenous Given 7/14/24 1408)   ketorolac (TORADOL) injection 15 mg (15 mg  Intravenous Given 7/14/24 8846)         ORDERS PLACED DURING THIS VISIT:  Orders Placed This Encounter   Procedures    CT Abdomen Pelvis Without Contrast    Comprehensive Metabolic Panel    Urinalysis With Microscopic If Indicated (No Culture) - Urine, Clean Catch    CBC Auto Differential    Urinalysis, Microscopic Only - Urine, Clean Catch    CBC Auto Differential    Magnesium    Basic Metabolic Panel    NPO Diet NPO Type: Sips with Meds    Vital Signs    Intake & Output    Weigh Patient    Oral Care    Saline Lock & Maintain IV Access    Urology (on-call MD unless specified)    Inpatient Urology Consult    Insert Peripheral IV    Insert Peripheral IV    Initiate ED Observation Status    CBC & Differential    CBC & Differential    CBC & Differential         DIFFERENTIAL DIAGNOSIS:  Differential diagnosis for abdominal pain include but are not limited to the following:    -Hepatobiliary pathology such as cholecystitis, cholangitis, and symptomatic cholelithiasis  -GERD  -Pancreatitis  -Small or large bowel obstruction  -Appendicitis  -Diverticulitis  -UTI including pyelonephritis  -Ureteral stone  -Zoster  -Colitis, including infectious and ischemic  -Atypical ACS            OUTPATIENT MEDICATION MANAGEMENT:  Current Facility-Administered Medications Ordered in Epic   Medication Dose Route Frequency Provider Last Rate Last Admin    sennosides-docusate (PERICOLACE) 8.6-50 MG per tablet 2 tablet  2 tablet Oral BID PRN Blevens, Tere C, APRN        And    polyethylene glycol (MIRALAX) packet 17 g  17 g Oral Daily PRN Blevens, Tere C, APRN        And    bisacodyl (DULCOLAX) EC tablet 5 mg  5 mg Oral Daily PRN Blevens, Tere C, APRN        And    bisacodyl (DULCOLAX) suppository 10 mg  10 mg Rectal Daily PRN Blevens, Tere C, APRN        Calcium Replacement - Follow Nurse / BPA Driven Protocol   Does not apply PRN Blevens, Tere C, APRN        HYDROcodone-acetaminophen (NORCO) 5-325 MG per tablet 1 tablet  1 tablet Oral Q6H  PRN Carlos Beltran MD        HYDROmorphone (DILAUDID) injection 0.5 mg  0.5 mg Intravenous Q4H PRN Carlos Beltran MD        ketorolac (TORADOL) injection 15 mg  15 mg Intravenous Q6H PRN Blevens, Tere C, APRN        Magnesium Cardiology Dose Replacement - Follow Nurse / BPA Driven Protocol   Does not apply PRN Blevens, Tere C, APRN        ondansetron (ZOFRAN) injection 4 mg  4 mg Intravenous Q6H PRN Blevens, Tere C, APRN        Phosphorus Replacement - Follow Nurse / BPA Driven Protocol   Does not apply PRN Blevens, Tere C, APRN        Potassium Replacement - Follow Nurse / BPA Driven Protocol   Does not apply PRN Blevens, Tere C, APRN        sodium chloride 0.9 % flush 10 mL  10 mL Intravenous PRN Stettengallo Jennifer R, APRN        sodium chloride 0.9 % flush 10 mL  10 mL Intravenous Q12H Blevens, Tere C, APRN        sodium chloride 0.9 % flush 10 mL  10 mL Intravenous PRN Blevens, Tere C, APRN        sodium chloride 0.9 % infusion 40 mL  40 mL Intravenous PRN Blevens, Tere C, APRN        sodium chloride 0.9 % infusion  100 mL/hr Intravenous Continuous Blevens, Tere C, APRN 100 mL/hr at 07/14/24 1539 100 mL/hr at 07/14/24 1539     No current [facility]-ordered outpatient medications on file.         PROCEDURES  Procedures      PROGRESS, DATA ANALYSIS, CONSULTS, AND MEDICAL DECISION MAKING  All labs have been independently interpreted by me.  All radiology studies have been reviewed by me. All EKG's have been independently viewed and interpreted by me.  Discussion below represents my analysis of pertinent findings related to patient's condition, differential diagnosis, treatment plan and final disposition        Clinical Scores:                  ED Course as of 07/14/24 1622   Sun Jul 14, 2024   1130 59-year-old female who presents with right flank pain that radiates around to her right lower abdomen.  No history of kidney stones.  Plan for labs, urinalysis, CT of the abdomen and pelvis.  IV fluids and pain control  ordered also. [MS]   1203 Nitrite, UA: Negative [MS]   1203 RBC, UA(!): Too Numerous to Count [MS]   1244 WBC: 9.13 [MS]   1244 Hemoglobin: 14.2 [MS]   1244 Hematocrit: 43.8 [MS]   1409 Reviewed pt's history and workup with Dr. Richards.  After a bedside evaluation, he agrees with the plan of care.     [MS]   1507 Discussed with Dr Worthy, on call urologist, regarding patient's relevant history, exam, workup and ED findings/concerns.  H3 agrees to see patient in consult.  He is okay with patient having Toradol.     [MS]   1512 Consult Note    Discussed care with Tere Handy  Reviewed patient's history, exam, results and need for admission secondary to R ureteral stone  Dr. Beltran accepts the patient to be admitted to observation unit bed.     [MS]      ED Course User Index  [MS] Jennifer Flores, APRN             AS OF 16:22 EDT VITALS:    BP - 113/80  HR - 64  TEMP - 99.8 °F (37.7 °C) (Tympanic)  O2 SATS - 97%    COMPLEXITY OF CARE  The patient requires admission.      DIAGNOSIS  Final diagnoses:   Hydronephrosis with urinary obstruction due to ureteral calculus         DISPOSITION  ED Disposition       ED Disposition   Decision to Admit    Condition   --    Comment   --                Please note that portions of this document were completed with a voice recognition program.    Note Disclaimer: At [facility], we believe that sharing information builds trust and better relationships. You are receiving this note because you recently visited [facility]. It is possible you will see health information before a provider has talked with you about it. This kind of information can be easy to misunderstand. To help you fully understand what it means for your health, we urge you to discuss this note with your provider.         Jennifer Flores, APRN  07/14/24 2651

## 2024-07-15 VITALS
DIASTOLIC BLOOD PRESSURE: 77 MMHG | RESPIRATION RATE: 20 BRPM | SYSTOLIC BLOOD PRESSURE: 128 MMHG | BODY MASS INDEX: 24.07 KG/M2 | OXYGEN SATURATION: 100 % | WEIGHT: 141 LBS | HEIGHT: 64 IN | TEMPERATURE: 98.1 F | HEART RATE: 62 BPM

## 2024-07-15 LAB
ANION GAP SERPL CALCULATED.3IONS-SCNC: 10 MMOL/L (ref 5–15)
BUN SERPL-MCNC: 16 MG/DL (ref 8–23)
BUN/CREAT SERPL: 24.2 (ref 7–25)
CALCIUM SPEC-SCNC: 8.3 MG/DL (ref 8.6–10.5)
CHLORIDE SERPL-SCNC: 104 MMOL/L (ref 98–107)
CLUMPED PLATELETS: PRESENT
CO2 SERPL-SCNC: 27 MMOL/L (ref 22–29)
CREAT SERPL-MCNC: 0.66 MG/DL (ref 0.57–1)
DEPRECATED RDW RBC AUTO: 42.5 FL (ref 37–54)
EGFRCR SERPLBLD CKD-EPI 2021: 100.6 ML/MIN/1.73
EOSINOPHIL # BLD MANUAL: 0.06 10*3/MM3 (ref 0–0.4)
EOSINOPHIL NFR BLD MANUAL: 1 % (ref 0.3–6.2)
ERYTHROCYTE [DISTWIDTH] IN BLOOD BY AUTOMATED COUNT: 13.1 % (ref 12.3–15.4)
GLUCOSE SERPL-MCNC: 105 MG/DL (ref 65–99)
HCT VFR BLD AUTO: 33.3 % (ref 34–46.6)
HGB BLD-MCNC: 11.1 G/DL (ref 12–15.9)
LYMPHOCYTES # BLD MANUAL: 2.16 10*3/MM3 (ref 0.7–3.1)
LYMPHOCYTES NFR BLD MANUAL: 8 % (ref 5–12)
MCH RBC QN AUTO: 29.9 PG (ref 26.6–33)
MCHC RBC AUTO-ENTMCNC: 33.3 G/DL (ref 31.5–35.7)
MCV RBC AUTO: 89.8 FL (ref 79–97)
MONOCYTES # BLD: 0.45 10*3/MM3 (ref 0.1–0.9)
NEUTROPHILS # BLD AUTO: 3.01 10*3/MM3 (ref 1.7–7)
NEUTROPHILS NFR BLD MANUAL: 53 % (ref 42.7–76)
NRBC BLD AUTO-RTO: 0 /100 WBC (ref 0–0.2)
PLATELET # BLD AUTO: 84 10*3/MM3 (ref 140–450)
PMV BLD AUTO: 11.9 FL (ref 6–12)
POTASSIUM SERPL-SCNC: 3.5 MMOL/L (ref 3.5–5.2)
POTASSIUM SERPL-SCNC: 3.5 MMOL/L (ref 3.5–5.2)
RBC # BLD AUTO: 3.71 10*6/MM3 (ref 3.77–5.28)
RBC MORPH BLD: NORMAL
SMALL PLATELETS BLD QL SMEAR: ADEQUATE
SODIUM SERPL-SCNC: 141 MMOL/L (ref 136–145)
VARIANT LYMPHS NFR BLD MANUAL: 38 % (ref 19.6–45.3)
WBC MORPH BLD: NORMAL
WBC NRBC COR # BLD AUTO: 5.68 10*3/MM3 (ref 3.4–10.8)

## 2024-07-15 PROCEDURE — 96365 THER/PROPH/DIAG IV INF INIT: CPT

## 2024-07-15 PROCEDURE — G0378 HOSPITAL OBSERVATION PER HR: HCPCS

## 2024-07-15 PROCEDURE — 85027 COMPLETE CBC AUTOMATED: CPT | Performed by: NURSE PRACTITIONER

## 2024-07-15 PROCEDURE — 25010000002 SODIUM CHLORIDE 0.9 % WITH KCL 40 MEQ/L 40-0.9 MEQ/L-% SOLUTION: Performed by: NURSE PRACTITIONER

## 2024-07-15 PROCEDURE — 25010000002 HYDROMORPHONE PER 4 MG: Performed by: EMERGENCY MEDICINE

## 2024-07-15 PROCEDURE — 84132 ASSAY OF SERUM POTASSIUM: CPT | Performed by: NURSE PRACTITIONER

## 2024-07-15 PROCEDURE — 25010000002 ONDANSETRON PER 1 MG: Performed by: NURSE PRACTITIONER

## 2024-07-15 PROCEDURE — 85007 BL SMEAR W/DIFF WBC COUNT: CPT | Performed by: NURSE PRACTITIONER

## 2024-07-15 PROCEDURE — 96361 HYDRATE IV INFUSION ADD-ON: CPT

## 2024-07-15 PROCEDURE — 80048 BASIC METABOLIC PNL TOTAL CA: CPT | Performed by: NURSE PRACTITIONER

## 2024-07-15 PROCEDURE — 96376 TX/PRO/DX INJ SAME DRUG ADON: CPT

## 2024-07-15 PROCEDURE — 25010000002 POTASSIUM CHLORIDE 10 MEQ/100ML SOLUTION: Performed by: NURSE PRACTITIONER

## 2024-07-15 RX ADMIN — HYDROMORPHONE HYDROCHLORIDE 0.5 MG: 1 INJECTION, SOLUTION INTRAMUSCULAR; INTRAVENOUS; SUBCUTANEOUS at 12:52

## 2024-07-15 RX ADMIN — FLUTICASONE PROPIONATE 2 SPRAY: 50 SPRAY, METERED NASAL at 08:09

## 2024-07-15 RX ADMIN — POTASSIUM CHLORIDE 10 MEQ: 7.46 INJECTION, SOLUTION INTRAVENOUS at 02:12

## 2024-07-15 RX ADMIN — HYDROMORPHONE HYDROCHLORIDE 0.5 MG: 1 INJECTION, SOLUTION INTRAMUSCULAR; INTRAVENOUS; SUBCUTANEOUS at 08:09

## 2024-07-15 RX ADMIN — Medication 10 ML: at 08:10

## 2024-07-15 RX ADMIN — POTASSIUM CHLORIDE 10 MEQ: 7.46 INJECTION, SOLUTION INTRAVENOUS at 00:15

## 2024-07-15 RX ADMIN — PANTOPRAZOLE SODIUM 40 MG: 40 TABLET, DELAYED RELEASE ORAL at 04:18

## 2024-07-15 RX ADMIN — POTASSIUM CHLORIDE AND SODIUM CHLORIDE 100 ML/HR: 900; 300 INJECTION, SOLUTION INTRAVENOUS at 03:41

## 2024-07-15 RX ADMIN — LUBIPROSTONE 8 MCG: 8 CAPSULE, GELATIN COATED ORAL at 08:09

## 2024-07-15 RX ADMIN — ONDANSETRON 4 MG: 2 INJECTION INTRAMUSCULAR; INTRAVENOUS at 03:41

## 2024-07-15 RX ADMIN — DULOXETINE HYDROCHLORIDE 120 MG: 60 CAPSULE, DELAYED RELEASE ORAL at 08:09

## 2024-07-15 RX ADMIN — HYDROMORPHONE HYDROCHLORIDE 0.5 MG: 1 INJECTION, SOLUTION INTRAMUSCULAR; INTRAVENOUS; SUBCUTANEOUS at 00:15

## 2024-07-15 NOTE — PROGRESS NOTES
"  [facility] DAILY PROGRESS NOTE      Name: Sophie Ace  Age: 59 y.o.  Sex: female  :  1965  MRN: [id number]    Date: 7/15/2024             Subjective:  Interval History:   Afebrile  Still having pain       Objective:    Scheduled Meds:DULoxetine, 120 mg, Oral, Daily  fluticasone, 2 spray, Each Nare, Daily  lubiprostone, 8 mcg, Oral, BID With Meals  pantoprazole, 40 mg, Oral, Q AM  sodium chloride, 10 mL, Intravenous, Q12H      Continuous Infusions:sodium chloride 0.9 % with KCl 40 mEq/L, 100 mL/hr, Last Rate: 100 mL/hr (07/15/24 0341)      PRN Meds:  acetaminophen    senna-docusate sodium **AND** polyethylene glycol **AND** bisacodyl **AND** bisacodyl    Calcium Replacement - Follow Nurse / BPA Driven Protocol    HYDROcodone-acetaminophen    HYDROmorphone    ketorolac    Magnesium Cardiology Dose Replacement - Follow Nurse / BPA Driven Protocol    ondansetron    Phosphorus Replacement - Follow Nurse / BPA Driven Protocol    Potassium Replacement - Follow Nurse / BPA Driven Protocol    [COMPLETED] Insert Peripheral IV **AND** sodium chloride    sodium chloride    sodium chloride    Vital signs in last 24 hours:  Temp:  [97.7 °F (36.5 °C)-100 °F (37.8 °C)] (P) 98.4 °F (36.9 °C)  Heart Rate:  [51-81] (P) 56  Resp:  [16-28] (P) 20  BP: ()/(58-92) (P) 111/69    Intake/Output:    Intake/Output Summary (Last 24 hours) at 7/15/2024 0728  Last data filed at 7/15/2024 0341  Gross per 24 hour   Intake 491.7 ml   Output 2 ml   Net 489.7 ml       Exam:  BP (P) 111/69 (BP Location: Right arm, Patient Position: Lying)   Pulse (P) 56   Temp (P) 98.4 °F (36.9 °C) (Oral)   Resp (P) 20   Ht 161.5 cm (63.6\")   Wt 64 kg (141 lb)   SpO2 (P) 100%   BMI 24.51 kg/m²     No acute distress   Lying on left side       Data Review:  All labs (24hrs):   Recent Results (from the past 24 hour(s))   Urinalysis With Microscopic If Indicated (No Culture) - Urine, Clean Catch    Collection Time: 24 11:39 AM    Specimen: " Urine, Clean Catch   Result Value Ref Range    Color, UA Yellow Yellow, Straw    Appearance, UA Clear Clear    pH, UA 7.5 5.0 - 8.0    Specific Gravity, UA 1.024 1.005 - 1.030    Glucose, UA Negative Negative    Ketones, UA >=160 mg/dL (4+) (A) Negative    Bilirubin, UA Negative Negative    Blood, UA Moderate (2+) (A) Negative    Protein, UA 30 mg/dL (1+) (A) Negative    Leuk Esterase, UA Trace (A) Negative    Nitrite, UA Negative Negative    Urobilinogen, UA 1.0 E.U./dL 0.2 - 1.0 E.U./dL   Urinalysis, Microscopic Only - Urine, Clean Catch    Collection Time: 07/14/24 11:39 AM    Specimen: Urine, Clean Catch   Result Value Ref Range    RBC, UA Too Numerous to Count (A) None Seen, 0-2 /HPF    WBC, UA 0-2 None Seen, 0-2 /HPF    Bacteria, UA None Seen None Seen /HPF    Squamous Epithelial Cells, UA 0-2 None Seen, 0-2 /HPF    Hyaline Casts, UA None Seen None Seen /LPF    Methodology Automated Microscopy    Comprehensive Metabolic Panel    Collection Time: 07/14/24 11:40 AM    Specimen: Blood   Result Value Ref Range    Glucose 107 (H) 65 - 99 mg/dL    BUN 17 8 - 23 mg/dL    Creatinine 0.67 0.57 - 1.00 mg/dL    Sodium 142 136 - 145 mmol/L    Potassium 3.1 (L) 3.5 - 5.2 mmol/L    Chloride 100 98 - 107 mmol/L    CO2 24.6 22.0 - 29.0 mmol/L    Calcium 9.6 8.6 - 10.5 mg/dL    Total Protein 7.8 6.0 - 8.5 g/dL    Albumin 4.3 3.5 - 5.2 g/dL    ALT (SGPT) 12 1 - 33 U/L    AST (SGOT) 16 1 - 32 U/L    Alkaline Phosphatase 84 39 - 117 U/L    Total Bilirubin 0.8 0.0 - 1.2 mg/dL    Globulin 3.5 gm/dL    A/G Ratio 1.2 g/dL    BUN/Creatinine Ratio 25.4 (H) 7.0 - 25.0    Anion Gap 17.4 (H) 5.0 - 15.0 mmol/L    eGFR 100.2 >60.0 mL/min/1.73   CBC Auto Differential    Collection Time: 07/14/24 11:40 AM    Specimen: Blood   Result Value Ref Range    WBC 9.13 3.40 - 10.80 10*3/mm3    RBC 4.81 3.77 - 5.28 10*6/mm3    Hemoglobin 14.2 12.0 - 15.9 g/dL    Hematocrit 43.8 34.0 - 46.6 %    MCV 91.1 79.0 - 97.0 fL    MCH 29.5 26.6 - 33.0 pg    MCHC  32.4 31.5 - 35.7 g/dL    RDW 13.1 12.3 - 15.4 %    RDW-SD 44.1 37.0 - 54.0 fl    MPV 12.1 (H) 6.0 - 12.0 fL    Platelets 96 (L) 140 - 450 10*3/mm3   Magnesium    Collection Time: 07/14/24 11:40 AM    Specimen: Blood   Result Value Ref Range    Magnesium 2.0 1.6 - 2.4 mg/dL   CBC Auto Differential    Collection Time: 07/14/24  1:02 PM    Specimen: Blood   Result Value Ref Range    WBC 8.66 3.40 - 10.80 10*3/mm3    RBC 4.19 3.77 - 5.28 10*6/mm3    Hemoglobin 12.2 12.0 - 15.9 g/dL    Hematocrit 38.5 34.0 - 46.6 %    MCV 91.9 79.0 - 97.0 fL    MCH 29.1 26.6 - 33.0 pg    MCHC 31.7 31.5 - 35.7 g/dL    RDW 13.1 12.3 - 15.4 %    RDW-SD 43.8 37.0 - 54.0 fl    MPV 11.4 6.0 - 12.0 fL    Platelets 158 140 - 450 10*3/mm3    Neutrophil % 82.9 (H) 42.7 - 76.0 %    Lymphocyte % 8.7 (L) 19.6 - 45.3 %    Monocyte % 7.4 5.0 - 12.0 %    Eosinophil % 0.2 (L) 0.3 - 6.2 %    Basophil % 0.5 0.0 - 1.5 %    Immature Grans % 0.3 0.0 - 0.5 %    Neutrophils, Absolute 7.18 (H) 1.70 - 7.00 10*3/mm3    Lymphocytes, Absolute 0.75 0.70 - 3.10 10*3/mm3    Monocytes, Absolute 0.64 0.10 - 0.90 10*3/mm3    Eosinophils, Absolute 0.02 0.00 - 0.40 10*3/mm3    Basophils, Absolute 0.04 0.00 - 0.20 10*3/mm3    Immature Grans, Absolute 0.03 0.00 - 0.05 10*3/mm3    nRBC 0.0 0.0 - 0.2 /100 WBC   Potassium    Collection Time: 07/15/24  3:55 AM    Specimen: Arm, Left; Blood   Result Value Ref Range    Potassium 3.5 3.5 - 5.2 mmol/L   Basic Metabolic Panel    Collection Time: 07/15/24  3:55 AM    Specimen: Arm, Left; Blood   Result Value Ref Range    Glucose 105 (H) 65 - 99 mg/dL    BUN 16 8 - 23 mg/dL    Creatinine 0.66 0.57 - 1.00 mg/dL    Sodium 141 136 - 145 mmol/L    Potassium 3.5 3.5 - 5.2 mmol/L    Chloride 104 98 - 107 mmol/L    CO2 27.0 22.0 - 29.0 mmol/L    Calcium 8.3 (L) 8.6 - 10.5 mg/dL    BUN/Creatinine Ratio 24.2 7.0 - 25.0    Anion Gap 10.0 5.0 - 15.0 mmol/L    eGFR 100.6 >60.0 mL/min/1.73   CBC Auto Differential    Collection Time: 07/15/24  3:55  AM    Specimen: Arm, Left; Blood   Result Value Ref Range    WBC 5.68 3.40 - 10.80 10*3/mm3    RBC 3.71 (L) 3.77 - 5.28 10*6/mm3    Hemoglobin 11.1 (L) 12.0 - 15.9 g/dL    Hematocrit 33.3 (L) 34.0 - 46.6 %    MCV 89.8 79.0 - 97.0 fL    MCH 29.9 26.6 - 33.0 pg    MCHC 33.3 31.5 - 35.7 g/dL    RDW 13.1 12.3 - 15.4 %    RDW-SD 42.5 37.0 - 54.0 fl    MPV 11.9 6.0 - 12.0 fL    Platelets 84 (L) 140 - 450 10*3/mm3    nRBC 0.0 0.0 - 0.2 /100 WBC   Manual Differential    Collection Time: 07/15/24  3:55 AM    Specimen: Arm, Left; Blood   Result Value Ref Range    Neutrophil % 53.0 42.7 - 76.0 %    Lymphocyte % 38.0 19.6 - 45.3 %    Monocyte % 8.0 5.0 - 12.0 %    Eosinophil % 1.0 0.3 - 6.2 %    Neutrophils Absolute 3.01 1.70 - 7.00 10*3/mm3    Lymphocytes Absolute 2.16 0.70 - 3.10 10*3/mm3    Monocytes Absolute 0.45 0.10 - 0.90 10*3/mm3    Eosinophils Absolute 0.06 0.00 - 0.40 10*3/mm3    RBC Morphology Normal Normal    WBC Morphology Normal Normal    Platelet Estimate Adequate Normal    Clumped Platelets Present None Seen          Assessment:    Kidney stone    Right ureteral stone    Right ureteral calculus    We discussed options including stent placement, add on Ureteroscopy (timing uncertain), add on ESWL/Stent (other hospital, timing more reliable)    Plan:    - Initiating plans to have her put on litho schedule   - This will likely require Discharge home in the interim.  - Maintain NPO  - Plan on Discharge; Urology office will arrange procedure.    Pilo Pappas MD  7/15/2024  07:28 EDT

## 2024-07-15 NOTE — PLAN OF CARE
Goal Outcome Evaluation:  Plan of Care Reviewed With: patient   Patient remains on obs unit for pain management and hydration for right ureter stone of 5-6 mm's with hydronephrosis. Patient has reported 2 episodes of vomiting, as needed antiemetic administered with relief noted. Pain controlled with as needed IV analgesics. Minimal pain noted at this time, IVF's changed for normal saline to Potassium 40 with normal saline, patient also received 2 IVPB's of potassium for k of 3.1. Pt was unable to tolerate oral potassium. Afebrile with vitals signs stable. Continues to use BSC independently. Urology following, plan of Eswl will be discussed with patient. Morning labs pending. Plan of care ongoing.   Progress: no change

## 2024-07-15 NOTE — PROGRESS NOTES
MD ATTESTATION NOTE    The JARED and I have discussed this patient's history, physical exam, and treatment plan.  I have reviewed the documentation and personally had a face to face interaction with the patient. I affirm the documentation and agree with the treatment and plan.  The attached note describes my personal findings.      I provided a substantive portion of the care of the patient.  I personally performed the physical exam in its entirety, and below are my findings.       Brief HPI: Patient admitted from ER with 5 mm left UPJ stone.  Patient with Left Flank Pain.    PHYSICAL EXAM  ED Triage Vitals   Temp Heart Rate Resp BP SpO2   07/14/24 1057 07/14/24 1057 07/14/24 1057 07/14/24 1110 07/14/24 1057   99.8 °F (37.7 °C) 81 28 147/92 97 %      Temp src Heart Rate Source Patient Position BP Location FiO2 (%)   07/14/24 1057 07/14/24 1057 07/14/24 1643 07/14/24 1643 --   Tympanic Monitor Lying Right arm          GENERAL: no acute distress  HENT: nares patent  EYES: no scleral icterus  CV: regular rhythm, normal rate  RESPIRATORY: normal effort  ABDOMEN: soft  MUSCULOSKELETAL: no deformity  NEURO: alert, moves all extremities, follows commands  PSYCH:  calm, cooperative  SKIN: warm, dry    Vital signs and nursing notes reviewed.        Plan: Urology consult.  Patient placed on the lithotripsy schedule.  Patient is a scheduled for approximately 2 PM today.  Plan to discharge home in the interim.  Patient will maintain NPO.

## (undated) DEVICE — KT DRN EVAC WND PVC PCH WTROC RND 10F400

## (undated) DEVICE — BNDG ELAS CO-FLEX SLF ADHR 6IN 5YD LF STRL

## (undated) DEVICE — BIT DRL DYNANAIL FEN 2.5MM

## (undated) DEVICE — PAD,ABDOMINAL,8"X10",ST,LF: Brand: MEDLINE

## (undated) DEVICE — KT INST PROC DYNACLIP UNIV DISP STRL

## (undated) DEVICE — NDL SPINE 20G 3 1/2 YEL STRL 1P/U

## (undated) DEVICE — SPNG GZ WOVN 4X4IN 12PLY 10/BX STRL

## (undated) DEVICE — STCKNT IMPERV 12IN STRL

## (undated) DEVICE — GLV SURG PREMIERPRO ORTHO LTX PF SZ8 BRN

## (undated) DEVICE — ANTIBACTERIAL UNDYED BRAIDED (POLYGLACTIN 910), SYNTHETIC ABSORBABLE SUTURE: Brand: COATED VICRYL

## (undated) DEVICE — IMMOB KN 3PNL NLY/FIBR UNIV 12IN

## (undated) DEVICE — GLV SURG TRIUMPH CLASSIC PF LTX 8 STRL

## (undated) DEVICE — BNDG ELAS ELITE V/CLOSE 6IN 5YD LF STRL

## (undated) DEVICE — PK KN TOTL 40

## (undated) DEVICE — HOOK LOCK LATEX FREE ELASTIC BANDAGE D/L 6INX10YD

## (undated) DEVICE — PENCL E/S ULTRAVAC TELESCP NOSE HOLSTR 10FT

## (undated) DEVICE — SUT VIC 2/0 CT1 27IN J259H

## (undated) DEVICE — SOL ISO/ALC RUB 70PCT 4OZ

## (undated) DEVICE — DRSNG SURESITE WNDW 4X4.5

## (undated) DEVICE — IMMOB KN 3PNL NLY/FIBR UNIV 16IN

## (undated) DEVICE — INTENDED FOR TISSUE SEPARATION, AND OTHER PROCEDURES THAT REQUIRE A SHARP SURGICAL BLADE TO PUNCTURE OR CUT.: Brand: BARD-PARKER ® CARBON RIB-BACK BLADES

## (undated) DEVICE — SUT VIC 2/0 CT2 27IN J269H

## (undated) DEVICE — DRSNG SURESITE123 6X8IN

## (undated) DEVICE — DRILL PIN, JOINT FENESTRATION, 2.0MM: Brand: MEDLINE UNITE

## (undated) DEVICE — SPLNT PLSTR ORTHO 5IN

## (undated) DEVICE — UNDERCAST PADDING: Brand: DEROYAL

## (undated) DEVICE — ENCORE® LATEX ORTHO SIZE 8, STERILE LATEX POWDER-FREE SURGICAL GLOVE: Brand: ENCORE

## (undated) DEVICE — GUIDEPIN, NON-THREADED, 2.5 X 200MM: Brand: MEDLINEUNITE

## (undated) DEVICE — PIN DRL NOHEAD TROC 3.2X75MM BX/4

## (undated) DEVICE — DRSNG GZ CURAD XEROFORM NONADHS 5X9IN STRL

## (undated) DEVICE — SUT ETHLN 3/0 PS1 18IN 1663H

## (undated) DEVICE — APPL CHLORAPREP HI/LITE 26ML ORNG

## (undated) DEVICE — DUAL CUT SAGITTAL BLADE

## (undated) DEVICE — APPL CHLORAPREP W/TINT 26ML ORNG

## (undated) DEVICE — DRP C/ARMOR

## (undated) DEVICE — DRAPE,U/ SHT,SPLIT,PLAS,STERIL: Brand: MEDLINE

## (undated) DEVICE — SKIN PREP TRAY W/CHG: Brand: MEDLINE INDUSTRIES, INC.

## (undated) DEVICE — SYS PERFUS SEP PLATLT W TIPS CUST

## (undated) DEVICE — DRILL BIT, CANNULATED, 3.3MM: Brand: MEDLINE

## (undated) DEVICE — TRAP FLD MINIVAC MEGADYNE 100ML

## (undated) DEVICE — GLV SURG SIGNATURE ESSENTIAL PF LTX SZ8

## (undated) DEVICE — PAD CAST SOF ROL NS 6IN

## (undated) DEVICE — BANDAGE,GAUZE,BULKEE II,4.5"X4.1YD,STRL: Brand: MEDLINE

## (undated) DEVICE — DRSNG WND GZ CURAD OIL EMULSION 3X8IN LF STRL 1PK

## (undated) DEVICE — GLV SURG BIOGEL LTX PF 8

## (undated) DEVICE — STPLR SKIN VISISTAT WD 35CT

## (undated) DEVICE — ADHS SKIN DERMABOND PROPEN

## (undated) DEVICE — DRSNG PAD ABD 8X10IN STRL

## (undated) DEVICE — ARTHROTOME, SMALL JOINT, AO/QC: Brand: MEDLINE UNITE

## (undated) DEVICE — GAUZE,SPONGE,FLUFF,6"X6.75",STRL,10/TRAY: Brand: MEDLINE

## (undated) DEVICE — WEBRIL* CAST PADDING: Brand: DEROYAL

## (undated) DEVICE — PROXIMATE RH ROTATING HEAD SKIN STAPLERS (35 WIDE) CONTAINS 35 STAINLESS STEEL STAPLES: Brand: PROXIMATE

## (undated) DEVICE — SPNG LAP 18X18IN LF STRL PK/5

## (undated) DEVICE — DRILL BIT, CANNULATED, LONG, 4.5MM: Brand: MEDLINE

## (undated) DEVICE — DRP C/ARM 41X74IN

## (undated) DEVICE — GOWN,REINF,POLY,SIRUS,BREATH SLV,XLNG/XL: Brand: MEDLINE

## (undated) DEVICE — SUT VIC 3/0 SH 27IN J416H

## (undated) DEVICE — PK ORTHO MINOR TOWER 40